# Patient Record
Sex: FEMALE | Race: WHITE | Employment: FULL TIME
[De-identification: names, ages, dates, MRNs, and addresses within clinical notes are randomized per-mention and may not be internally consistent; named-entity substitution may affect disease eponyms.]

---

## 2017-02-08 ENCOUNTER — OFFICE VISIT (OUTPATIENT)
Dept: FAMILY MEDICINE CLINIC | Facility: CLINIC | Age: 27
End: 2017-02-08

## 2017-02-08 VITALS
RESPIRATION RATE: 20 BRPM | HEIGHT: 64 IN | SYSTOLIC BLOOD PRESSURE: 110 MMHG | WEIGHT: 171 LBS | TEMPERATURE: 98.2 F | DIASTOLIC BLOOD PRESSURE: 72 MMHG | BODY MASS INDEX: 29.19 KG/M2 | HEART RATE: 88 BPM

## 2017-02-08 DIAGNOSIS — F41.1 GENERALIZED ANXIETY DISORDER: Primary | ICD-10-CM

## 2017-02-08 DIAGNOSIS — F41.0 PANIC ATTACKS: ICD-10-CM

## 2017-02-08 DIAGNOSIS — R07.9 CHEST PAIN, UNSPECIFIED TYPE: ICD-10-CM

## 2017-02-08 DIAGNOSIS — G47.9 SLEEP DISTURBANCE: ICD-10-CM

## 2017-02-08 PROCEDURE — 99214 OFFICE O/P EST MOD 30 MIN: CPT | Performed by: PEDIATRICS

## 2017-02-08 PROCEDURE — 93000 ELECTROCARDIOGRAM COMPLETE: CPT | Performed by: PEDIATRICS

## 2017-02-08 RX ORDER — HYDROXYZINE PAMOATE 25 MG/1
25 CAPSULE ORAL DAILY PRN
Qty: 30 CAPSULE | Refills: 0 | Status: SHIPPED | OUTPATIENT
Start: 2017-02-08 | End: 2020-02-06 | Stop reason: SDUPTHER

## 2017-02-08 RX ORDER — CITALOPRAM 10 MG/1
10 TABLET ORAL DAILY
Qty: 30 TABLET | Refills: 2 | Status: SHIPPED | OUTPATIENT
Start: 2017-02-08 | End: 2017-05-11 | Stop reason: SDUPTHER

## 2017-02-08 ASSESSMENT — ENCOUNTER SYMPTOMS
CHEST TIGHTNESS: 1
WHEEZING: 0
EYE DISCHARGE: 0
RHINORRHEA: 0
BLOOD IN STOOL: 0
EYE REDNESS: 0
DIARRHEA: 0
CONSTIPATION: 0
SHORTNESS OF BREATH: 1
COUGH: 0
SINUS PRESSURE: 0

## 2017-02-08 ASSESSMENT — PATIENT HEALTH QUESTIONNAIRE - PHQ9
2. FEELING DOWN, DEPRESSED OR HOPELESS: 1
SUM OF ALL RESPONSES TO PHQ9 QUESTIONS 1 & 2: 1
SUM OF ALL RESPONSES TO PHQ QUESTIONS 1-9: 1

## 2017-05-11 DIAGNOSIS — F41.0 PANIC ATTACKS: ICD-10-CM

## 2017-05-11 DIAGNOSIS — F41.1 GENERALIZED ANXIETY DISORDER: ICD-10-CM

## 2017-05-11 RX ORDER — CITALOPRAM 10 MG/1
10 TABLET ORAL DAILY
Qty: 30 TABLET | Refills: 2 | Status: SHIPPED | OUTPATIENT
Start: 2017-05-11 | End: 2017-08-10 | Stop reason: SDUPTHER

## 2017-06-21 ENCOUNTER — OFFICE VISIT (OUTPATIENT)
Dept: FAMILY MEDICINE CLINIC | Age: 27
End: 2017-06-21
Payer: COMMERCIAL

## 2017-06-21 VITALS
WEIGHT: 172 LBS | HEART RATE: 84 BPM | DIASTOLIC BLOOD PRESSURE: 84 MMHG | SYSTOLIC BLOOD PRESSURE: 120 MMHG | RESPIRATION RATE: 16 BRPM | TEMPERATURE: 98.2 F | BODY MASS INDEX: 29.52 KG/M2

## 2017-06-21 DIAGNOSIS — F41.0 PANIC ATTACKS: ICD-10-CM

## 2017-06-21 DIAGNOSIS — G47.9 SLEEP DISTURBANCE: ICD-10-CM

## 2017-06-21 DIAGNOSIS — F41.1 GENERALIZED ANXIETY DISORDER: Primary | ICD-10-CM

## 2017-06-21 DIAGNOSIS — R60.9 SWELLING: ICD-10-CM

## 2017-06-21 DIAGNOSIS — K90.41 GLUTEN-SENSITIVE ENTEROPATHY: ICD-10-CM

## 2017-06-21 PROCEDURE — 99214 OFFICE O/P EST MOD 30 MIN: CPT | Performed by: PEDIATRICS

## 2017-06-21 RX ORDER — LORATADINE 10 MG/1
10 TABLET, ORALLY DISINTEGRATING ORAL DAILY
COMMUNITY

## 2017-06-21 ASSESSMENT — ENCOUNTER SYMPTOMS
SINUS PRESSURE: 0
DIARRHEA: 0
COLOR CHANGE: 0
COUGH: 0
EYE REDNESS: 0
CONSTIPATION: 0
CHEST TIGHTNESS: 0
EYE DISCHARGE: 0
ABDOMINAL PAIN: 0
RHINORRHEA: 0
EYE PAIN: 0
STRIDOR: 0
BLOOD IN STOOL: 0
WHEEZING: 0
SHORTNESS OF BREATH: 0

## 2017-08-10 DIAGNOSIS — F41.1 GENERALIZED ANXIETY DISORDER: ICD-10-CM

## 2017-08-10 DIAGNOSIS — F41.0 PANIC ATTACKS: ICD-10-CM

## 2017-08-10 RX ORDER — CITALOPRAM 10 MG/1
10 TABLET ORAL DAILY
Qty: 30 TABLET | Refills: 3 | Status: SHIPPED | OUTPATIENT
Start: 2017-08-10 | End: 2017-12-12 | Stop reason: SDUPTHER

## 2017-12-28 ENCOUNTER — OFFICE VISIT (OUTPATIENT)
Dept: FAMILY MEDICINE CLINIC | Age: 27
End: 2017-12-28
Payer: COMMERCIAL

## 2017-12-28 VITALS
RESPIRATION RATE: 18 BRPM | DIASTOLIC BLOOD PRESSURE: 76 MMHG | SYSTOLIC BLOOD PRESSURE: 110 MMHG | WEIGHT: 189 LBS | HEART RATE: 72 BPM | TEMPERATURE: 98.6 F | BODY MASS INDEX: 32.44 KG/M2

## 2017-12-28 DIAGNOSIS — F41.1 GENERALIZED ANXIETY DISORDER: Primary | ICD-10-CM

## 2017-12-28 DIAGNOSIS — F41.0 PANIC ATTACKS: ICD-10-CM

## 2017-12-28 PROCEDURE — 99214 OFFICE O/P EST MOD 30 MIN: CPT | Performed by: PEDIATRICS

## 2017-12-28 ASSESSMENT — ENCOUNTER SYMPTOMS
WHEEZING: 0
CHEST TIGHTNESS: 0
COLOR CHANGE: 0
COUGH: 0
SINUS PRESSURE: 0
DIARRHEA: 0
CONSTIPATION: 0
STRIDOR: 0
ABDOMINAL PAIN: 0
EYE REDNESS: 0
EYE DISCHARGE: 0
SHORTNESS OF BREATH: 0
BLOOD IN STOOL: 0
EYE PAIN: 0
RHINORRHEA: 0

## 2017-12-28 NOTE — PROGRESS NOTES
episode of anxiety associated with increased stress and this did cause some mild chest pain for several days but this resolved with resolution of the stress. She was previously in therapy but she has been doing so well she has not needed to see her counselor in the last 6 months. She is sleeping well and has not had any new night terrors. She denies any side effects from her medication. She is no longer following a gluten free diet and she has not had any swelling in her hands and legs. She has no other concerns at this time. cmw    Review of Systems   Constitutional: Negative for diaphoresis and fatigue. HENT: Negative for congestion, ear pain, rhinorrhea and sinus pressure. Eyes: Negative for pain, discharge and redness. Respiratory: Negative for cough, chest tightness, shortness of breath, wheezing and stridor. Cardiovascular: Negative for chest pain, palpitations and leg swelling. Gastrointestinal: Negative for abdominal pain, blood in stool, constipation and diarrhea. Endocrine: Negative for polydipsia and polyphagia. Genitourinary: Negative for dysuria, frequency and urgency. Musculoskeletal: Negative for arthralgias. Skin: Negative for color change and rash. Allergic/Immunologic: Negative for immunocompromised state. Neurological: Negative for dizziness, light-headedness, numbness and headaches. Hematological: Does not bruise/bleed easily. Psychiatric/Behavioral: Positive for sleep disturbance (much improved). Negative for agitation, decreased concentration and dysphoric mood. The patient is nervous/anxious (improved with celexa). Objective:   Physical Exam   Constitutional: She is oriented to person, place, and time. She appears well-developed and well-nourished. No distress. HENT:   Head: Normocephalic. Right Ear: External ear normal.   Left Ear: External ear normal.   Nose: Nose normal.   Mouth/Throat: Oropharynx is clear and moist. No oropharyngeal exudate.

## 2018-03-02 ENCOUNTER — HOSPITAL ENCOUNTER (OUTPATIENT)
Age: 28
Setting detail: SPECIMEN
Discharge: HOME | End: 2018-03-02
Payer: COMMERCIAL

## 2018-03-02 ENCOUNTER — OFFICE VISIT (OUTPATIENT)
Dept: FAMILY MEDICINE CLINIC | Age: 28
End: 2018-03-02
Payer: COMMERCIAL

## 2018-03-02 VITALS
WEIGHT: 186 LBS | RESPIRATION RATE: 18 BRPM | SYSTOLIC BLOOD PRESSURE: 112 MMHG | BODY MASS INDEX: 31.93 KG/M2 | HEART RATE: 78 BPM | DIASTOLIC BLOOD PRESSURE: 70 MMHG | TEMPERATURE: 98 F

## 2018-03-02 DIAGNOSIS — J06.9 ACUTE URI: ICD-10-CM

## 2018-03-02 DIAGNOSIS — M25.551 RIGHT HIP PAIN: ICD-10-CM

## 2018-03-02 DIAGNOSIS — Z01.419 ENCOUNTER FOR CERVICAL PAP SMEAR WITH PELVIC EXAM: Primary | ICD-10-CM

## 2018-03-02 DIAGNOSIS — Z12.89 ENCOUNTER FOR PELVIC SCREENING FOR MALIGNANT NEOPLASM: ICD-10-CM

## 2018-03-02 DIAGNOSIS — F41.1 GENERALIZED ANXIETY DISORDER: ICD-10-CM

## 2018-03-02 DIAGNOSIS — F41.0 PANIC ATTACKS: ICD-10-CM

## 2018-03-02 DIAGNOSIS — Z23 NEED FOR TDAP VACCINATION: ICD-10-CM

## 2018-03-02 PROCEDURE — 90471 IMMUNIZATION ADMIN: CPT | Performed by: PEDIATRICS

## 2018-03-02 PROCEDURE — 99395 PREV VISIT EST AGE 18-39: CPT | Performed by: PEDIATRICS

## 2018-03-02 PROCEDURE — 90715 TDAP VACCINE 7 YRS/> IM: CPT | Performed by: PEDIATRICS

## 2018-03-02 ASSESSMENT — ENCOUNTER SYMPTOMS
ABDOMINAL PAIN: 0
CONSTIPATION: 0
NAUSEA: 0
VOMITING: 0
DIARRHEA: 0
BACK PAIN: 0

## 2018-03-02 ASSESSMENT — PATIENT HEALTH QUESTIONNAIRE - PHQ9
1. LITTLE INTEREST OR PLEASURE IN DOING THINGS: 0
SUM OF ALL RESPONSES TO PHQ9 QUESTIONS 1 & 2: 0
2. FEELING DOWN, DEPRESSED OR HOPELESS: 0
SUM OF ALL RESPONSES TO PHQ QUESTIONS 1-9: 0

## 2018-03-02 NOTE — PROGRESS NOTES
is no rash, tenderness or lesion on the left labia. Cervix exhibits no motion tenderness, no discharge and no friability. Right adnexum displays no mass, no tenderness and no fullness. Left adnexum displays no mass, no tenderness and no fullness. Genitourinary Comments: There is a slight white areas along the vagina that could be consistent with yeast vaginitis. Will await pap results (as this will report candida) as patient is asymptomatic. If there is candida will treat with diflucan. Musculoskeletal: Normal range of motion. She exhibits no edema. Right hip: Normal. She exhibits normal range of motion, normal strength, no tenderness, no bony tenderness, no swelling, no crepitus and no deformity. No right hip abnormalities noted   Lymphadenopathy:     She has no cervical adenopathy. Neurological: She is alert. She has normal reflexes. No cranial nerve deficit. Coordination normal.   Skin: Skin is warm and dry. No rash noted. She is not diaphoretic. No erythema. Psychiatric: She has a normal mood and affect. Her behavior is normal.       Assessment:      1. Encounter for cervical Pap smear with pelvic exam  PAP Smear   2. Encounter for pelvic screening for malignant neoplasm  PAP Smear   3. Acute URI     4. Right hip pain  XR HIP RIGHT (2-3 VIEWS)   5. Generalized anxiety disorder     6. Panic attacks     7.  Need for Tdap vaccination  Tdap (age 10y-63y) IM ([facility]ACEL)           Plan:      Proceed with obtain pap for evaluation  Recommend monthly self breast exams  Advise healthy diet / daily exercise   Recommend good sleep hygiene   Reassured that URI symptoms do seem to be improving and should continue to improve over time  Obtain right hip xray / recommend chiropractor treatments, massage therapy and yoga  Continue current medications  Proceed with Tdap today   Continue multi-vitamin / try airborne and kombucha to boost immune system  Call with concerns     Jaky mack counseling on the

## 2018-03-03 ASSESSMENT — ENCOUNTER SYMPTOMS
STRIDOR: 0
EYE DISCHARGE: 0
WHEEZING: 0
SHORTNESS OF BREATH: 0
EYE PAIN: 0
COLOR CHANGE: 0
EYE REDNESS: 0
COUGH: 0
VOICE CHANGE: 1
PHOTOPHOBIA: 0
SORE THROAT: 1

## 2018-03-13 LAB — CYTOLOGY REPORT: NORMAL

## 2018-03-16 ENCOUNTER — HOSPITAL ENCOUNTER (OUTPATIENT)
Facility: CLINIC | Age: 28
Discharge: HOME | End: 2018-03-18
Payer: COMMERCIAL

## 2018-03-16 ENCOUNTER — HOSPITAL ENCOUNTER (OUTPATIENT)
Dept: GENERAL RADIOLOGY | Facility: CLINIC | Age: 28
Discharge: HOME | End: 2018-03-18
Payer: COMMERCIAL

## 2018-03-16 DIAGNOSIS — M25.551 RIGHT HIP PAIN: ICD-10-CM

## 2018-03-16 PROCEDURE — 73502 X-RAY EXAM HIP UNI 2-3 VIEWS: CPT

## 2018-06-18 DIAGNOSIS — F41.0 PANIC ATTACKS: ICD-10-CM

## 2018-06-18 DIAGNOSIS — F41.1 GENERALIZED ANXIETY DISORDER: ICD-10-CM

## 2018-06-18 RX ORDER — CITALOPRAM 10 MG/1
10 TABLET ORAL DAILY
Qty: 30 TABLET | Refills: 5 | Status: SHIPPED | OUTPATIENT
Start: 2018-06-18 | End: 2018-12-26 | Stop reason: SDUPTHER

## 2018-06-25 DIAGNOSIS — Z30.09 BIRTH CONTROL COUNSELING: ICD-10-CM

## 2018-06-25 DIAGNOSIS — Z01.419 WELL FEMALE EXAM WITH ROUTINE GYNECOLOGICAL EXAM: ICD-10-CM

## 2018-06-25 RX ORDER — NORGESTIMATE AND ETHINYL ESTRADIOL 7DAYSX3 28
1 KIT ORAL DAILY
Qty: 28 TABLET | Refills: 10 | Status: SHIPPED | OUTPATIENT
Start: 2018-06-25 | End: 2019-02-27 | Stop reason: SDUPTHER

## 2019-02-27 DIAGNOSIS — Z01.419 WELL FEMALE EXAM WITH ROUTINE GYNECOLOGICAL EXAM: ICD-10-CM

## 2019-02-27 DIAGNOSIS — F41.0 PANIC ATTACKS: ICD-10-CM

## 2019-02-27 DIAGNOSIS — F41.1 GENERALIZED ANXIETY DISORDER: ICD-10-CM

## 2019-02-27 DIAGNOSIS — Z30.09 BIRTH CONTROL COUNSELING: ICD-10-CM

## 2019-02-27 RX ORDER — CITALOPRAM 10 MG/1
10 TABLET ORAL DAILY
Qty: 90 TABLET | Refills: 1 | Status: SHIPPED | OUTPATIENT
Start: 2019-02-27 | End: 2019-08-05 | Stop reason: SDUPTHER

## 2019-02-27 RX ORDER — NORGESTIMATE AND ETHINYL ESTRADIOL 7DAYSX3 28
1 KIT ORAL DAILY
Qty: 84 TABLET | Refills: 3 | Status: SHIPPED | OUTPATIENT
Start: 2019-02-27 | End: 2019-12-18

## 2019-02-27 NOTE — TELEPHONE ENCOUNTER
Patient contacted office stating she is switching to mail order for her prescriptions and [facility]

## 2019-08-05 DIAGNOSIS — F41.0 PANIC ATTACKS: ICD-10-CM

## 2019-08-05 DIAGNOSIS — F41.1 GENERALIZED ANXIETY DISORDER: ICD-10-CM

## 2019-08-05 RX ORDER — CITALOPRAM 10 MG/1
10 TABLET ORAL DAILY
Qty: 90 TABLET | Refills: 1 | Status: SHIPPED | OUTPATIENT
Start: 2019-08-05 | End: 2020-08-16

## 2019-12-18 DIAGNOSIS — Z01.419 WELL FEMALE EXAM WITH ROUTINE GYNECOLOGICAL EXAM: ICD-10-CM

## 2019-12-18 DIAGNOSIS — Z30.09 BIRTH CONTROL COUNSELING: ICD-10-CM

## 2019-12-18 RX ORDER — NORGESTIMATE AND ETHINYL ESTRADIOL 7DAYSX3 28
KIT ORAL
Qty: 84 TABLET | Refills: 4 | Status: SHIPPED | OUTPATIENT
Start: 2019-12-18 | End: 2021-02-10

## 2020-01-21 ENCOUNTER — TELEPHONE (OUTPATIENT)
Dept: FAMILY MEDICINE CLINIC | Age: 30
End: 2020-01-21

## 2020-01-21 NOTE — TELEPHONE ENCOUNTER
I am routing this message to Dr. Aly Toscano as she is patient's PCP and typically sees patient in office.

## 2020-01-21 NOTE — TELEPHONE ENCOUNTER
I do not feel comfortable changing her medication as she has not been seen in almost 2 years. I did give this note to Emil Carlson who will try and find a sooner appointment.

## 2020-01-23 NOTE — TELEPHONE ENCOUNTER
Patient did schedule the appt to ask about the medication but did not intend to have it adjusted before the appt.

## 2020-02-06 ENCOUNTER — OFFICE VISIT (OUTPATIENT)
Dept: FAMILY MEDICINE CLINIC | Age: 30
End: 2020-02-06
Payer: COMMERCIAL

## 2020-02-06 VITALS
SYSTOLIC BLOOD PRESSURE: 110 MMHG | HEART RATE: 70 BPM | BODY MASS INDEX: 29.7 KG/M2 | WEIGHT: 173 LBS | TEMPERATURE: 98 F | DIASTOLIC BLOOD PRESSURE: 70 MMHG

## 2020-02-06 PROBLEM — F41.0 PANIC ATTACKS: Status: ACTIVE | Noted: 2020-02-06

## 2020-02-06 PROBLEM — F32.0 CURRENT MILD EPISODE OF MAJOR DEPRESSIVE DISORDER WITHOUT PRIOR EPISODE (HCC): Status: ACTIVE | Noted: 2020-02-06

## 2020-02-06 PROBLEM — F41.1 GENERALIZED ANXIETY DISORDER: Status: ACTIVE | Noted: 2020-02-06

## 2020-02-06 PROBLEM — F51.4: Status: ACTIVE | Noted: 2020-02-06

## 2020-02-06 PROCEDURE — 99214 OFFICE O/P EST MOD 30 MIN: CPT | Performed by: PEDIATRICS

## 2020-02-06 RX ORDER — CITALOPRAM 20 MG/1
20 TABLET ORAL DAILY
Qty: 90 TABLET | Refills: 1 | Status: SHIPPED | OUTPATIENT
Start: 2020-02-06 | End: 2020-07-17

## 2020-02-06 RX ORDER — HYDROXYZINE PAMOATE 25 MG/1
25 CAPSULE ORAL DAILY PRN
Qty: 30 CAPSULE | Refills: 0 | Status: SHIPPED | OUTPATIENT
Start: 2020-02-06 | End: 2020-03-07

## 2020-02-06 ASSESSMENT — ENCOUNTER SYMPTOMS
WHEEZING: 0
SINUS PRESSURE: 0
ABDOMINAL PAIN: 0
RHINORRHEA: 0
EYE DISCHARGE: 0
EYE PAIN: 0
DIARRHEA: 0
BLOOD IN STOOL: 0
SHORTNESS OF BREATH: 0
COLOR CHANGE: 0
CHEST TIGHTNESS: 0
STRIDOR: 0
EYE REDNESS: 0
CONSTIPATION: 0
COUGH: 0

## 2020-02-06 ASSESSMENT — PATIENT HEALTH QUESTIONNAIRE - PHQ9
2. FEELING DOWN, DEPRESSED OR HOPELESS: 0
SUM OF ALL RESPONSES TO PHQ QUESTIONS 1-9: 0
SUM OF ALL RESPONSES TO PHQ QUESTIONS 1-9: 0
SUM OF ALL RESPONSES TO PHQ9 QUESTIONS 1 & 2: 0
1. LITTLE INTEREST OR PLEASURE IN DOING THINGS: 0

## 2020-02-06 NOTE — PROGRESS NOTES
Subjective:      Patient ID: Devora Muller is a 30 y.o. female. Visit Information    Have you changed or started any medications since your last visit including any over-the-counter medicines, vitamins, or herbal medicines? no   Are you having any side effects from any of your medications? -  no  Have you stopped taking any of your medications? Is so, why? -  no    Have you seen any other physician or provider since your last visit? Yes - Records Obtained  Have you had any other diagnostic tests since your last visit? No  Have you been seen in the emergency room and/or had an admission to a hospital since we last saw you? No  Have you had your routine dental cleaning in the past 6 months? yes -     Have you activated your [employer] account? If not, what are your barriers?  Yes     Patient Care Team:  Rica San MD as PCP - General (Pediatrics)  Rica San MD as PCP - [facility] Provider    Medical History Review  Past Medical, Family, and Social History reviewed and does contribute to the patient presenting condition    Health Maintenance   Topic Date Due    Varicella vaccine (1 of 2 - 2-dose childhood series) 11/23/1989    HIV screen  11/23/2003    Flu vaccine (1) 09/01/2019    Cervical cancer screen  03/02/2021    DTaP/Tdap/Td vaccine (2 - Td) 03/02/2028    Shingles Vaccine (1 of 2) 11/23/2038    Hepatitis A vaccine  Aged Out    Hepatitis B vaccine  Aged Out    Hib vaccine  Aged Out    Meningococcal (ACWY) vaccine  Aged Out    Pneumococcal 0-64 years Vaccine  Aged Out       [facility] Scores 2/6/2020 3/2/2018 2/8/2017   PHQ2 Score 0 0 1   PHQ9 Score 0 0 1     Interpretation of Total Score DepressionSeverity: 1-4 = Minimal depression, 5-9 = Mild depression, 10-14 = Moderate depression, 15-19 = Moderately severe depression, 20-27 = Severe depression    Current Outpatient Medications   Medication Sig Dispense Refill    hydrOXYzine (VISTARIL) 25 MG capsule Take 1 capsule by mouth daily as needed for Anxiety 30 capsule 0    citalopram (CELEXA) 20 MG tablet Take 1 tablet by mouth daily 90 tablet 1    Norgestim-Eth Estrad Triphasic 0.18/0.215/0.25 MG-35 MCG TABS TAKE 1 TABLET DAILY 84 tablet 4    citalopram (CELEXA) 10 MG tablet Take 1 tablet by mouth daily NEEDS APPT 90 tablet 1    loratadine (CLARITIN REDITABS) 10 MG dissolvable tablet Take 10 mg by mouth daily       No current facility-administered medications for this visit. HPI    Patient presents today for worsening symptoms of anxiety and sleep disturbance secondary to night terrors. She does have a history of anxiety and panic attacks with frequent night terrors as well as situational depression in the past.  Approximately 3 years ago she was placed on Celexa 10 mg and has been taking this since then. She states her symptoms have been fairly well-controlled until several months ago. She states that her night terrors have gotten worse over the last several months and she is not sure what has triggered this. She did have a difficult time over the holidays because everyone bugs her about having children and she is happy without children. She also reports that over the last few months she has had difficulty focusing. Even at work she gets somewhat overwhelmed and will need to stop and refocus at times. In January she started having difficulties with being alone however when she was with people she would often feel like she wanted to leave and go back home to be by herself. She does feel as though the social anxiety has worsened over the last several months. She has seen a counselor, Leanna Saenz in the past.  She did see her for approximately a year in 2017. She does feel as though maybe the weather has something to do with her symptoms as well. She is interested in increasing her medication to see if this will be helpful. She will consider counseling.   She does have a prescription for hydroxyzine that she was membranes are moist.      Pharynx: No oropharyngeal exudate or posterior oropharyngeal erythema. Eyes:      General:         Right eye: No discharge. Left eye: No discharge. Extraocular Movements: Extraocular movements intact. Conjunctiva/sclera: Conjunctivae normal.      Pupils: Pupils are equal, round, and reactive to light. Neck:      Musculoskeletal: Normal range of motion and neck supple. Thyroid: No thyromegaly. Vascular: No JVD. Cardiovascular:      Rate and Rhythm: Normal rate and regular rhythm. Heart sounds: Normal heart sounds. No murmur. Pulmonary:      Effort: Pulmonary effort is normal.      Breath sounds: Normal breath sounds. No wheezing or rales. Abdominal:      Tenderness: There is no right CVA tenderness or left CVA tenderness. Musculoskeletal:      Right lower leg: No edema. Left lower leg: No edema. Skin:     General: Skin is warm. Findings: No rash. Neurological:      General: No focal deficit present. Mental Status: She is alert and oriented to person, place, and time. Psychiatric:         Attention and Perception: Attention normal.         Mood and Affect: Mood is anxious and depressed. Speech: Speech normal.         Behavior: Behavior normal.         Thought Content: Thought content normal.         Judgment: Judgment normal.         Assessment:      Diagnosis Orders   1. Generalized anxiety disorder     2. Panic attacks  hydrOXYzine (VISTARIL) 25 MG capsule   3. Current mild episode of major depressive disorder without prior episode ([address]     4. Sleep disturbance     5.  Adult night terror         Plan:     Proceed with increase Celexa to 20 mg once daily  Refill hydroxyzine to use as needed for severe anxiety  Recommend counseling  Regular exercise and healthy diet encouraged  Good sleep hygiene recommended  Call with concerns or worsening symptoms      Noa received counseling on the following healthy behaviors:

## 2020-07-17 RX ORDER — CITALOPRAM 20 MG/1
20 TABLET ORAL DAILY
Qty: 90 TABLET | Refills: 3 | Status: SHIPPED | OUTPATIENT
Start: 2020-07-17 | End: 2021-07-13

## 2020-07-17 NOTE — TELEPHONE ENCOUNTER
Lov: 2/6/20  Lrf: 2/6/20  Na: 8/6/20  Health Maintenance   Topic Date Due    Varicella vaccine (1 of 2 - 2-dose childhood series) 11/23/1989    HIV screen  11/23/2003    Flu vaccine (1) 09/01/2020    Cervical cancer screen  03/02/2021    DTaP/Tdap/Td vaccine (2 - Td) 03/02/2028    Hepatitis A vaccine  Aged Out    Hepatitis B vaccine  Aged Out    Hib vaccine  Aged Out    Meningococcal (ACWY) vaccine  Aged Out    Pneumococcal 0-64 years Vaccine  Aged Out             (applicable per patient's age: Cancer Screenings, Depression Screening, Fall Risk Screening, Immunizations)    LDL Cholesterol (mg/dL)   Date Value   04/14/2015 46     AST (U/L)   Date Value   04/14/2015 20     ALT (U/L)   Date Value   04/14/2015 14     BUN (mg/dL)   Date Value   04/14/2015 16      (goal A1C is < 7)   (goal LDL is <100) need 30-50% reduction from baseline     BP Readings from Last 3 Encounters:   02/06/20 110/70   03/02/18 112/70   12/28/17 110/76    (goal /80)      All Future Testing planned in [facility]:      Next Visit Date:  Future Appointments   Date Time Provider Trey Pratt   8/6/2020  1:40 PM MD [facility]            Patient Active Problem List:     Generalized anxiety disorder     Adult night terror     Panic attacks     Current mild episode of major depressive disorder without prior episode ([address]

## 2020-08-06 ENCOUNTER — HOSPITAL ENCOUNTER (OUTPATIENT)
Age: 30
Setting detail: SPECIMEN
Discharge: HOME | End: 2020-08-06
Payer: COMMERCIAL

## 2020-08-06 ENCOUNTER — OFFICE VISIT (OUTPATIENT)
Dept: FAMILY MEDICINE CLINIC | Age: 30
End: 2020-08-06
Payer: COMMERCIAL

## 2020-08-06 VITALS
HEART RATE: 77 BPM | BODY MASS INDEX: 32.74 KG/M2 | HEIGHT: 64 IN | OXYGEN SATURATION: 97 % | WEIGHT: 191.8 LBS | TEMPERATURE: 97 F | SYSTOLIC BLOOD PRESSURE: 112 MMHG | RESPIRATION RATE: 16 BRPM | DIASTOLIC BLOOD PRESSURE: 72 MMHG

## 2020-08-06 PROCEDURE — 99395 PREV VISIT EST AGE 18-39: CPT | Performed by: PEDIATRICS

## 2020-08-06 RX ORDER — AMOXICILLIN AND CLAVULANATE POTASSIUM 875; 125 MG/1; MG/1
1 TABLET, FILM COATED ORAL 2 TIMES DAILY
COMMUNITY
Start: 2020-08-04 | End: 2020-12-29 | Stop reason: ALTCHOICE

## 2020-08-06 RX ORDER — CLINDAMYCIN PHOSPHATE 10 UG/ML
LOTION TOPICAL PRN
COMMUNITY
Start: 2020-07-28 | End: 2020-12-29 | Stop reason: ALTCHOICE

## 2020-08-06 ASSESSMENT — ENCOUNTER SYMPTOMS
ABDOMINAL PAIN: 0
EYE REDNESS: 0
NAUSEA: 0
SHORTNESS OF BREATH: 0
COUGH: 0
WHEEZING: 0
VOMITING: 0
EYE DISCHARGE: 0
COLOR CHANGE: 0
CONSTIPATION: 0
DIARRHEA: 0
STRIDOR: 0
PHOTOPHOBIA: 0
EYE PAIN: 0

## 2020-08-06 NOTE — PROGRESS NOTES
Subjective:      Patient ID: Allison Zeng is a 30 y.o. female. Visit Information    Have you changed or started any medications since your last visit including any over-the-counter medicines, vitamins, or herbal medicines? yes - Updated   Are you having any side effects from any of your medications? -  no  Have you stopped taking any of your medications? Is so, why? -  no    Have you seen any other physician or provider since your last visit? Yes - Records Requested Arlis Mention- Chiro 2x monthly- Dehydrated disc  Have you had any other diagnostic tests since your last visit? Yes - Records Requested XR Chiropractor   Have you been seen in the emergency room and/or had an admission to a hospital since we last saw you? No  Have you had your routine dental cleaning in the past 6 months? yes -      Have you activated your [employer] account? If not, what are your barriers?  Yes     Patient Care Team:  Mau Polanco MD as PCP - General (Pediatrics)  Mau Polanco MD as PCP - [facility] Provider    Medical History Review  Past Medical, Family, and Social History reviewed and does contribute to the patient presenting condition    Health Maintenance   Topic Date Due    Varicella vaccine (1 of 2 - 2-dose childhood series) 11/23/1989    HIV screen  11/23/2003    Flu vaccine (1) 09/01/2020    Cervical cancer screen  08/06/2025    DTaP/Tdap/Td vaccine (2 - Td) 03/02/2028    Hepatitis A vaccine  Aged Out    Hepatitis B vaccine  Aged Out    Hib vaccine  Aged Out    Meningococcal (ACWY) vaccine  Aged Out    Pneumococcal 0-64 years Vaccine  Aged Out       [facility] Scores 2/6/2020 3/2/2018 2/8/2017   PHQ2 Score 0 0 1   PHQ9 Score 0 0 1     Interpretation of Total Score DepressionSeverity: 1-4 = Minimal depression, 5-9 = Mild depression, 10-14 = Moderate depression, 15-19 = Moderately severe depression, 20-27 = Severe depression    Current Outpatient Medications   Medication Sig Dispense Refill  amoxicillin-clavulanate (AUGMENTIN) 875-125 MG per tablet Take 1 tablet by mouth 2 times daily For 10 days.  clindamycin (CLEOCIN T) 1 % lotion as needed      citalopram (CELEXA) 20 MG tablet Take 1 tablet by mouth daily 90 tablet 3    Norgestim-Eth Estrad Triphasic 0.18/0.215/0.25 MG-35 MCG TABS TAKE 1 TABLET DAILY 84 tablet 4    loratadine (CLARITIN REDITABS) 10 MG dissolvable tablet Take 10 mg by mouth daily      citalopram (CELEXA) 10 MG tablet Take 1 tablet by mouth daily NEEDS APPT (Patient not taking: Reported on 8/6/2020) 90 tablet 1     No current facility-administered medications for this visit. HPI    Patient presents today for routine Pap and pelvic/physical exam.  She does report that her menstrual cycles are regular with some occasional spotting. She denies any vaginal discharge, vaginal pain or pelvic pain. She is sexually active with one partner and denies any other gynecologic concerns. She does perform occasional self breast exam without concerns. She does have a history of seasonal allergies which are well controlled with an as needed antihistamine. She does also have a history of generalized anxiety disorder, panic disorder, major depression and sleep disturbance that is well controlled with Celexa. She does have a prescription for hydroxyzine that she has used in the past for severe episodes of panic. She states that she has developed some acne across the forehead and cheeks. She did see a dermatologist and was prescribed clindamycin in the morning and retinol gel in the evening. She did also have a root canal and crown 2 days ago with [facility]. Her biggest complaint is that she has been having a lot of low back pain on the right side that feels like a pinching sensation. She does have some numbness and tingling down both legs. She denies any bowel or bladder symptoms.   She did have x-rays 2 weeks ago with Dr. Misael Marsh her chiropractor at [facility]. She states that she has been seeing him for approximately 5 years intermittently. He has been working on her low back since March and did see a dehydrated disc on her x-ray. She has been using an inversion table and doing yoga 3 times weekly. She does report that she would like to see a spine surgeon, Dr. Anna Pantoja. She did have a fall last year in the fall / early winter and wonders if she did something then. She has no other concerns at this time. cmw    Review of Systems   Constitutional: Negative for appetite change, chills, fatigue and fever. HENT: Negative for congestion, ear discharge, ear pain, hearing loss, postnasal drip, rhinorrhea, sinus pressure, sinus pain, sore throat and voice change. Allergies controlled with as needed antihistamine   Eyes: Negative for photophobia, pain, discharge, redness and visual disturbance. Respiratory: Negative for cough, shortness of breath, wheezing and stridor. Cardiovascular: Negative for chest pain, palpitations and leg swelling. Gastrointestinal: Negative for abdominal pain, blood in stool, constipation, diarrhea, nausea and vomiting. Endocrine: Negative for polydipsia, polyphagia and polyuria. Genitourinary: Negative for decreased urine volume, dysuria, flank pain, frequency, hematuria, pelvic pain, urgency, vaginal bleeding, vaginal discharge and vaginal pain. Musculoskeletal: Positive for back pain. Negative for arthralgias, gait problem and joint swelling. Skin: Negative for color change and rash. acne   Allergic/Immunologic: Negative for immunocompromised state. Neurological: Positive for numbness. Negative for dizziness, weakness, light-headedness and headaches. Hematological: Negative for adenopathy. Does not bruise/bleed easily. Psychiatric/Behavioral: Positive for dysphoric mood (controlled). Negative for decreased concentration and sleep disturbance.  The patient is nervous/anxious (controlled with celexa). Objective:     /72 (Site: Left Upper Arm, Position: Sitting, Cuff Size: Large Adult)   Pulse 77   Temp 97 °F (36.1 °C) (Temporal)   Resp 16   Ht 5' 4\" (1.626 m)   Wt 191 lb 12.8 oz (87 kg)   LMP 07/26/2020 (Exact Date)   SpO2 97%   Breastfeeding No   BMI 32.92 kg/m²        Physical Exam  Vitals signs and nursing note reviewed. Constitutional:       General: She is not in acute distress. Appearance: Normal appearance. She is well-developed. She is obese. She is not ill-appearing, toxic-appearing or diaphoretic. HENT:      Head: Normocephalic and atraumatic. Right Ear: Tympanic membrane, ear canal and external ear normal. No middle ear effusion. There is no impacted cerumen. Left Ear: Tympanic membrane, ear canal and external ear normal.  No middle ear effusion. There is no impacted cerumen. Nose: Mucosal edema present. No congestion or rhinorrhea. Right Sinus: No maxillary sinus tenderness or frontal sinus tenderness. Left Sinus: No maxillary sinus tenderness or frontal sinus tenderness. Mouth/Throat:      Mouth: Mucous membranes are moist.      Pharynx: Uvula midline. No oropharyngeal exudate or posterior oropharyngeal erythema. Eyes:      General: No scleral icterus. Right eye: No discharge. Left eye: No discharge. Conjunctiva/sclera: Conjunctivae normal.      Pupils: Pupils are equal, round, and reactive to light. Neck:      Musculoskeletal: Normal range of motion and neck supple. No neck rigidity. Thyroid: No thyromegaly. Vascular: No carotid bruit or JVD. Cardiovascular:      Rate and Rhythm: Normal rate and regular rhythm. Pulses: Normal pulses. Heart sounds: Normal heart sounds. No murmur. Pulmonary:      Effort: Pulmonary effort is normal. No respiratory distress. Breath sounds: Normal breath sounds. No wheezing or rales.    Abdominal:      General: Bowel sounds are normal.      Palpations: Abdomen is soft. There is no mass. Tenderness: There is no abdominal tenderness. There is no right CVA tenderness, left CVA tenderness or guarding. Genitourinary:     General: Normal vulva. Exam position: Supine. Labia:         Right: No rash, tenderness or lesion. Left: No rash, tenderness or lesion. Vagina: Normal.      Cervix: No cervical motion tenderness, discharge or friability. Adnexa:         Right: No mass, tenderness or fullness. Left: No mass, tenderness or fullness. Musculoskeletal: Normal range of motion. Right lower leg: No edema. Left lower leg: No edema. Lymphadenopathy:      Cervical: No cervical adenopathy. Skin:     General: Skin is warm and dry. Capillary Refill: Capillary refill takes less than 2 seconds. Findings: No erythema or rash. Neurological:      General: No focal deficit present. Mental Status: She is alert and oriented to person, place, and time. Cranial Nerves: No cranial nerve deficit. Motor: No weakness. Coordination: Coordination normal.      Gait: Gait normal.      Deep Tendon Reflexes: Reflexes are normal and symmetric. Psychiatric:         Mood and Affect: Mood normal.         Behavior: Behavior normal.         Thought Content: Thought content normal.         Judgment: Judgment normal.         Assessment:      Diagnosis Orders   1. Well female exam with routine gynecological exam  PAP Smear   2. Encounter for screening for cervical cancer      3. Encounter for screening breast examination and discussion of breast self examination     4. Annual physical exam  Lipid Panel    Comprehensive Metabolic Panel    CBC   5. Seasonal allergies     6. Generalized anxiety disorder     7. Panic attacks     8. Current mild episode of major depressive disorder without prior episode ([address]     9. Sleep disturbance     10. Acne, unspecified acne type     11.  Chronic right-sided low back pain with bilateral sciatica         Plan:     Proceed with obtain Pap  Recommend monthly self breast exams  Obtain labs as ordered  Healthy diet and regular exercise encouraged  Continue current medications  Continue antihistamine as needed for allergy symptoms  Continue Celexa  Counseling if symptoms worsen  Obtain dermatology note  Recommend follow-up with chiropractor and neurosurgeon as planned  Call with concerns      Balaji Parker received counseling on the following healthy behaviors: nutrition, exercise and medication adherence  Reviewed prior labs and health maintenance  Continue current medications, diet and exercise. Discussed use, benefit, and side effects of prescribed medications. Barriers to medication compliance addressed. Patient given educational materials - see patient instructions  Was a self-tracking handout given in paper form or via [employer]? No    Requested Prescriptions      No prescriptions requested or ordered in this encounter       All patient questions answered. Patient voiced understanding. Quality Measures    Body mass index is 32.92 kg/m². Elevated. Weight control planned discussed Healthy diet and regular exercise. BP: 112/72 Blood pressure is normal. Treatment plan consists of No treatment change needed.     Lab Results   Component Value Date    LDLCHOLESTEROL 46 04/14/2015    (goal LDL reduction with dx if diabetes is 50% LDL reduction)      PHQ Scores 2/6/2020 3/2/2018 2/8/2017   PHQ2 Score 0 0 1   PHQ9 Score 0 0 1     Interpretation of Total Score Depression Severity: 1-4 = Minimal depression, 5-9 = Mild depression, 10-14 = Moderate depression, 15-19 = Moderately severe depression, 20-27 = Severe depression          Electronically signed by Magdalene Tolentino MD on 8/16/2020 at 2:30 PM

## 2020-08-10 LAB
HPV SAMPLE: NORMAL
HPV, GENOTYPE 16: NOT DETECTED
HPV, GENOTYPE 18: NOT DETECTED
HPV, HIGH RISK OTHER: NOT DETECTED
HPV, INTERPRETATION: NORMAL
SPECIMEN DESCRIPTION: NORMAL

## 2020-08-11 LAB — CYTOLOGY REPORT: NORMAL

## 2020-08-16 ASSESSMENT — ENCOUNTER SYMPTOMS
RHINORRHEA: 0
SINUS PRESSURE: 0
BACK PAIN: 1
ROS SKIN COMMENTS: ACNE
VOICE CHANGE: 0
SINUS PAIN: 0
SORE THROAT: 0
BLOOD IN STOOL: 0

## 2020-08-25 ENCOUNTER — TELEPHONE (OUTPATIENT)
Dept: FAMILY MEDICINE CLINIC | Age: 30
End: 2020-08-25

## 2020-08-25 NOTE — TELEPHONE ENCOUNTER
Spoke with Amy Dacosta and she is requesting a referral from Dr. Christian Kwan, the the referral is to Neuorlogist Dr. Kamilla Boudreaux

## 2020-08-26 NOTE — TELEPHONE ENCOUNTER
Patient will complete x-rays at a [facility] facility, she was given the number to scheduling.

## 2020-08-27 ENCOUNTER — HOSPITAL ENCOUNTER (OUTPATIENT)
Dept: GENERAL RADIOLOGY | Age: 30
Discharge: HOME | End: 2020-08-29
Payer: COMMERCIAL

## 2020-08-27 ENCOUNTER — HOSPITAL ENCOUNTER (OUTPATIENT)
Age: 30
Discharge: HOME | End: 2020-08-27
Payer: COMMERCIAL

## 2020-08-27 ENCOUNTER — HOSPITAL ENCOUNTER (OUTPATIENT)
Age: 30
Discharge: HOME | End: 2020-08-29
Payer: COMMERCIAL

## 2020-08-27 LAB
ALBUMIN SERPL-MCNC: 3.9 G/DL (ref 3.5–5.2)
ALBUMIN/GLOBULIN RATIO: 1.3 (ref 1–2.5)
ALP BLD-CCNC: 47 U/L (ref 35–104)
ALT SERPL-CCNC: 19 U/L (ref 5–33)
ANION GAP SERPL CALCULATED.3IONS-SCNC: 11 MMOL/L (ref 9–17)
AST SERPL-CCNC: 23 U/L
BILIRUB SERPL-MCNC: 0.42 MG/DL (ref 0.3–1.2)
BUN BLDV-MCNC: 13 MG/DL (ref 6–20)
BUN/CREAT BLD: NORMAL (ref 9–20)
CALCIUM SERPL-MCNC: 8.9 MG/DL (ref 8.6–10.4)
CHLORIDE BLD-SCNC: 102 MMOL/L (ref 98–107)
CHOLESTEROL/HDL RATIO: 2
CHOLESTEROL: 161 MG/DL
CO2: 27 MMOL/L (ref 20–31)
CREAT SERPL-MCNC: 0.75 MG/DL (ref 0.5–0.9)
GFR AFRICAN AMERICAN: >60 ML/MIN
GFR NON-AFRICAN AMERICAN: >60 ML/MIN
GFR SERPL CREATININE-BSD FRML MDRD: NORMAL ML/MIN/{1.73_M2}
GFR SERPL CREATININE-BSD FRML MDRD: NORMAL ML/MIN/{1.73_M2}
GLUCOSE BLD-MCNC: 85 MG/DL (ref 70–99)
HCT VFR BLD CALC: 43.2 % (ref 36.3–47.1)
HDLC SERPL-MCNC: 79 MG/DL
HEMOGLOBIN: 13.4 G/DL (ref 11.9–15.1)
LDL CHOLESTEROL: 66 MG/DL (ref 0–130)
MCH RBC QN AUTO: 30.5 PG (ref 25.2–33.5)
MCHC RBC AUTO-ENTMCNC: 31 G/DL (ref 28.4–34.8)
MCV RBC AUTO: 98.2 FL (ref 82.6–102.9)
NRBC AUTOMATED: 0 PER 100 WBC
PDW BLD-RTO: 12.1 % (ref 11.8–14.4)
PLATELET # BLD: 323 K/UL (ref 138–453)
PMV BLD AUTO: 10.6 FL (ref 8.1–13.5)
POTASSIUM SERPL-SCNC: 4.6 MMOL/L (ref 3.7–5.3)
RBC # BLD: 4.4 M/UL (ref 3.95–5.11)
SODIUM BLD-SCNC: 140 MMOL/L (ref 135–144)
TOTAL PROTEIN: 6.9 G/DL (ref 6.4–8.3)
TRIGL SERPL-MCNC: 78 MG/DL
VLDLC SERPL CALC-MCNC: NORMAL MG/DL (ref 1–30)
WBC # BLD: 5.7 K/UL (ref 3.5–11.3)

## 2020-08-27 PROCEDURE — 80053 COMPREHEN METABOLIC PANEL: CPT

## 2020-08-27 PROCEDURE — 36415 COLL VENOUS BLD VENIPUNCTURE: CPT

## 2020-08-27 PROCEDURE — 85027 COMPLETE CBC AUTOMATED: CPT

## 2020-08-27 PROCEDURE — 80061 LIPID PANEL: CPT

## 2020-08-27 PROCEDURE — 72110 X-RAY EXAM L-2 SPINE 4/>VWS: CPT

## 2020-09-17 ENCOUNTER — HOSPITAL ENCOUNTER (OUTPATIENT)
Dept: PHYSICAL THERAPY | Facility: CLINIC | Age: 30
Setting detail: THERAPIES SERIES
Discharge: HOME | End: 2020-09-17
Payer: COMMERCIAL

## 2020-09-17 PROCEDURE — 97110 THERAPEUTIC EXERCISES: CPT

## 2020-09-17 PROCEDURE — 97161 PT EVAL LOW COMPLEX 20 MIN: CPT

## 2020-09-17 NOTE — CONSULTS
[] [address]  [address]  [phone]  F: [phone] [] [address]  [address] ([facility])   [address]  P: [phone]  F: [phone] [x] [place]  [address] [facility]  P: [phone]  F: [phone] [] [address]  [facility]   [address]   [place]: [phone]  F: [phone]      Physical Therapy Spine Evaluation    Date:  2020  Patient: Ange Horn  : 1990  MRN: [id number]  Physician: Ariel Craig MD  Insurance: [employer] 19LO  Medical Diagnosis:  M54.5 (ICD-10-CM) - Low back pain, non-specific   Rehab Codes: M54.5  Onset Date: 2020  Next 's appt. : tbd     Subjective:   CC:Patient reports continuous LBP described as aching with intermittent sharp pain down R LE to mid posterior thigh. Patient states that she feels a R side catching with bending. Pain increased with bending , lifting R LE too high, PF/DF needed with driving, prolong sitting and driving greater than an hr, prolong walking. No B&B changes, No N&T  Pain at best rated as 1-2/10. Pain at worst 10/10    HPI: (onset date) ILEANA unknown Patinet sees chiropractor 2x/mo. Patient reports receiving acupuncture 1-2x/wk      Symptoms:  [x] Improving reports much improved in last 2 wks [x] Worsening since initial occurrence in [place]  Job duties: sitting job    PMHx: [x] Unremarkable [] Diabetes [] HTN  [] Pacemaker   [] MI/Heart Problems [] Cancer [] Arthritis [x] Other:panic attacks, anxiety and depression              [x] Refer to full medical chart  In EPIC     Tests: [] X-Ray: 20  Lateral view neutral position demonstrates no significant malalignment. Alignment does not significantly change with flexion or extension.  Disc    spaces demonstrate minimal degenerative changes. Vasopneumatic cold with compression  [id number]    [] Gait Training   [id number] [] Ultrasound   [id number]   [x] Neuromuscular Re-education  [id number] [] Electrical Stimulation Unattended  33853   [x] Manual Therapy  24701 [] Electrical Stimulation Attended  [id number]   [x] Instruction in HEP  [] Lumbar/Cervical Traction  [id number]   [x] Aquatic Therapy   52073 [] Cold/hotpack    [] Massage   64911      [] Dry Needling, 1 or 2 muscles  50954   [] Biofeedback, first 15 minutes   45018  [] Biofeedback, additional 15 minutes   34584 [] Dry Needling, 3 or more muscles  54083     []  Medication allergies reviewed for use of    Dexamethasone Sodium Phosphate 4mg/ml     with iontophoresis treatments. Pt is not allergic.     Frequency:  2 x/week for 12 visits    Todays Treatment:  Manual: MET Pubic clear, MET R anterior pelvic rotation  Precautions:  Exercises:  Exercise Reps/ Time Weight/ Level Comments   Piriformis stretch 90sec     ADD/ABD bracing 10x10\"     Bridge bracing 1010\"     IR/ER Bracing 10x10\"           Other:    Specific Instructions for next treatment: Begin aquatic    Evaluation Complexity:  History (Personal factors, comorbidities) [x] 0 [] 1-2 [] 3+   Exam (limitations, restrictions) [x] 1-2 [] 3 [] 4+   Clinical presentation (progression) [x] Stable [] Evolving  [] Unstable   Decision Making [x] Low [] Moderate [] High    [x] Low Complexity [] Moderate Complexity [] High Complexity       Treatment Charges: Mins Units   [x] Evaluation       [x]  Low       []  Moderate       []  High 35 1   []  Modalities     [x]  Ther Exercise 10 1   [x]  Manual Therapy 5    []  Ther Activities     []  Aquatics     []  Vasocompression     []  Other       TOTAL TREATMENT TIME: 50    Time in: 1130      Time out: 1230    Electronically signed by: Rossy Grider PT        Physician Signature:________________________________Date:__________________  By signing above or cosigning this note, I have reviewed this plan of care and certify a need for medically necessary rehabilitation services.      *PLEASE SIGN ABOVE AND FAX BACK ALL PAGES*

## 2020-09-21 ENCOUNTER — HOSPITAL ENCOUNTER (OUTPATIENT)
Dept: PHYSICAL THERAPY | Facility: CLINIC | Age: 30
Setting detail: THERAPIES SERIES
Discharge: HOME | End: 2020-09-21
Payer: COMMERCIAL

## 2020-09-21 PROCEDURE — 97113 AQUATIC THERAPY/EXERCISES: CPT

## 2020-09-21 NOTE — FLOWSHEET NOTE
Ankle ROM  Diagonals 1/2    Lunges   Elbow flex/ext      Pron/Sup    Functional Exercise  Wrist AROM    Step      Wall Push-ups  Deep H20/    SLS  Bike 3m   Breast Stroke on Noodle  Hip abd/add    Noodle Twist  Hip flex/ext    Noodle Push down  Hip IR/ER 20x   Kickboard push/pull  Knee flex/ext      Push/pull on [employer] 5m   Other:    Specific Instructions for next treatment:dynamic ex    Treatment Charges: Mins Units   []  Modalities     []  Ther Exercise     []  Manual Therapy     []  Ther Activities     [x]  Aquatics 30 2   []  Other       Assessment: [x] Progressing toward goals. Initiated light aquatic ex per flow sheet with fair cherelle and felt a pull L LE during 3 way hip ex. Pt feels tighter on that side. Sore across LB after static ex, VC for TA contr. To deep water for pelvis rotator ex and for lumbar decompression. Pt feels good leaving tx with less pain, but cont to notice soreness LB. Will cont to progress ex as cherelle. [] No change. [] Other:  STG: (to be met in 6 treatments)  1. ? Pain: Patient to report 2/10 pain at worst  2. ? ROM:  3. ? Strength: Patient to demonstrate the ability to maintain neutral pelvic alignment  4. ? Function: Patient to demonstrate the ability to FB   5. Patient to be independent with home exercise program as demonstrated by performance with correct form without cues. 6. Demonstrate Knowledge of fall prevention  LTG: (to be met in 12 treatments)  1. Patient to report 0/10 pain with functional activities  2. Patient to report 70/80 on LEFI  3. Patient to demonstrate the ability to transfers without hesitation        Patient goals: To eliminate pain and increase function       Pt. Education:  [x] Yes  [] No  [] Reviewed Prior HEP/Ed  Method of Education: [x] Verbal  [] Demo  [] Written  Comprehension of Education:  [x] Verbalizes understanding. [] Demonstrates understanding. [] Needs review. [] Demonstrates/verbalizes HEP/Ed previously given.      Plan: [x] Continue per plan of care.    [] Other:      Time In:4:25pm            Time Out: 5:00 pm    Electronically signed by:  Ronald Vega PTA

## 2020-09-23 ENCOUNTER — HOSPITAL ENCOUNTER (OUTPATIENT)
Dept: PHYSICAL THERAPY | Facility: CLINIC | Age: 30
Setting detail: THERAPIES SERIES
Discharge: HOME | End: 2020-09-23
Payer: COMMERCIAL

## 2020-09-23 PROCEDURE — G0283 ELEC STIM OTHER THAN WOUND: HCPCS

## 2020-09-23 PROCEDURE — 97140 MANUAL THERAPY 1/> REGIONS: CPT

## 2020-09-23 NOTE — FLOWSHEET NOTE
neutral pelvic alignment  4. ? Function: Patient to demonstrate the ability to FB   5. Patient to be independent with home exercise program as demonstrated by performance with correct form without cues. 6. Demonstrate Knowledge of fall prevention  LTG: (to be met in 12 treatments)  1. Patient to report 0/10 pain with functional activities  2. Patient to report 70/80 on LEFI  3. Patient to demonstrate the ability to transfers without hesitation        Patient goals: To eliminate pain and increase function       Pt. Education:  [x] Yes  [] No  [x] Reviewed Prior HEP/Ed  Method of Education: [x] Verbal  [] Demo  [] Written  Comprehension of Education:  [] Verbalizes understanding. [] Demonstrates understanding. [] Needs review. [] Demonstrates/verbalizes HEP/Ed previously given. Plan: [x] Continue current frequency toward long and short term goals.           Time In:1100           Time Out: 1200    Electronically signed by:  Orion Mancera PT

## 2020-09-28 ENCOUNTER — HOSPITAL ENCOUNTER (OUTPATIENT)
Dept: PHYSICAL THERAPY | Facility: CLINIC | Age: 30
Setting detail: THERAPIES SERIES
Discharge: HOME | End: 2020-09-28
Payer: COMMERCIAL

## 2020-09-28 PROCEDURE — 97113 AQUATIC THERAPY/EXERCISES: CPT

## 2020-09-28 NOTE — FLOWSHEET NOTE
Other:       Time In:4:30pm            Time Out: 5:10 pm    Electronically signed by:  Gerald Jimenez PTA

## 2020-10-01 ENCOUNTER — HOSPITAL ENCOUNTER (OUTPATIENT)
Dept: PHYSICAL THERAPY | Facility: CLINIC | Age: 30
Setting detail: THERAPIES SERIES
Discharge: HOME | End: 2020-10-01
Payer: COMMERCIAL

## 2020-10-01 PROCEDURE — 97110 THERAPEUTIC EXERCISES: CPT

## 2020-10-01 NOTE — FLOWSHEET NOTE
[] [address]  [address]  [phone]  F: [phone] [] [address]  [facility]   [address]  P: [phone]  F: [phone] [x] [place]  [address] [facility]  P: [phone]  F: [phone] [] [address]  [facility]   [address]   [place]: [phone]  F: [phone]      Physical Therapy Daily Treatment Note    Date:  10/1/2020  Patient Name:  Liv Cedeño    :  1990  MRN: [id number]   Physician: Rere West MD  Insurance: [employer] 14JB  Medical Diagnosis:   M54.5 (ICD-10-CM) - Low back pain, non-specific   Rehab Codes: M54.5  Onset Date: 2020             Next 's appt. : tbd          Visit# / total visits:                     Cancels/No Shows:     Subjective:    Pain:  [x] Yes  [] No Location: R SIJ 5/10   Pain altered Tx:  [] No  [] Yes  Action:  Comments: Patient reports seeing chiropractor on Monday and having an adjustment. She is attending every 2 wks    Objective:  Modalities:   Precautions:  Exercises:  Exercise Reps/ Time Weight/ Level Comments   Piriformis stretch x90     Roll up x10     Hip release x20     Dead bug x20     Clam shells 30 degrees x20     Bridge w/ ball fatigue           TVA edu      Other:  Manual:STR: bilateral piriformis, gentle LAD L LE prone      Treatment Charges: Mins Units   []  Modalities     [x]  Ther Exercise 40 3   [x]  Manual Therapy 5 nc   []  Ther Activities     []  Aquatics     []  Vasocompression     []  Other     Total Treatment time 45 3       Assessment: [] Progressing toward goals. [] No change. Patient edu in importance of TVA activation to assist in lumbo pelvic stabilization. Tactile cues used to improve proprioceptive awareness of pelvic position and increased activation.  Patient demonstrated and verbalized good understanding. Patient given HO to progress HEP     [x] Other:Patient able to obtain pelvic alignment post treatment. Ended with estim due to demonstration of soreness with tansfers. STG: (to be met in 6 treatments)  1. ? Pain: Patient to report 2/10 pain at worst  2. ? ROM:  3. ? Strength: Patient to demonstrate the ability to maintain neutral pelvic alignment  4. ? Function: Patient to demonstrate the ability to FB   5. Patient to be independent with home exercise program as demonstrated by performance with correct form without cues. 6. Demonstrate Knowledge of fall prevention  LTG: (to be met in 12 treatments)  1. Patient to report 0/10 pain with functional activities  2. Patient to report 70/80 on LEFI  3. Patient to demonstrate the ability to transfers without hesitation        Patient goals: To eliminate pain and increase function       Pt. Education:  [x] Yes  [] No  [x] Reviewed Prior HEP/Ed  Method of Education: [x] Verbal  [] Demo  [] Written  Comprehension of Education:  [] Verbalizes understanding. [] Demonstrates understanding. [] Needs review. [] Demonstrates/verbalizes HEP/Ed previously given. Plan: [x] Continue current frequency toward long and short term goals.           Time In:1100           Time Out: 1200    Electronically signed by:  Itzel Hopper PT

## 2020-10-05 ENCOUNTER — HOSPITAL ENCOUNTER (OUTPATIENT)
Dept: PHYSICAL THERAPY | Facility: CLINIC | Age: 30
Setting detail: THERAPIES SERIES
Discharge: HOME | End: 2020-10-05
Payer: COMMERCIAL

## 2020-10-05 PROCEDURE — 97113 AQUATIC THERAPY/EXERCISES: CPT

## 2020-10-05 NOTE — FLOWSHEET NOTE
Mini-squats 15 15 15 Rowing      4-way hip  15 15 15 Arm Circles      Hamstring curls 15 15 15 UT shrugs/rolls      Hip Circles/Fig 8    Scap squeezes      Ankle ROM    Diagonals 1/2      Lunges     Elbow flex/ext          Pron/Sup      Functional Exercise    Wrist AROM      Step          Wall Push-ups    Deep H20/      SLS    Bike 3m 3m 3m   Breast Stroke on Noodle    Hip abd/add  2m 3m   Noodle Twist    Hip flex/ext      Noodle Push down    Hip IR/ER 20x 20x 20x   Kickboard push/pull    Knee flex/ext          Push/pull on [employer] 5m 5m 5m   Other:    Specific Instructions for next treatment:    Treatment Charges: Mins Units   []  Modalities     []  Ther Exercise     []  Manual Therapy     []  Ther Activities     [x]  Aquatics 30 2   []  Other       Assessment: [x] Progressing toward goals. Cont with aquatic ex per flow sheet with good cherelle with VC for ex recall and to keep ex in comfortable range, markos hip abd. Pt feels good after deep water ex and less pain leaving the pool. Will progress dynamic ex next visit as cherelle. [] No change. [] Other:  STG: (to be met in 6 treatments)  1. ? Pain: Patient to report 2/10 pain at worst  2. ? ROM:  3. ? Strength: Patient to demonstrate the ability to maintain neutral pelvic alignment  4. ? Function: Patient to demonstrate the ability to FB   5. Patient to be independent with home exercise program as demonstrated by performance with correct form without cues. 6. Demonstrate Knowledge of fall prevention  LTG: (to be met in 12 treatments)  1. Patient to report 0/10 pain with functional activities  2. Patient to report 70/80 on LEFI  3. Patient to demonstrate the ability to transfers without hesitation        Patient goals: To eliminate pain and increase function       Pt. Education:  [x] Yes  [] No  [] Reviewed Prior HEP/Ed  Method of Education: [x] Verbal  [] Demo  [] Written  Comprehension of Education:  [x] Verbalizes understanding.   [] Demonstrates understanding. [] Needs review. [] Demonstrates/verbalizes HEP/Ed previously given. Plan: [x] Continue per plan of care.    [] Other:      Time In:4:20pm            Time Out: 5:05 pm    Electronically signed by:  Sindy Meza PTA

## 2020-10-08 ENCOUNTER — HOSPITAL ENCOUNTER (OUTPATIENT)
Dept: PHYSICAL THERAPY | Facility: CLINIC | Age: 30
Setting detail: THERAPIES SERIES
Discharge: HOME | End: 2020-10-08
Payer: COMMERCIAL

## 2020-10-08 PROCEDURE — 97110 THERAPEUTIC EXERCISES: CPT

## 2020-10-08 NOTE — FLOWSHEET NOTE
[] [address]  [address]  [phone]  F: [phone] [] [address]  [facility] 36   [address]  P: [phone]  F: [phone] [x] [place]  [address] [facility]  P: [phone]  F: [phone] [] [address]  [facility]   [address]   [place]: [phone]  F: [phone]      Physical Therapy Daily Treatment Note    Date:  10/8/2020  Patient Name:  Cory Fairchild    :  1990  MRN: [id number]   Physician: Jagdish Pickering MD  Insurance: [facility] 38EP  Medical Diagnosis:   M54.5 (ICD-10-CM) - Low back pain, non-specific   Rehab Codes: M54.5  Onset Date: 2020             Next 's appt. : tbd          Visit# / total visits:                     Cancels/No Shows:     Subjective:    Pain:  [x] Yes  [] No Location: R SIJ 2-3/10   Pain altered Tx:  [] No  [] Yes  Action:  Comments: Patient reports seeing chiropractor yesterday. She states that her pain has been improving since last session and she has not been having the same intense pain and reduced occurrence. She is also able to get out of bed more easily      Objective:  Modalities:   Precautions:  Exercises:  Exercise Reps/ Time Weight/ Level Comments   Piriformis stretch 90sec     Psoas stretch 90sec     Burrito stretch x90     Roll up x10     Hip release full x20  VC's t set abd's prior to movement   Dead bug x20     Clam shells 30 degrees x20     Bridge w/ ball 10x5\"  Added to HEP         TVA edu      Other:    Treatment Charges: Mins Units   []  Modalities     [x]  Ther Exercise 40 3   []  Manual Therapy     []  Ther Activities     []  Aquatics     []  Vasocompression     []  Other     Total Treatment time 45 3       Assessment: [] Progressing toward goals. [] No change.  Progressed TVA strength challenges with increasing lever arm. Patient is now able to self correct as her proprioceptive awareness has improved. Slow movements and reps encouraged to improve control throughout ROM. [x] Other:Patient able to obtain pelvic alignment post treatment. et in 6 treatments)  1. ? Pain: Patient to report 2/10 pain at worst  2. ? ROM:  3. ? Strength: Patient to demonstrate the ability to maintain neutral pelvic alignment  4. ? Function: Patient to demonstrate the ability to FB   5. Patient to be independent with home exercise program as demonstrated by performance with correct form without cues. 6. Demonstrate Knowledge of fall prevention  LTG: (to be met in 12 treatments)  1. Patient to report 0/10 pain with functional activities  2. Patient to report 70/80 on LEFI  3. Patient to demonstrate the ability to transfers without hesitation        Patient goals: To eliminate pain and increase function       Pt. Education:  [x] Yes  [] No  [x] Reviewed Prior HEP/Ed  Method of Education: [x] Verbal  [] Demo  [] Written  Comprehension of Education:  [] Verbalizes understanding. [] Demonstrates understanding. [] Needs review. [] Demonstrates/verbalizes HEP/Ed previously given. Plan: [x] Continue current frequency toward long and short term goals.           Time In:1130           Time Out: 1230    Electronically signed by:  Rosetta Mares PT

## 2020-10-12 ENCOUNTER — HOSPITAL ENCOUNTER (OUTPATIENT)
Dept: PHYSICAL THERAPY | Facility: CLINIC | Age: 30
Setting detail: THERAPIES SERIES
Discharge: HOME | End: 2020-10-12
Payer: COMMERCIAL

## 2020-10-12 PROCEDURE — 97113 AQUATIC THERAPY/EXERCISES: CPT

## 2020-10-12 NOTE — FLOWSHEET NOTE
[] [address]       Physical Therapy [facility]       [address]        [address]       Phone: [phone]       Fax: [phone] [] Located within [facility]       Promotion at [address] [facility]       Phone: [phone]       Fax: [phone] [x] [address] [facility]  1500 [facility]   Phone: [phone]   Fax:  [phone]     Physical Therapy Daily  Aquatic Treatment Note    Date:  10/12/2020  Patient Name:  Thien Benitez    :  1990  MRN: [id number]  Physician: Gavi Mandujano MD  Insurance: [address]  Medical Diagnosis:   M54.5 (ICD-10-CM) - Low back pain, non-specific   Rehab Codes: M54.5  Onset Date: 2020             Next 's appt. : tbd      Visit# / total visits:   Cancels/No Shows:     Subjective:    Pain:  [x] Yes  [] No Location: LB Pain Rating: (0-10 scale) 5/10  Pain altered Tx:  [x] No  [] Yes  Action:  Comments: Pt reports feeling more pain in LB due to standing more yesterday cooking and visiting with her Grandparents. Pt also notes did not have time to do her stretches today.   Objective:     KEY  B = Belt G = Gloves N = Noodle   C = Cuffs K = Kickboard P = Paddles   CC = Cervical Collar L = Laps T = Theratube   DB = Dumbells M = Minutes W = Weights     Exercises/Activities  Warm-up/Amb 10/5 10/12 Dynamic Exercises 10/5 10/12   Forward 3L 3L March 3L 3L   Sideways 3L 3L Squat     Backwards 3L 3L Retro HS curls        Retro w/ hip ext  3L   Stretches   Braiding     Gastroc/Soleus   Heel to Toe amb     Hamstring   Toe amb     Hip flexor   Heel amb     Piriformis(deep water while hanging) 3x15\" 3x15\"      SKTC        Pec Stretch        Post Deltoid   Static Exercises UE        Shoulder flex/ext     Static Exercises LE   Shoulder abd/add     Heel/toe raises 15 20 Shoulder H.  abd/add     Marches 15 20 Shoulder IR/ER     Mini-squats 15 20 Rowing     4-way hip  15 20 Arm Circles     Hamstring curls 15 20 UT shrugs/rolls     Hip Circles/Fig 8   Scap squeezes     Ankle ROM   Diagonals 1/2     Lunges    Elbow flex/ext        Pron/Sup     Functional Exercise   Wrist AROM     Step        Wall Push-ups   Deep H20/     SLS   Bike 3m 3m   Breast Stroke on Noodle   Hip abd/add 3m 3m   Noodle Twist   Hip flex/ext     Noodle Push down   Hip IR/ER 20x 20x   Kickboard push/pull   Knee flex/ext        Push/pull on [facility] 5m 5m   Other:    Specific Instructions for next treatment:Kick board push/pull    Treatment Charges: Mins Units   []  Modalities     []  Ther Exercise     []  Manual Therapy     []  Ther Activities     [x]  Aquatics 30 2   []  Other       Assessment: [x] Progressing toward goals. Cont with aquatic ex per flow sheet with good cherelle and able to increase reps and add to dynamic ex. VC for TA contr throughout tx and proper tech with retro amb with hip ext. Pt feels sore after tx and like she had a good workout today. [] No change. [] Other:  STG: (to be met in 6 treatments)  1. ? Pain: Patient to report 2/10 pain at worst  2. ? ROM:  3. ? Strength: Patient to demonstrate the ability to maintain neutral pelvic alignment  4. ? Function: Patient to demonstrate the ability to FB   5. Patient to be independent with home exercise program as demonstrated by performance with correct form without cues. 6. Demonstrate Knowledge of fall prevention  LTG: (to be met in 12 treatments)  1. Patient to report 0/10 pain with functional activities  2. Patient to report 70/80 on LEFI  3. Patient to demonstrate the ability to transfers without hesitation        Patient goals: To eliminate pain and increase function       Pt. Education:  [x] Yes  [] No  [] Reviewed Prior HEP/Ed  Method of Education: [x] Verbal  [] Demo  [] Written  Comprehension of Education:  [x] Verbalizes understanding. [] Demonstrates understanding. [] Needs review.   [] Demonstrates/verbalizes HEP/Ed previously given.     Plan: [x] Continue per plan of care.    [] Other:      Time In:4:25pm            Time Out: 5:10 pm    Electronically signed by:  Jennifer Duarte PTA

## 2020-10-15 ENCOUNTER — HOSPITAL ENCOUNTER (OUTPATIENT)
Dept: PHYSICAL THERAPY | Facility: CLINIC | Age: 30
Setting detail: THERAPIES SERIES
Discharge: HOME | End: 2020-10-15
Payer: COMMERCIAL

## 2020-10-15 PROCEDURE — G0283 ELEC STIM OTHER THAN WOUND: HCPCS

## 2020-10-15 PROCEDURE — 97140 MANUAL THERAPY 1/> REGIONS: CPT

## 2020-10-15 NOTE — FLOWSHEET NOTE
[] [address]  [address]  [phone]  F: [phone] [] [address]  formerly [facility] 36   [address]  P: [phone]  F: [phone] [x] [address]  [address] [facility]  P: [phone]  F: [phone] [] [address]  [facility]   [address]   [place]: [phone]  F: [phone]      Physical Therapy Daily Treatment Note    Date:  10/15/2020  Patient Name:  Vidhya Martinez    :  1990  MRN: [id number]   Physician: Santiago Ya MD  Insurance: [address]  Medical Diagnosis:   M54.5 (ICD-10-CM) - Low back pain, non-specific   Rehab Codes: M54.5  Onset Date: 2020             Next 's appt. : tbd          Visit# / total visits:                     Cancels/No Shows: 0/0    Subjective:    Pain:  [x] Yes  [] No Location: R SIJ    Pain altered Tx:  [] No  [] Yes  Action:  Comments:Patient arrived to therapy today reporting experiencing increased pain that began -5/10 sitting -7/10 all movements    Objective:  Modalities: estim IFC sweep [id number]  Manual: MET pubic clear, MET R anterior pelvic rotation, bilateral STR QL, Piriformis, rectus femoris, sacral rub  Precautions:  Exercises: HELD TODAY  Exercise Reps/ Time Weight/ Level Comments   Piriformis stretch 90sec     Psoas stretch 90sec     Burrito stretch x90     Roll up x10     Hip release full x20  VC's t set abd's prior to movement   Dead bug x20     Clam shells 30 degrees x20     Bridge w/ ball 10x5\"  Added to HEP         TVA edu      Other:    Treatment Charges: Mins Units   [x]  Modalities 15 1   [x]  Ther Exercise     []  Manual Therapy 30 2   []  Ther Activities     []  Aquatics     []  Vasocompression     []  Other     Total Treatment time 45 3       Assessment: [] Progressing toward goals.    [] No change. Patient demonstrated pelvic alignment deficits and movement asymmetries. Patient alignment palpated in neutral upon leaving and added estim to reduce irritation. Patient was encouraged to continue with stretches and stabilization exercises as able      to be met. in 6 treatments)  1. ? Pain: Patient to report 2/10 pain at worst  2. ? ROM:  3. ? Strength: Patient to demonstrate the ability to maintain neutral pelvic alignment  4. ? Function: Patient to demonstrate the ability to FB   5. Patient to be independent with home exercise program as demonstrated by performance with correct form without cues. 6. Demonstrate Knowledge of fall prevention  LTG: (to be met in 12 treatments)  1. Patient to report 0/10 pain with functional activities  2. Patient to report 70/80 on LEFI  3. Patient to demonstrate the ability to transfers without hesitation        Patient goals: To eliminate pain and increase function       Pt. Education:  [x] Yes  [] No  [x] Reviewed Prior HEP/Ed  Method of Education: [x] Verbal  [] Demo  [] Written  Comprehension of Education:  [] Verbalizes understanding. [] Demonstrates understanding. [] Needs review. [] Demonstrates/verbalizes HEP/Ed previously given. Plan: [x] Continue current frequency toward long and short term goals.           Time In:1130           Time Out: 1230    Electronically signed by:  Taras Mandujano PT

## 2020-10-19 ENCOUNTER — HOSPITAL ENCOUNTER (OUTPATIENT)
Dept: PHYSICAL THERAPY | Facility: CLINIC | Age: 30
Setting detail: THERAPIES SERIES
Discharge: HOME | End: 2020-10-19
Payer: COMMERCIAL

## 2020-10-19 PROCEDURE — 97110 THERAPEUTIC EXERCISES: CPT

## 2020-10-19 NOTE — FLOWSHEET NOTE
[] [address]  [address]  [phone]  F: [phone] [] [address]  [facility] 36   [address]  P: [phone]  F: [phone] [x] [place]  [address] [facility]  P: [phone]  F: [phone] [] [address]  [facility]   [address]   [place]: [phone]  F: [phone]      Physical Therapy Daily Treatment Note    Date:  10/19/2020  Patient Name:  Gini Gaspar    :  1990  MRN: [id number]   Physician: Alger Dakins, MD  Insurance: AETNA 97DD  Medical Diagnosis:   M54.5 (ICD-10-CM) - Low back pain, non-specific   Rehab Codes: M54.5  Onset Date: 2020             Next 's appt. : tbd          Visit# / total visits:                     Cancels/No Shows: 0/0    Subjective:    Pain:  [x] Yes  [] No Location: R SIJ    Pain altered Tx:  [] No  [] Yes  Action:  Comments:4/10 R SIJ patient going to see chiropractor after session today    Objective:  Modalities: estim IFC sweep [id number]  Manual: MET pubic clear, MET R anterior pelvic rotation, bilateral STR QL, Piriformis, rectus femoris, sacral rub--HELD TODAY  Precautions:  Exercises:   Exercise Reps/ Time Weight/ Level Comments Completed   Piriformis stretch 90sec      Psoas stretch 90sec      Burrito stretch x90      Roll up x10      Hip release full x10   x   TVA with scissors x15   x   TVA w/ circles x20   x   Bridge Bracing 10x10x\"   x   ADD/ABD Bracing 10x10\"   x   IR/ER BRacing 10x10\"   x   Other:    Treatment Charges: Mins Units   []  Modalities     [x]  Ther Exercise 30 2   []  Manual Therapy     []  Ther Activities     []  Aquatics     []  Vasocompression     []  Other     Total Treatment time 30 2       Assessment: [] Progressing toward goals. [] No change. Patient had the wrong time and came 30mins late. Focused remainder of time on stabilization as she was to see chiropractor after this session. Patient required minimal VC's to correct pelvic stabilization. Patient encouraged to complete exercises post chiropractor treatment to improve stabilization      to be met. in 6 treatments)  1. ? Pain: Patient to report 2/10 pain at worst  2. ? ROM:  3. ? Strength: Patient to demonstrate the ability to maintain neutral pelvic alignment  4. ? Function: Patient to demonstrate the ability to FB   5. Patient to be independent with home exercise program as demonstrated by performance with correct form without cues. 6. Demonstrate Knowledge of fall prevention  LTG: (to be met in 12 treatments)  1. Patient to report 0/10 pain with functional activities  2. Patient to report 70/80 on LEFI  3. Patient to demonstrate the ability to transfers without hesitation        Patient goals: To eliminate pain and increase function       Pt. Education:  [x] Yes  [] No  [x] Reviewed Prior HEP/Ed  Method of Education: [x] Verbal  [] Demo  [] Written  Comprehension of Education:  [] Verbalizes understanding. [] Demonstrates understanding. [] Needs review. [] Demonstrates/verbalizes HEP/Ed previously given. Plan: [x] Continue current frequency toward long and short term goals.           Time In:4pm           Time Out: 5pm    Electronically signed by:  Deana Hebert PT

## 2020-10-22 ENCOUNTER — HOSPITAL ENCOUNTER (OUTPATIENT)
Dept: PHYSICAL THERAPY | Facility: CLINIC | Age: 30
Setting detail: THERAPIES SERIES
Discharge: HOME | End: 2020-10-22
Payer: COMMERCIAL

## 2020-10-22 PROCEDURE — 97140 MANUAL THERAPY 1/> REGIONS: CPT

## 2020-10-22 PROCEDURE — G0283 ELEC STIM OTHER THAN WOUND: HCPCS

## 2020-10-22 NOTE — FLOWSHEET NOTE
[] [address]  [address]  [phone]  F: [phone] [] [address]  [facility] 36   [address]  P: [phone]  F: [phone] [x] [place]  [address] [facility]  P: [phone]  F: [phone] [] [address]  [facility]   [address]   [place]: [phone]  F: [phone]      Physical Therapy Daily Treatment Note    Date:  10/22/2020  Patient Name:  Kolton Erickson    :  1990  MRN: [id number]   Physician: Akin Rivera MD  Insurance: AETNA 65KG  Medical Diagnosis:   M54.5 (ICD-10-CM) - Low back pain, non-specific   Rehab Codes: M54.5  Onset Date: 2020             Next 's appt. : tbd          Visit# / total visits: 10/12                    Cancels/No Shows: 0/0    Subjective:    Pain:  [x] Yes  [] No Location: R SIJ 6/10   Pain altered Tx:  [] No  [] Yes  Action:  Comments:Patient reports seeing chiropractor Monday She was feeling better after then began having pain with dressing Tuesday evening     Objective:  Modalities: estim IFC sweep [id number]  Manual: MET pubic clear, MET R anterior pelvic rotation, bilateral STR QL, Piriformis, glut medius, lumbar paraspinals  Precautions:  Exercises: HELD TODAY  Exercise Reps/ Time Weight/ Level Comments   PPT x25     Piriformis stretch 90sec     Psoas stretch 90sec     Burrito stretch x90     Roll up x10     Hip release full x20  VC's t set abd's prior to movement   Dead bug x20     Clam shells 30 degrees x20     Bridge w/ ball 10x5\"  Added to HEP         TVA edu      Other:    Treatment Charges: Mins Units   [x]  Modalities 15 1   []  Ther Exercise     [x]  Manual Therapy 30 2   []  Ther Activities     []  Aquatics     []  Vasocompression     []  Other     Total Treatment time 45 3       Assessment: []

## 2020-10-28 ENCOUNTER — HOSPITAL ENCOUNTER (OUTPATIENT)
Dept: PHYSICAL THERAPY | Facility: CLINIC | Age: 30
Setting detail: THERAPIES SERIES
Discharge: HOME | End: 2020-10-28
Payer: COMMERCIAL

## 2020-10-28 PROCEDURE — 97110 THERAPEUTIC EXERCISES: CPT

## 2020-10-28 NOTE — FLOWSHEET NOTE
[] [address]  [address]  [phone]  F: [phone] [] [address]  [facility] 36   [address]  P: [phone]  F: [phone] [x] [address]  [address] [facility]  P: [phone]  F: [phone] [] [address]  [facility]   [address]   [place]: [phone]  F: [phone]      Physical Therapy Daily Treatment Note    Date:  10/28/2020  Patient Name:  Kurt Olson    :  1990  MRN: [id number]   Physician: Rafita Amaral MD  Insurance: AETNA 55XS  Medical Diagnosis:   M54.5 (ICD-10-CM) - Low back pain, non-specific   Rehab Codes: M54.5  Onset Date: 2020             Next 's appt. : tbd          Visit# / total visits:                     Cancels/No Shows: 0/0    Subjective:    Pain:  [x] Yes  [] No Location: R SIJ 2/10   Pain altered Tx:  [] No  [] Yes  Action:  Comments:Patient reports feeling much better as she has had her  use a hypervolt on buttock and HS before bed and she has had very good days since beginning this. She is only experiencing intermittent residual pain into R thighabove knee She also reports seeing the physician Monday and he is referring her to pain management.       Objective:  Modalities:   Manual: hypervolt Piriformis, glut medius, lumbar paraspinals  Precautions:  Exercises:   Exercise Reps/ Time Weight/ Level Comments Completed Today   PPT x30   x   Piriformis stretch 90sec      Psoas stretch 90sec      Burrito stretch x90      Roll up x10      Hip release full x20  VC's t set abd's prior to movement x   Dead bug 90/90 x20  1 side at a time x   4 pt kneel w/ UE slidding x20   x   SL multifidus rotation x30  W/ foam roller x   Bear crawl lift x5   x   SB rhythmic stabs 3x60\"  Table top supine x   SB alphabet supine x2  Caps and lower case x   Clam shells 30 degrees x20      Bridge w/ ball 10x5\"  Added to HEP           TVA edu       Other:    Treatment Charges: Mins Units   []  Modalities     [x]  Ther Exercise 45 3   []  Manual Therapy     []  Ther Activities     []  Aquatics     []  Vasocompression     []  Other     Total Treatment time 45 3       Assessment: [x] Progressing toward goals. Patient transfers and bed mobility are much improved today without any painful hesitation demonstrated. Progressed TVA strengthening from supine to 4 pt kneeling to improve core stabilization. Added additional exercises to strengthening multifidus stability with rhythmic stabilization. Multi VC's throughout all exercises to correct alignment and abdominal control   [] No change. To be met. in 6 treatments)  1. ? Pain: Patient to report 2/10 pain at worst  2. ? ROM:  3. ? Strength: Patient to demonstrate the ability to maintain neutral pelvic alignment  4. ? Function: Patient to demonstrate the ability to FB   5. Patient to be independent with home exercise program as demonstrated by performance with correct form without cues. 6. Demonstrate Knowledge of fall prevention  LTG: (to be met in 12 treatments)  1. Patient to report 0/10 pain with functional activities  2. Patient to report 70/80 on LEFI  3. Patient to demonstrate the ability to transfers without hesitation        Patient goals: To eliminate pain and increase function       Pt. Education:  [x] Yes  [] No  [x] Reviewed Prior HEP/Ed  Method of Education: [x] Verbal  [] Demo  [] Written  Comprehension of Education:  [] Verbalizes understanding. [] Demonstrates understanding. [] Needs review. [] Demonstrates/verbalizes HEP/Ed previously given. Plan: [x] Continue current frequency toward long and short term goals.           Time In: 1pm           Time Out: 2pm    Electronically signed by:  Jameson Emery PT

## 2020-10-30 ENCOUNTER — HOSPITAL ENCOUNTER (OUTPATIENT)
Dept: PHYSICAL THERAPY | Facility: CLINIC | Age: 30
Setting detail: THERAPIES SERIES
Discharge: HOME | End: 2020-10-30
Payer: COMMERCIAL

## 2020-10-30 PROCEDURE — 97110 THERAPEUTIC EXERCISES: CPT

## 2020-10-30 NOTE — FLOWSHEET NOTE
[]  Vasocompression     []  Other     Total Treatment time 45 3       Assessment: [x] Progressing toward goals. Patient continues to demonstrate improvement in bed mobility and transfer ability. Continued with lumbo pelvic stabilization. VC's to correct alignment and prevent compensatory patterns. Patient  was given HO to continue at home    R DF  Knee extended -15  Knee flexed -5          To be met. in 6 treatments)  1. ? Pain: Patient to report 2/10 pain at worst  2. ? ROM:  3. ? Strength: Patient to demonstrate the ability to maintain neutral pelvic alignment  4. ? Function: Patient to demonstrate the ability to FB   5. Patient to be independent with home exercise program as demonstrated by performance with correct form without cues. 6. Demonstrate Knowledge of fall prevention  LTG: (to be met in 12 treatments)  1. Patient to report 0/10 pain with functional activities  2. Patient to report 70/80 on LEFI  3. Patient to demonstrate the ability to transfers without hesitation        Patient goals: To eliminate pain and increase function       Pt. Education:  [x] Yes  [] No  [x] Reviewed Prior HEP/Ed  Method of Education: [x] Verbal  [] Demo  [] Written  Comprehension of Education:  [] Verbalizes understanding. [] Demonstrates understanding. [] Needs review. [] Demonstrates/verbalizes HEP/Ed previously given. Plan: [x] Continue current frequency toward long and short term goals.           Time In: 9am           Time Out: 10am    Electronically signed by:  Ya Robles PT

## 2020-11-03 ENCOUNTER — APPOINTMENT (OUTPATIENT)
Dept: PHYSICAL THERAPY | Facility: CLINIC | Age: 30
End: 2020-11-03
Payer: COMMERCIAL

## 2020-11-04 ENCOUNTER — HOSPITAL ENCOUNTER (OUTPATIENT)
Dept: PHYSICAL THERAPY | Facility: CLINIC | Age: 30
Setting detail: THERAPIES SERIES
Discharge: HOME | End: 2020-11-04
Payer: COMMERCIAL

## 2020-11-04 PROCEDURE — 97110 THERAPEUTIC EXERCISES: CPT

## 2020-11-04 NOTE — PROGRESS NOTES
[] [address]  [address]  [phone]  F: [phone] [] [address]  [facility] 36   [address]  P: [phone]  F: [phone] [] [address]  [address] [facility]  P: [phone]  F: [phone] [] [address]  P: [phone]  F: [phone] [] [address]  [facility]   [address]   [place]: [phone]  F: [phone]      Physical Therapy Progress Note    Date: 2020      Patient: Mercy Ray  : 1990  MRN: [id number]    Rafa Xiong MD  Insurance: AET 40vs  Medical Diagnosis:   M54.5 (ICD-10-CM) - Low back pain, non-specific   Rehab Codes: M54.5  Onset Date: 2020             Next 's appt. : tbd       Visit# / total visits:                     Cancels/No Shows: 0/0    Subjective:  Pain:  [x] Yes  [] No  Location: R SIJ Pain Rating: (0-10 scale) 2/10  Pain altered Tx:  [] No  [] Yes  Action:  Comments: Patient reports seeing chiropractor Monday and has been compliant with HEP.   Objective:  Test Measurements:  Function:   LEFI=63 was 59/80  Transfers: Much improved since initial evaluation patient is able to change position with only minimal hesitation  Patient is demonstrating improved mobility with ambulation and functional activities  Continues to demonstrate stabilization mm fatigue with functional moements  Precautions:  Exercises:   Exercise Reps/ Time Weight/ Level Comments Completed Today   PPT x30     x              Piriformis stretch 90sec     x   Psoas stretch 90sec        Burrito stretch x90        Roll up x10     x   Hip release full x5     x   Dead bug 90/90 holding LE  x20   1 side at a time x   4 pt kneel w/ UE slidding x15     x  [place] side lying  3x5    incorporating with breath x   Bear crawl lift x10     x   SB rhythmic stabs 3x60\"   Table top supine    SB alphabet supine x2   Caps and lower case x   Clam shells 30 degrees x20    Added to HEP    Bridge w/ ball 10x5\"   Added to HEP               TVA edu     Healthy posture and movement patterns    Other:     Treatment Charges: Mins Units   []? Modalities       [x]? Ther Exercise 45 3   []? Manual Therapy       []? Ther Activities       []? Aquatics       []? Vasocompression       []? Other       Total Treatment time 45 3      Assessment:   Patient able to progress stabilization exercises today minimal VC's needed to improve TVA stabilization. Patient demonstrating a neutral gait pattern. To be met. in 6 treatments)  1. ? Pain: Patient to report 2/10 pain at worst--MET  2. ? ROM:  3. ? Strength: Patient to demonstrate the ability to maintain neutral pelvic alignment--ON GOING  4. ? Function: Patient to demonstrate the ability to FB--MET  5. Patient to be independent with home exercise program as demonstrated by performance with correct form without cues. --MET  6. Demonstrate Knowledge of fall prevention  LTG: (to be met in 12 treatments)-ON GOING  1. Patient to report 0/10 pain with functional activities  2. Patient to report 70/80 on LEFI  3.  Patient to demonstrate the ability to transfers without hesitation    Patient goals: To eliminate pain and increase function    Treatment Plan:  [x] Therapeutic Exercise   36960  [] Iontophoresis: 4 mg/mL Dexamethasone Sodium Phosphate  mAmin  68303   [] Therapeutic Activity  47435 [] Vasopneumatic cold with compression  42230    [] Gait Training   33772 [] Ultrasound   11810   [x] Neuromuscular Re-education  48579 [] Electrical Stimulation Unattended  28617   [x] Manual Therapy  60659 [] Electrical Stimulation Attended  14111   [x] Instruction in HEP  [] Lumbar/Cervical Traction  [id number]   [] Aquatic Therapy   73573 [] Cold/hotpack    [] Massage   [id number]      [] Dry Needling, 1 or 2 muscles  78826   [] Biofeedback, first 15 minutes   02742  [] Biofeedback, additional 15 minutes   20248 [] Dry Needling, 3 or more muscles  85520     Patient Status:     [x] Continue per initial plan of care. Treatment to continue to completion of original script    [] Additional visits necessary. [] Other:     Requested Frequency/Duration: 2 times per week for 12 treatments. Electronically signed by Reji Beck PT on 11/4/2020 at 1:04 PM     If you have any questions or concerns, please don't hesitate to call. Thank you for your referral.  Physician Signature:________________________________Date:__________________  By signing above or cosigning this note, I have reviewed this plan of care and certify a need for medically necessary rehabilitation services.      *PLEASE SIGN ABOVE AND FAX BACK ALL PAGES*

## 2020-11-06 ENCOUNTER — HOSPITAL ENCOUNTER (OUTPATIENT)
Dept: PHYSICAL THERAPY | Facility: CLINIC | Age: 30
Setting detail: THERAPIES SERIES
Discharge: HOME | End: 2020-11-06
Payer: COMMERCIAL

## 2020-11-06 NOTE — FLOWSHEET NOTE
[] [facility]) [facility] &  Therapy  [address]    [phone]  F: [phone]   [] [address] [facility] [address]   [address]  P: [phone]  F: [phone]  [] [address] &  Therapy  [address] [facility]  P: [phone]  F: [phone] [] [address] [employer] [address]  P: [phone]  F: [phone]  [] [address]  [address] [facility] 70   [address]   [place]: [phone]  F: [phone]   [] [facility]  [address] [facility] [address]  [place]: [phone]   F: [phone]     Physical Therapy Cancel/No Show note    Date: 2020  Patient: Nico Otoole  : 1990  MRN: [id number]    Cancels/No Shows to date:     For today's appointment patient:    [x]  Cancelled    [] Rescheduled appointment    [] No-show     Reason given by patient:    []  Patient ill    []  Conflicting appointment    [] No transportation      [x] Conflict with work    [] No reason given    [] Weather related    [] [facility]    [] Other:      Comments:        [x] Next appointment was confirmed    Electronically signed by: Carly Romano

## 2020-11-11 ENCOUNTER — HOSPITAL ENCOUNTER (OUTPATIENT)
Dept: PHYSICAL THERAPY | Facility: CLINIC | Age: 30
Setting detail: THERAPIES SERIES
Discharge: HOME | End: 2020-11-11
Payer: COMMERCIAL

## 2020-11-11 PROCEDURE — 97110 THERAPEUTIC EXERCISES: CPT

## 2020-11-11 NOTE — FLOWSHEET NOTE
[] [address]  [address]  [phone]  F: [phone] [] [address]  [facility] 36   [address]  P: [phone]  F: [phone] [x] [address]  [address] [facility]  P: [phone]  F: [phone] [] [address]  [facility]   [address]   [place]: [phone]  F: [phone]      Physical Therapy Daily Treatment Note    Date:  2020  Patient Name:  Lindsay Ritchie    :  1990  MRN: [id number]   Physician: Troy Reza MD  Insurance: [address]  Medical Diagnosis:   M54.5 (ICD-10-CM) - Low back pain, non-specific   Rehab Codes: M54.5  Onset Date: 2020             Next 's appt. : tbd          Visit# / total visits:                     Cancels/No Shows: 0/0    Subjective:    Pain:  [x] Yes  [] No Location: R SIJ  0/10   Pain altered Tx:  [] No  [] Yes  Action:  Comments:Patient reports she continues to feel better.  She only feels a pulling sensation when bending over    Objective:  Modalities:   Manual:   Precautions:  Exercises:   Exercise Reps/ Time Weight/ Level Comments Completed Today   PPT x30               Piriformis stretch 90sec      Psoas stretch 90sec   x   Burrito stretch x90      Roll up x10   x   Hip release full x5      Dead bug 90/full x15  1 LE maintain CKC x   4 pt kneel w/ UE ABD x10   x   Modified pigeon x10   x   Bear crawl lift x8   x   SB rhythmic stabs 3x60\"  Table top supine    SB alphabet supine x2  Caps and lower case    Clam shells 90 degrees x20   x   Bridge w/ ball 10x5\"  Added to HEP           TVA edu   Healthy posture and movement patterns x   Other:    Treatment Charges: Mins Units   []  Modalities     [x]  Ther Exercise 45 3   []  Manual Therapy     []  Ther Activities     []  Aquatics     []  Vasocompression []  Other     Total Treatment time 45 3       Assessment: [x] Progressing toward goals. Patient moving well. Progressed lumbo pelvic stabilization exercises in both supine and 4 pt kneel. VC's needed to maintain TVA activation patient able to increase reps before fatigue. R DF  Knee extended -15  Knee flexed -5             To be met. in 6 treatments)  1. ? Pain: Patient to report 2/10 pain at worst--MET  2. ? ROM:  3. ? Strength: Patient to demonstrate the ability to maintain neutral pelvic alignment--ON GOING  4. ? Function: Patient to demonstrate the ability to FB--MET  5. Patient to be independent with home exercise program as demonstrated by performance with correct form without cues. --MET  6. Demonstrate Knowledge of fall prevention  LTG: (to be met in 12 treatments)-ON GOING  1. Patient to report 0/10 pain with functional activities  2. Patient to report 70/80 on LEFI  3. Patient to demonstrate the ability to transfers without hesitation     Patient goals: To eliminate pain and increase function     Pt. Education:  [x] Yes  [] No  [x] Reviewed Prior HEP/Ed  Method of Education: [x] Verbal  [] Demo  [] Written  Comprehension of Education:  [] Verbalizes understanding. [] Demonstrates understanding. [] Needs review. [] Demonstrates/verbalizes HEP/Ed previously given. Plan: [x] Continue current frequency toward long and short term goals.           Time In: 1pm     Time Out: 2pm    Electronically signed by:  Octavia Joya PT

## 2020-11-13 ENCOUNTER — HOSPITAL ENCOUNTER (OUTPATIENT)
Dept: PHYSICAL THERAPY | Facility: CLINIC | Age: 30
Setting detail: THERAPIES SERIES
Discharge: HOME | End: 2020-11-13
Payer: COMMERCIAL

## 2020-11-13 PROCEDURE — 97110 THERAPEUTIC EXERCISES: CPT

## 2020-11-23 ENCOUNTER — HOSPITAL ENCOUNTER (OUTPATIENT)
Dept: PHYSICAL THERAPY | Facility: CLINIC | Age: 30
Setting detail: THERAPIES SERIES
Discharge: HOME | End: 2020-11-23
Payer: COMMERCIAL

## 2020-11-23 NOTE — FLOWSHEET NOTE
[] [address] [facility] &  Therapy  [address]    [phone]  F: [phone]   [] [address]  [facility] 36   [address]  P: [phone]  F: [phone]  [] [address] &  Therapy  [address] [facility]  P: [phone]  F: [phone] [] [address]  P: [phone]  F: [phone]  [] [address]  [address] [facility] 70   [address]   [place]: [phone]  F: [phone]   [] [place] [address] [facility] [address]  [place]: [phone]   F: [phone]     Physical Therapy Cancel/No Show note    Date: 2020  Patient: Chris Wylie  : 1990  MRN: [id number]    Cancels/No Shows to date: 0    For today's appointment patient:    [x]  Cancelled    [] Rescheduled appointment    [] No-show     Reason given by patient:    []  Patient ill    []  Conflicting appointment    [] No transportation      [] Conflict with work    [] No reason given    [] Weather related    [] COVID-19    [x] Other:      Comments:  Would like to cx appt until she can see Allyssa Mccrary.       [x] Next appointment was confirmed    Electronically signed by: Aman Castro PTA

## 2020-12-02 ENCOUNTER — HOSPITAL ENCOUNTER (OUTPATIENT)
Dept: PHYSICAL THERAPY | Facility: CLINIC | Age: 30
Setting detail: THERAPIES SERIES
Discharge: HOME | End: 2020-12-02
Payer: COMMERCIAL

## 2020-12-02 PROCEDURE — 97110 THERAPEUTIC EXERCISES: CPT

## 2020-12-02 NOTE — FLOWSHEET NOTE
Charges: Mins Units   []  Modalities     [x]  Ther Exercise 50 3   []  Manual Therapy     []  Ther Activities     []  Aquatics     []  Vasocompression     []  Other     Total Treatment time 50 3       Assessment: [x] Progressing toward goals. Patient presents to physical therapy demonstrating fluid movement patterns with transfers, ambulation and exercises. Reviewed HEP and patient educated in how to advance program and how to modify fitness exercises. Patient encouraged to maintain neutral alignments and complete movements with control. All questions answered Patient reports 0% functional deficits on LEFI 80/80 and all goals met             To be met. in 6 treatments)  1. ? Pain: Patient to report 2/10 pain at worst--MET  2. ? ROM:  3. ? Strength: Patient to demonstrate the ability to maintain neutral pelvic alignment--MET  4. ? Function: Patient to demonstrate the ability to FB--MET  5. Patient to be independent with home exercise program as demonstrated by performance with correct form without cues. --MET  6. Demonstrate Knowledge of fall prevention  LTG: (to be met in 12 treatments)-  1. Patient to report 0/10 pain with functional activities-MET  2. Patient to report 70/80 on LEFI -MET  3. Patient to demonstrate the ability to transfers without hesitation-MET     Patient goals: To eliminate pain and increase function     Pt. Education:  [x] Yes  [] No  [x] Reviewed Prior HEP/Ed  Method of Education: [x] Verbal  [] Demo  [] Written  Comprehension of Education:  [] Verbalizes understanding. [] Demonstrates understanding. [] Needs review. [] Demonstrates/verbalizes HEP/Ed previously given.      Plan: DC  To HEP        Time In: 2pm   Time Out: 3pm    Electronically signed by:  Ya Robles, PT

## 2020-12-29 ENCOUNTER — HOSPITAL ENCOUNTER (OUTPATIENT)
Age: 30
Discharge: HOME | End: 2020-12-31
Payer: COMMERCIAL

## 2020-12-29 ENCOUNTER — OFFICE VISIT (OUTPATIENT)
Dept: PRIMARY CARE CLINIC | Age: 30
End: 2020-12-29
Payer: COMMERCIAL

## 2020-12-29 ENCOUNTER — HOSPITAL ENCOUNTER (OUTPATIENT)
Dept: GENERAL RADIOLOGY | Age: 30
Discharge: HOME | End: 2020-12-31
Payer: COMMERCIAL

## 2020-12-29 VITALS
HEART RATE: 78 BPM | HEIGHT: 64 IN | DIASTOLIC BLOOD PRESSURE: 71 MMHG | SYSTOLIC BLOOD PRESSURE: 117 MMHG | TEMPERATURE: 98.2 F | BODY MASS INDEX: 35.85 KG/M2 | OXYGEN SATURATION: 97 % | RESPIRATION RATE: 18 BRPM | WEIGHT: 210 LBS

## 2020-12-29 DIAGNOSIS — M79.632 PAIN IN LEFT FOREARM: ICD-10-CM

## 2020-12-29 DIAGNOSIS — M25.522 LEFT ELBOW PAIN: ICD-10-CM

## 2020-12-29 DIAGNOSIS — S59.909A ELBOW INJURY, INITIAL ENCOUNTER: ICD-10-CM

## 2020-12-29 PROCEDURE — 73080 X-RAY EXAM OF ELBOW: CPT

## 2020-12-29 PROCEDURE — 73090 X-RAY EXAM OF FOREARM: CPT

## 2020-12-29 PROCEDURE — 99213 OFFICE O/P EST LOW 20 MIN: CPT | Performed by: PHYSICIAN ASSISTANT

## 2020-12-29 ASSESSMENT — ENCOUNTER SYMPTOMS
VOMITING: 0
CONSTIPATION: 0
SHORTNESS OF BREATH: 0
BACK PAIN: 0
NAUSEA: 0
DIARRHEA: 0
RHINORRHEA: 0
ABDOMINAL PAIN: 0
COUGH: 0
SINUS PAIN: 0

## 2020-12-29 NOTE — PROGRESS NOTES
[facility] CARE  [facility] [address] [address]  Dept: [phone]  Dept Fax: [phone]    Kurt Olson is a 26 y.o. female who presents today for her medical conditions/complaints as noted below. Chief Complaint   Patient presents with    Fall     onset 3 hours ago fell on deck and slipped on ice. Fell on L arm. Painful and lack of ROM. HPI:     Patient presents to the office for evaluation of left arm. Patient reports that she slipped on ice and fell on her porch this morning. She reports hitting her left elbow then back on the fall. Denies impacting head or neck. Denies LOC, pre-syncope, headaches, vision changes. Primary concern is pain surrounding the left elbow with lack of ROM. The pain is constant. Denies open wound. No extremity numbness or tingling. She has not tried any treatments prior to arrival.  Denies any other joint/back pain at this time. No other concerns. Arm Pain   The incident occurred 1 to 3 hours ago. The incident occurred at home. The injury mechanism was a fall. The pain is present in the left elbow. The pain radiates to the left arm. The pain is moderate. The pain has been constant since the incident. Pertinent negatives include no chest pain, muscle weakness, numbness or tingling. The symptoms are aggravated by movement. She has tried nothing for the symptoms. The treatment provided no relief.        No results found for: LABA1C          ( goal A1C is < 7)   No results found for: LABMICR  LDL Cholesterol (mg/dL)   Date Value   08/27/2020 66   04/14/2015 46       (goal LDL is <100)   AST (U/L)   Date Value   08/27/2020 23     ALT (U/L)   Date Value   08/27/2020 19     BUN (mg/dL)   Date Value   08/27/2020 13     BP Readings from Last 3 Encounters:   12/29/20 117/71   08/06/20 112/72   02/06/20 110/70          (goal 120/80)    Past Medical History:   Diagnosis Date    Allergic rhinitis Psychiatric/Behavioral: Negative for confusion, dysphoric mood and sleep disturbance. The patient is not nervous/anxious. All other systems reviewed and are negative. Objective:     Physical Exam  Vitals signs and nursing note reviewed. Constitutional:       General: She is not in acute distress. Appearance: Normal appearance. She is obese. HENT:      Head: Normocephalic. Mouth/Throat:      Mouth: Mucous membranes are moist.   Eyes:      Extraocular Movements: Extraocular movements intact. Conjunctiva/sclera: Conjunctivae normal.      Pupils: Pupils are equal, round, and reactive to light. Neck:      Musculoskeletal: Normal range of motion. Cardiovascular:      Rate and Rhythm: Normal rate and regular rhythm. Pulses: Normal pulses. Heart sounds: Normal heart sounds. Pulmonary:      Effort: Pulmonary effort is normal.      Breath sounds: Normal breath sounds. Musculoskeletal:      Right shoulder: She exhibits normal range of motion and no tenderness. Left shoulder: Normal. She exhibits normal range of motion and no tenderness. Right elbow: She exhibits normal range of motion and no swelling. No tenderness found. Left elbow: She exhibits decreased range of motion, swelling and effusion. Tenderness found. Radial head and lateral epicondyle tenderness noted. Right wrist: She exhibits normal range of motion, no tenderness, no bony tenderness and no swelling. Left wrist: She exhibits normal range of motion, no tenderness, no bony tenderness and no swelling. Cervical back: She exhibits normal range of motion and no tenderness. Right lower leg: No edema. Left lower leg: No edema. Lymphadenopathy:      Cervical: No cervical adenopathy. Skin:     General: Skin is warm. Capillary Refill: Capillary refill takes less than 2 seconds. Neurological:      General: No focal deficit present. Mental Status: She is alert and oriented to person, place, and time. Psychiatric:         Mood and Affect: Mood normal.         Behavior: Behavior normal.       /71 (Site: Left Upper Arm, Position: Sitting, Cuff Size: Medium Adult)   Pulse 78   Temp 98.2 °F (36.8 °C) (Temporal)   Resp 18   Ht 5' 4\" (1.626 m)   Wt 210 lb (95.3 kg)   SpO2 97%   BMI 36.05 kg/m²     Assessment:       ICD-10-CM    1. Left elbow pain  M25.522 XR ELBOW LEFT (MIN 3 VIEWS)   2. Elbow injury, initial encounter  S59.909A XR ELBOW LEFT (MIN 3 VIEWS)   3. Pain in left forearm  M79.632 XR RADIUS ULNA LEFT (2 VIEWS)            Plan:       Patient has predominant pain around the lateral and anterior left elbow. No obvious deformity or ecchymosis. Mild swelling to the joint. Left elbow and forearm x-rays to rule out fracture. Will refer to Ortho if needed based on x-ray results. - Recommended Tylenol, Ice, and rest.    Return if symptoms worsen or fail to improve. Orders Placed This Encounter   Procedures    XR ELBOW LEFT (MIN 3 VIEWS)     Standing Status:   Future     Number of Occurrences:   1     Standing Expiration Date:   12/29/2021     Order Specific Question:   Reason for exam:     Answer:   pain    XR RADIUS ULNA LEFT (2 VIEWS)     Standing Status:   Future     Number of Occurrences:   1     Standing Expiration Date:   12/29/2021     Order Specific Question:   Reason for exam:     Answer:   s/p fall, upper foream pain         Patient given educational materials - see patient instructions. Discussed use, benefit, and side effects of prescribedmedications. All patient questions answered. Pt voiced understanding. Reviewed health maintenance. Instructed to continue current medications, diet and exercise. Patient agreed with treatment plan. Follow up as directed.         Electronically signed by Danie Mendez PA-C on 12/29/2020 at 1:30 PM

## 2021-01-06 DIAGNOSIS — M25.522 LEFT ELBOW PAIN: Primary | ICD-10-CM

## 2021-01-07 ENCOUNTER — OFFICE VISIT (OUTPATIENT)
Dept: ORTHOPEDIC SURGERY | Age: 31
End: 2021-01-07
Payer: COMMERCIAL

## 2021-01-07 VITALS
HEIGHT: 64 IN | SYSTOLIC BLOOD PRESSURE: 118 MMHG | TEMPERATURE: 97 F | WEIGHT: 212 LBS | HEART RATE: 85 BPM | BODY MASS INDEX: 36.19 KG/M2 | DIASTOLIC BLOOD PRESSURE: 80 MMHG

## 2021-01-07 DIAGNOSIS — S50.02XA CONTUSION OF LEFT ELBOW, INITIAL ENCOUNTER: Primary | ICD-10-CM

## 2021-01-07 PROCEDURE — 99203 OFFICE O/P NEW LOW 30 MIN: CPT | Performed by: ORTHOPAEDIC SURGERY

## 2021-01-07 ASSESSMENT — ENCOUNTER SYMPTOMS
DIARRHEA: 0
CHEST TIGHTNESS: 0
APNEA: 0
VOMITING: 0
COUGH: 0
ABDOMINAL DISTENTION: 0
CONSTIPATION: 0
SHORTNESS OF BREATH: 0
ABDOMINAL PAIN: 0
NAUSEA: 0
COLOR CHANGE: 0

## 2021-01-07 NOTE — PATIENT INSTRUCTIONS
[address] [place] [address] and Sports Medicine    Dr. Louise Armas DO & Atiya Lora PA-C, ATC    Over the counter anti-inflammatory protocol (NSAIDS)    You may take the following over the counter medication for only 10-14 days to  reduce inflammation. If you develop an upset stomach, diarrhea, heartburn/GERD symptoms   discontinue immediately. If you need the medication on a long-term basis >greater than 10-14 days. Please contact your family doctor/PCP to discuss your options as you   will require ongoing medical monitoring. Over the Counter/OTC             Ibuprofen/Advil/Motrin                                                                                                            200 mg tablets                  3-4 tables 3 times a day with food             OR            Naproxen Sodium, Aleve                    220 mg tablets          2 tablets 2 times a day with food           You May Add                           Tylenol extra strength, Acetaminophen           500 mg tablets               2 tablets every 8 hours    You may alternate with the NSAIDS for pain. NO more than 3000 mg of Tylenol/acetaminophen in 24 hours. Look at any OTC medications for added  Tylenol/acetaminophen--cold/sinus/flu medication.  ----------------------------------------------------------------------------------------------------------------------  Patient Education     Elbow: Exercises  Introduction  Here are some examples of exercises for you to try. The exercises may be suggested for a condition or for rehabilitation. Start each exercise slowly. Ease off the exercises if you start to have pain. You will be told when to start these exercises and which ones will work best for you. How to do the exercises  Wrist flexor stretch   1. Extend your arm in front of you with your palm up. 2. Bend your wrist, pointing your hand toward the floor. 3. With your other hand, gently bend your wrist farther until you feel a mild to moderate stretch in your forearm. 4. Hold for at least 15 to 30 seconds. Repeat 2 to 4 times. Wrist extensor stretch   1. Repeat steps 1 to 4 of the stretch above but begin with your extended hand palm down. Ball or sock squeeze   1. Hold a tennis ball (or a rolled-up sock) in your hand. 2. Make a fist around the ball (or sock) and squeeze. 3. Hold for about 6 seconds, and then relax for up to 10 seconds. 4. Repeat 8 to 12 times. 5. Switch the ball (or sock) to your other hand and do 8 to 12 times. Wrist deviation   1. Sit so that your arm is supported but your hand hangs off the edge of a flat surface, such as a table. 2. Hold your hand out like you are shaking hands with someone. 3. Move your hand up and down. 4. Repeat this motion 8 to 12 times. 5. Switch arms. 6. Try to do this exercise twice with each hand. Wrist curls   1. Place your forearm on a table with your hand hanging over the edge of the table, palm up. 2. Place a 1- to 2-pound weight in your hand. This may be a dumbbell, a can of food, or a filled water bottle. 3. Slowly raise and lower the weight while keeping your forearm on the table and your palm facing up. 4. Repeat this motion 8 to 12 times. 5. Switch arms, and do steps 1 through 4.  6. Repeat with your hand facing down toward the floor. Switch arms. Biceps curls   1. Sit leaning forward with your legs slightly spread and your left hand on your left thigh. 2. Place your right elbow on your right thigh, and hold the weight with your forearm horizontal.  3. Slowly curl the weight up and toward your chest.  4. Repeat this motion 8 to 12 times. 5. Switch arms, and do steps 1 through 4. Follow-up care is a key part of your treatment and safety. Be sure to make and go to all appointments, and call your doctor if you are having problems. It's also a good idea to know your test results and keep a list of the medicines you take. Where can you learn more? Go to [email or web address] and sign in to your [employer] account. Enter M279 in the ShoutNow box to learn more about \"Elbow: Exercises. \"     If you do not have an account, please click on the \"Sign Up Now\" link. Current as of: March 2, 2020               Content Version: 12.6  © 8394-5994 [employer]. Care instructions adapted under license by [facility] ([facility]). If you have questions about a medical condition or this instruction, always ask your healthcare professional. [address] any warranty or liability for your use of this information.

## 2021-01-07 NOTE — PROGRESS NOTES
[facility]  [address] [place] [address]  Dept: [phone]  Dept Fax: [phone]        Left Elbow Follow Up      Subjective:     Chief Complaint   Patient presents with    Elbow Pain     Left      HPI:     Adina Price is a 26y.o. year old right hand dominant female that has had pain in the left Elbow for 9 days. As far as any trauma to the elbow, the patient indicates that she slipped on some ice on 12/29/2020 when she went to give her neighbor their holiday card and she states that the elbow hit the ground and the impact was mostly on the medial aspect of her elbow. The pain is the worse at night and when doing activities that involve lifting with the elbow. The pain restricts no activities but she states that the injury has made it a bit more difficult to type at her job or turn a door knob. The pain does not seem to improve with time. The following medications have been tried: Aleve QID. The patient has also tried icing the elbow as well and she states that it has been helping minimally. The patient has not had a corticosteroid injection into the elbow. The patient has not tried physical therapy. The patient has not has surgery. The opposite elbow is okay. Patient states that she has been able to work but the elbow just aches some days and that is what brought her in to be evaluated. ROS:   Review of Systems   Constitutional: Positive for activity change. Negative for appetite change, fatigue and fever. Respiratory: Negative for apnea, cough, chest tightness and shortness of breath. Cardiovascular: Negative for chest pain, palpitations and leg swelling. Gastrointestinal: Negative for abdominal distention, abdominal pain, constipation, diarrhea, nausea and vomiting. Genitourinary: Negative for difficulty urinating, dysuria and hematuria. Musculoskeletal: Positive for arthralgias and myalgias. Negative for gait problem and joint swelling. Skin: Negative for color change and rash. Neurological: Negative for dizziness, weakness, numbness and headaches. Psychiatric/Behavioral: Negative for sleep disturbance. Past Medical History:    Past Medical History:   Diagnosis Date    Allergic rhinitis     Dermatitis     Hypoglycemia      Past Surgical History:    Past Surgical History:   Procedure Laterality Date    WISDOM TOOTH EXTRACTION       Current Medications:   Current Outpatient Medications   Medication Sig Dispense Refill    citalopram (CELEXA) 20 MG tablet Take 1 tablet by mouth daily 90 tablet 3    Norgestim-Eth Estrad Triphasic 0.18/0.215/0.25 MG-35 MCG TABS TAKE 1 TABLET DAILY 84 tablet 4    loratadine (CLARITIN REDITABS) 10 MG dissolvable tablet Take 10 mg by mouth daily       No current facility-administered medications for this visit. Allergies:    Valium    Social History:   Social History     Socioeconomic History    Marital status:      Spouse name: None    Number of children: None    Years of education: None    Highest education level: None   Occupational History    None   Social Needs    Financial resource strain: None    Food insecurity     Worry: None     Inability: None    Transportation needs     Medical: None     Non-medical: None   Tobacco Use    Smoking status: Never Smoker    Smokeless tobacco: Never Used   Substance and Sexual Activity    Alcohol use:  Yes    Drug use: Never    Sexual activity: Yes     Partners: Male   Lifestyle    Physical activity     Days per week: None     Minutes per session: None    Stress: None   Relationships    Social connections     Talks on phone: None     Gets together: None     Attends Confucianism service: None     Active member of club or organization: None     Attends meetings of clubs or organizations: None     Relationship status: None EXAMINATION: THREE XRAY VIEWS OF THE LEFT ELBOW 12/29/2020 12:15 pm COMPARISON: None. HISTORY: ORDERING SYSTEM PROVIDED HISTORY: Left elbow pain TECHNOLOGIST PROVIDED HISTORY: pain Reason for Exam: left lateral elbow pain Acuity: Acute Type of Exam: Initial Mechanism of Injury: fall today Relevant Medical/Surgical History: pt denies FINDINGS: There is suggestion of presence of elbow effusion with displacement of anterior and posterior fat pads about the distal humerus. Finding likely related to hemarthrosis. There is very minimal degenerative spurring about the elbow. No definite acute fracture is identified. 1. No definite evidence of acute fracture. Presence of elbow effusion raises possibility of presence of underlying occult fracture. Short-term follow-up examination would be helpful for re-evaluation. 2. Left elbow effusion. Finding likely represents hemarthrosis in this patient with clinical history of trauma. ----------------------------------------------------------------------------------------------------------------------  Xr Radius Ulna Left (2 Views)    Addendum Date: 1/3/2021    ADDENDUM: There is an error on the initial transcription of the findings of this report. The first line should read as follows: Displacement of fat pads about the distal HUMERUS suggest presence of elbow effusion/hemarthrosis as described on the report of the left elbow.      Result Date: 1/3/2021 EXAMINATION: TWO XRAY VIEWS OF THE LEFT FOREARM 12/29/2020 12:15 pm COMPARISON: Plain film examination of the left elbow performed at the same sitting HISTORY: ORDERING SYSTEM PROVIDED HISTORY: Pain in left forearm TECHNOLOGIST PROVIDED HISTORY: s/p fall, upper forearm pain Reason for Exam: left lateral forearm pain Acuity: Acute Type of Exam: Initial Mechanism of Injury: fall today Relevant Medical/Surgical History: pt denies FINDINGS: Displacement of fat pads about the distal Uri's suggest presence of elbow effusion/hemarthrosis as described on the report of the left elbow. No other significant bone or joint abnormality is identified. No evidence of acute fracture or dislocation. No evidence of acute fracture. Follow-up examination recommended in 7-10 days if clinically indicated. ----------------------------------------------------------------------------------------------------------------------  ELBOW X-RAY    Three views of the left elbow were obtained today. This includes AP, Oblique and lateral x-rays. The x-rays of the elbow reveal negative radiographs. There are no acute fractures, dislocations or soft tissue or bony abnormalities present. Resolution of posterior fat pad sign compared to 12/29/2020. Small anterior fat pad. Impression: Negative x-rays of the left elbow.      Plan: Treatment : I reviewed the X-ray with the patient and I informed them that the elbow has a sail sign and a fat pad sign but there does not seem to be an evident fracture. We discussed the etiologies and natural histories of contusion with unlikely occult fracture of the left elbow. We discussed the various treatment alternatives including anti-inflammatory medications, physical therapy, injections, further imaging studies and as a last result surgery. During today's visit, I explained to the patient that I do not feel she needs a sling nor a cast at this time. However, I suggested that she takes tylenol for the pain with one aleve but not four aleve within a eight hour time frame. I also told her that she should work on ROM over the next few weeks because the elbow was very stiff today and if she does not get it moving, she will lose more ROM and we do not want that to happen. I also explained to her that I am not going to change anything but if she does not get better after 4-6 weeks, I instructed her to call the office and we will reevaluate her pain and come up with a new plan. The patient then stated that she understands the plan and at this time, she has opted for elbow exercises and she will takes NSAID's at a prescription dose to help diffuse her inflammation and pain. Patient should return to the clinic PRN. The patient will call the office immediately with any problems. No orders of the defined types were placed in this encounter. No orders of the defined types were placed in this encounter.     Ariane Doran V, am scribing for and in the presence of Laney Farley D.OMaximo 1/7/2021  7:20 AM Michael Hawthorne DO, have personally seen this patient and I have reviewed the CC, PMH, FHX and Social History as provided by other clinical staff. I reassessed the HPI and ROS as scribed by Aubrey Mendoza Medical Scribe in my presence and it is both accurate and complete. Thereafter, I personally performed the PE, reviewed the imaging and established the DX and POC. I agree with the documentation provided by the Medical Scribe. I have reviewed all documentation in its entirety prior to providing my signature indicating agreement. Any areas of disagreement are noted on the chart.     Electronically signed by Precious Richardson DO on 1/10/2021 at 6:10 PM          Electronically signed by stanley Martinez 1/7/2021 at 7:20 AM

## 2021-02-10 DIAGNOSIS — Z01.419 WELL FEMALE EXAM WITH ROUTINE GYNECOLOGICAL EXAM: ICD-10-CM

## 2021-02-10 DIAGNOSIS — Z30.09 BIRTH CONTROL COUNSELING: ICD-10-CM

## 2021-02-10 RX ORDER — NORGESTIMATE AND ETHINYL ESTRADIOL 7DAYSX3 28
1 KIT ORAL DAILY
Qty: 84 TABLET | Refills: 1 | Status: SHIPPED | OUTPATIENT
Start: 2021-02-10 | End: 2021-07-30

## 2021-02-10 NOTE — TELEPHONE ENCOUNTER
LOV 8-6-20  [facility] [id number]    Health Maintenance   Topic Date Due    Hepatitis C screen  1990    Varicella vaccine (1 of 2 - 2-dose childhood series) 11/23/1989    Flu vaccine (1) 09/01/2020    Cervical cancer screen  08/06/2025    DTaP/Tdap/Td vaccine (2 - Td) 03/02/2028    Hepatitis A vaccine  Aged Out    Hepatitis B vaccine  Aged Out    Hib vaccine  Aged Out    Meningococcal (ACWY) vaccine  Aged Out    Pneumococcal 0-64 years Vaccine  Aged Out    HIV screen  Discontinued             (applicable per patient's age: Cancer Screenings, Depression Screening, Fall Risk Screening, Immunizations)    LDL Cholesterol (mg/dL)   Date Value   08/27/2020 66     AST (U/L)   Date Value   08/27/2020 23     ALT (U/L)   Date Value   08/27/2020 19     BUN (mg/dL)   Date Value   08/27/2020 13      (goal A1C is < 7)   (goal LDL is <100) need 30-50% reduction from baseline     BP Readings from Last 3 Encounters:   01/07/21 118/80   12/29/20 117/71   08/06/20 112/72    (goal /80)      All Future Testing planned in [facility]:  Lab Frequency Next Occurrence   PAP Smear Once 08/06/2020       Next Visit Date:  No future appointments.          Patient Active Problem List:     Generalized anxiety disorder     Adult night terror     Panic attacks     Current mild episode of major depressive disorder without prior episode ([facility] [address]

## 2021-07-06 ENCOUNTER — TELEPHONE (OUTPATIENT)
Dept: FAMILY MEDICINE CLINIC | Age: 31
End: 2021-07-06

## 2021-07-06 NOTE — TELEPHONE ENCOUNTER
----- Message from Theresa Kelloggeker sent at 7/6/2021 11:25 AM EDT -----  Subject: Appointment Request    Reason for Call: Urgent Back Neck Pain    QUESTIONS  Type of Appointment? Established Patient  Reason for appointment request? Requested Provider unavailable - Dr Karmen Schumacher for Provider? Patient would like a f/u for back   pain that shoots into her legs. She is also having some tingling in her   legs in the morning upon awakening. Patient would like to see Dr Huma Hilton   only since she has been the one following her for this issue. Please call   patient back and advise Thanks.  ---------------------------------------------------------------------------  --------------  CALL BACK INFO  What is the best way for the office to contact you? OK to leave message on   AddressReportil, OK to respond with electronic message via [employer] portal (only   for patients who have registered [employer] account)  Preferred Call Back Phone Number? [phone]  ---------------------------------------------------------------------------  --------------  SCRIPT ANSWERS  Relationship to Patient? Self  Appointment reason? Symptomatic  Select script based on patient symptoms? Adult Back or Neck Pain [Slipped   disc, Herniated disc, sciatica]  Did you have an injury or trauma within the past 3 days? No  Are you having new problems with your bowel or bladder control? No  Are you having new onset numbness, tingling, or weakness in your arms   and/or legs with this pain? Yes  Have you been diagnosed with, awaiting test results for, or told that you   are suspected of having COVID-19 (Coronavirus)? (If patient has tested   negative or was tested as a requirement for work, school, or travel and   not based on symptoms, answer no)? No  Do you currently have flu-like symptoms including fever or chills, cough,   shortness of breath, difficulty breathing, or new loss of taste or smell?    No  Have you had close contact with someone with COVID-19

## 2021-07-06 NOTE — TELEPHONE ENCOUNTER
in the last 14 days? No  (Service Expert  click yes below to proceed with [employer] As Usual   Scheduling)?  Yes

## 2021-07-06 NOTE — TELEPHONE ENCOUNTER
----- Message from Izzy List sent at 7/6/2021 11:25 AM EDT -----  Subject: Appointment Request    Reason for Call: Urgent Back Neck Pain    QUESTIONS  Type of Appointment? Established Patient  Reason for appointment request? Requested Provider unavailable - Dr Fawad Deleon for Provider? Patient would like a f/u for back   pain that shoots into her legs. She is also having some tingling in her   legs in the morning upon awakening. Patient would like to see Dr Flor Matthews   only since she has been the one following her for this issue. Please call   patient back and advise Thanks.  ---------------------------------------------------------------------------  --------------  CALL BACK INFO  What is the best way for the office to contact you? OK to leave message on   [employer], OK to respond with electronic message via [employer] portal (only   for patients who have registered own[employer] account)  Preferred Call Back Phone Number? [phone]  ---------------------------------------------------------------------------  --------------  SCRIPT ANSWERS  Relationship to Patient? Self  Appointment reason? Symptomatic  Select script based on patient symptoms? Adult Back or Neck Pain [Slipped   disc, Herniated disc, sciatica]  Did you have an injury or trauma within the past 3 days? No  Are you having new problems with your bowel or bladder control? No  Are you having new onset numbness, tingling, or weakness in your arms   and/or legs with this pain? Yes  Have you been diagnosed with, awaiting test results for, or told that you   are suspected of having COVID-19 (Coronavirus)? (If patient has tested   negative or was tested as a requirement for work, school, or travel and   not based on symptoms, answer no)? No  Do you currently have flu-like symptoms including fever or chills, cough,   shortness of breath, difficulty breathing, or new loss of taste or smell?    No  Have you had close contact with someone with COVID-19 in the last 14 days? No  (Service Expert  click yes below to proceed with [place] As Usual   Scheduling)?  Yes

## 2021-07-07 ENCOUNTER — HOSPITAL ENCOUNTER (OUTPATIENT)
Age: 31
Setting detail: SPECIMEN
Discharge: HOME | End: 2021-07-07
Payer: COMMERCIAL

## 2021-07-07 ENCOUNTER — OFFICE VISIT (OUTPATIENT)
Dept: FAMILY MEDICINE CLINIC | Age: 31
End: 2021-07-07
Payer: COMMERCIAL

## 2021-07-07 VITALS
HEART RATE: 80 BPM | OXYGEN SATURATION: 98 % | WEIGHT: 222 LBS | BODY MASS INDEX: 37.9 KG/M2 | SYSTOLIC BLOOD PRESSURE: 121 MMHG | RESPIRATION RATE: 12 BRPM | TEMPERATURE: 97.4 F | HEIGHT: 64 IN | DIASTOLIC BLOOD PRESSURE: 84 MMHG

## 2021-07-07 DIAGNOSIS — M51.27 HERNIATED NUCLEUS PULPOSUS, L5-S1: Primary | ICD-10-CM

## 2021-07-07 DIAGNOSIS — K59.1 FUNCTIONAL DIARRHEA: ICD-10-CM

## 2021-07-07 DIAGNOSIS — M51.27 HERNIATED NUCLEUS PULPOSUS, L5-S1: ICD-10-CM

## 2021-07-07 LAB
ABSOLUTE EOS #: 0.09 K/UL (ref 0–0.44)
ABSOLUTE IMMATURE GRANULOCYTE: <0.03 K/UL (ref 0–0.3)
ABSOLUTE LYMPH #: 2.25 K/UL (ref 1.1–3.7)
ABSOLUTE MONO #: 0.58 K/UL (ref 0.1–1.2)
BASOPHILS # BLD: 1 % (ref 0–2)
BASOPHILS ABSOLUTE: 0.06 K/UL (ref 0–0.2)
DIFFERENTIAL TYPE: ABNORMAL
EOSINOPHILS RELATIVE PERCENT: 1 % (ref 1–4)
HCT VFR BLD CALC: 46 % (ref 36.3–47.1)
HEMOGLOBIN: 13.3 G/DL (ref 11.9–15.1)
IMMATURE GRANULOCYTES: 0 %
LYMPHOCYTES # BLD: 28 % (ref 24–43)
MCH RBC QN AUTO: 30.3 PG (ref 25.2–33.5)
MCHC RBC AUTO-ENTMCNC: 28.9 G/DL (ref 28.4–34.8)
MCV RBC AUTO: 104.8 FL (ref 82.6–102.9)
MONOCYTES # BLD: 7 % (ref 3–12)
NRBC AUTOMATED: 0 PER 100 WBC
PDW BLD-RTO: 12.7 % (ref 11.8–14.4)
PLATELET # BLD: 337 K/UL (ref 138–453)
PLATELET ESTIMATE: ABNORMAL
PMV BLD AUTO: 11.8 FL (ref 8.1–13.5)
RBC # BLD: 4.39 M/UL (ref 3.95–5.11)
RBC # BLD: ABNORMAL 10*6/UL
SEG NEUTROPHILS: 63 % (ref 36–65)
SEGMENTED NEUTROPHILS ABSOLUTE COUNT: 5.18 K/UL (ref 1.5–8.1)
WBC # BLD: 8.2 K/UL (ref 3.5–11.3)
WBC # BLD: ABNORMAL 10*3/UL

## 2021-07-07 PROCEDURE — 99214 OFFICE O/P EST MOD 30 MIN: CPT | Performed by: STUDENT IN AN ORGANIZED HEALTH CARE EDUCATION/TRAINING PROGRAM

## 2021-07-07 RX ORDER — LEVOFLOXACIN 750 MG/1
TABLET ORAL
COMMUNITY
Start: 2021-05-17 | End: 2021-07-07

## 2021-07-07 RX ORDER — METRONIDAZOLE 250 MG/1
TABLET ORAL
COMMUNITY
Start: 2021-05-17 | End: 2021-07-07

## 2021-07-07 SDOH — ECONOMIC STABILITY: FOOD INSECURITY: WITHIN THE PAST 12 MONTHS, YOU WORRIED THAT YOUR FOOD WOULD RUN OUT BEFORE YOU GOT MONEY TO BUY MORE.: NEVER TRUE

## 2021-07-07 SDOH — ECONOMIC STABILITY: FOOD INSECURITY: WITHIN THE PAST 12 MONTHS, THE FOOD YOU BOUGHT JUST DIDN'T LAST AND YOU DIDN'T HAVE MONEY TO GET MORE.: NEVER TRUE

## 2021-07-07 ASSESSMENT — PATIENT HEALTH QUESTIONNAIRE - PHQ9
SUM OF ALL RESPONSES TO PHQ QUESTIONS 1-9: 0
SUM OF ALL RESPONSES TO PHQ QUESTIONS 1-9: 0
1. LITTLE INTEREST OR PLEASURE IN DOING THINGS: 0
SUM OF ALL RESPONSES TO PHQ9 QUESTIONS 1 & 2: 0
SUM OF ALL RESPONSES TO PHQ QUESTIONS 1-9: 0
2. FEELING DOWN, DEPRESSED OR HOPELESS: 0

## 2021-07-07 ASSESSMENT — ENCOUNTER SYMPTOMS
CHEST TIGHTNESS: 0
BACK PAIN: 0
ABDOMINAL PAIN: 0
COUGH: 0
DIARRHEA: 0
WHEEZING: 0
SORE THROAT: 0
ABDOMINAL DISTENTION: 0
CONSTIPATION: 0
SHORTNESS OF BREATH: 0

## 2021-07-07 ASSESSMENT — SOCIAL DETERMINANTS OF HEALTH (SDOH): HOW HARD IS IT FOR YOU TO PAY FOR THE VERY BASICS LIKE FOOD, HOUSING, MEDICAL CARE, AND HEATING?: NOT HARD AT ALL

## 2021-07-08 LAB
ALBUMIN SERPL-MCNC: 3.5 G/DL (ref 3.5–5.2)
ALBUMIN/GLOBULIN RATIO: 0.8 (ref 1–2.5)
ALP BLD-CCNC: 58 U/L (ref 35–104)
ALT SERPL-CCNC: 34 U/L (ref 5–33)
ANION GAP SERPL CALCULATED.3IONS-SCNC: 15 MMOL/L (ref 9–17)
ANTI DNA DOUBLE STRANDED: 1.8 IU/ML
ANTI-NUCLEAR ANTIBODY (ANA): NEGATIVE
AST SERPL-CCNC: 47 U/L
BILIRUB SERPL-MCNC: 0.18 MG/DL (ref 0.3–1.2)
BUN BLDV-MCNC: 17 MG/DL (ref 6–20)
BUN/CREAT BLD: ABNORMAL (ref 9–20)
C-REACTIVE PROTEIN: 16.8 MG/L (ref 0–5)
CALCIUM SERPL-MCNC: 9.3 MG/DL (ref 8.6–10.4)
CHLORIDE BLD-SCNC: 102 MMOL/L (ref 98–107)
CO2: 17 MMOL/L (ref 20–31)
CREAT SERPL-MCNC: 0.98 MG/DL (ref 0.5–0.9)
ENA ANTIBODIES SCREEN: 0.1 U/ML
GFR AFRICAN AMERICAN: >60 ML/MIN
GFR NON-AFRICAN AMERICAN: >60 ML/MIN
GFR SERPL CREATININE-BSD FRML MDRD: ABNORMAL ML/MIN/{1.73_M2}
GFR SERPL CREATININE-BSD FRML MDRD: ABNORMAL ML/MIN/{1.73_M2}
GLIADIN DEAMINIDATED PEPTIDE AB IGA: 7 U/ML
GLIADIN DEAMINIDATED PEPTIDE AB IGG: 11 U/ML
GLUCOSE FASTING: 81 MG/DL (ref 70–99)
IGA: 356 MG/DL (ref 70–400)
POTASSIUM SERPL-SCNC: 4.8 MMOL/L (ref 3.7–5.3)
RHEUMATOID FACTOR: <10 IU/ML
SEDIMENTATION RATE, ERYTHROCYTE: <1 MM (ref 0–20)
SODIUM BLD-SCNC: 134 MMOL/L (ref 135–144)
TISSUE TRANSGLUTAMINASE IGA: 0.6 U/ML
TOTAL PROTEIN: 7.9 G/DL (ref 6.4–8.3)

## 2021-07-13 DIAGNOSIS — F32.0 CURRENT MILD EPISODE OF MAJOR DEPRESSIVE DISORDER WITHOUT PRIOR EPISODE (HCC): ICD-10-CM

## 2021-07-13 DIAGNOSIS — F41.1 GENERALIZED ANXIETY DISORDER: Primary | ICD-10-CM

## 2021-07-13 RX ORDER — CITALOPRAM 20 MG/1
20 TABLET ORAL DAILY
Qty: 90 TABLET | Refills: 3 | Status: SHIPPED | OUTPATIENT
Start: 2021-07-13 | End: 2022-07-07

## 2021-07-13 NOTE — TELEPHONE ENCOUNTER
Last visit: 2/6/20  Last Med refill: 7/17/20  Next Visit Date:  Future Appointments   Date Time Provider Trey Santa   7/19/2021  8:00 AM Michell Morgan,  [facility]   7/22/2021  7:30 AM [facility] [place] [facility] [place]   8/9/2021  3:45 PM Estuardo Roger MD [facility]   Topic Date Due    Flu vaccine (1) 09/01/2021    Cervical cancer screen  08/06/2025    DTaP/Tdap/Td vaccine (2 - Td or Tdap) 03/02/2028    COVID-19 Vaccine  Completed    Hepatitis A vaccine  Aged Out    Hepatitis B vaccine  Aged Out    Hib vaccine  Aged Out    Meningococcal (ACWY) vaccine  Aged Out    Pneumococcal 0-64 years Vaccine  Aged Out    Varicella vaccine  Discontinued    Hepatitis C screen  Discontinued    HIV screen  Discontinued       No results found for: LABA1C          ( goal A1C is < 7)   No results found for: LABMICR  LDL Cholesterol (mg/dL)   Date Value   08/27/2020 66   04/14/2015 46       (goal LDL is <100)   AST (U/L)   Date Value   07/07/2021 47 (H)     ALT (U/L)   Date Value   07/07/2021 34 (H)     BUN (mg/dL)   Date Value   07/07/2021 17     BP Readings from Last 3 Encounters:   07/07/21 121/84   01/07/21 118/80   12/29/20 117/71          (goal 120/80)    All Future Testing planned in [facility]  Lab Frequency Next Occurrence   PAP Smear Once 08/06/2020   MRI LUMBAR SPINE WO CONTRAST Once 07/07/2021               Patient Active Problem List:     Generalized anxiety disorder     Adult night terror     Panic attacks     Current mild episode of major depressive disorder without prior episode ([facility] [address]

## 2021-07-22 ENCOUNTER — HOSPITAL ENCOUNTER (OUTPATIENT)
Age: 31
Discharge: HOME | End: 2021-07-22
Payer: COMMERCIAL

## 2021-07-22 ENCOUNTER — HOSPITAL ENCOUNTER (OUTPATIENT)
Dept: MRI IMAGING | Age: 31
Discharge: HOME | End: 2021-07-24
Payer: COMMERCIAL

## 2021-07-22 DIAGNOSIS — R63.5 WEIGHT GAIN: ICD-10-CM

## 2021-07-22 DIAGNOSIS — R76.8 POSITIVE AUTOANTIBODY SCREENING FOR CELIAC DISEASE: ICD-10-CM

## 2021-07-22 DIAGNOSIS — M51.27 HERNIATED NUCLEUS PULPOSUS, L5-S1: ICD-10-CM

## 2021-07-22 LAB
THYROXINE, FREE: 1.06 NG/DL (ref 0.93–1.7)
TSH SERPL DL<=0.05 MIU/L-ACNC: 1.61 MIU/L (ref 0.3–5)

## 2021-07-22 PROCEDURE — 84439 ASSAY OF FREE THYROXINE: CPT

## 2021-07-22 PROCEDURE — 84443 ASSAY THYROID STIM HORMONE: CPT

## 2021-07-22 PROCEDURE — 86376 MICROSOMAL ANTIBODY EACH: CPT

## 2021-07-22 PROCEDURE — 86800 THYROGLOBULIN ANTIBODY: CPT

## 2021-07-22 PROCEDURE — 36415 COLL VENOUS BLD VENIPUNCTURE: CPT

## 2021-07-22 PROCEDURE — 72148 MRI LUMBAR SPINE W/O DYE: CPT

## 2021-07-23 LAB
THYROGLOBULIN AB: <12 IU/ML (ref 0–40)
THYROID PEROXIDASE (TPO) AB: <4 IU/ML (ref 0–25)

## 2021-07-26 ENCOUNTER — PATIENT MESSAGE (OUTPATIENT)
Dept: FAMILY MEDICINE CLINIC | Age: 31
End: 2021-07-26

## 2021-07-26 DIAGNOSIS — M51.27 HERNIATED NUCLEUS PULPOSUS, L5-S1: Primary | ICD-10-CM

## 2021-07-26 DIAGNOSIS — M51.26 LUMBAR HERNIATED DISC: Primary | ICD-10-CM

## 2021-07-26 NOTE — TELEPHONE ENCOUNTER
From: Cory Fairchild  To: Kari Vicente MD  Sent: 7/26/2021 9:35 AM EDT  Subject: Test Results Kae Reese,  Yes, please send a recommendation for a neurosurgeon. Since the PT only helped up to a point last year, and I still do daily exercises, I don't think the herniated disc will fix itself. I'd like to have the opinion of a neurosurgeon, like you suggested. Thanks.

## 2021-07-30 DIAGNOSIS — Z30.09 BIRTH CONTROL COUNSELING: ICD-10-CM

## 2021-07-30 DIAGNOSIS — Z01.419 WELL FEMALE EXAM WITH ROUTINE GYNECOLOGICAL EXAM: ICD-10-CM

## 2021-07-30 RX ORDER — NORGESTIMATE AND ETHINYL ESTRADIOL 7DAYSX3 28
KIT ORAL
Qty: 84 TABLET | Refills: 3 | Status: SHIPPED | OUTPATIENT
Start: 2021-07-30 | End: 2022-06-30

## 2021-08-04 ENCOUNTER — HOSPITAL ENCOUNTER (OUTPATIENT)
Age: 31
Discharge: HOME | End: 2021-08-06
Payer: COMMERCIAL

## 2021-08-04 ENCOUNTER — HOSPITAL ENCOUNTER (OUTPATIENT)
Dept: GENERAL RADIOLOGY | Age: 31
Discharge: HOME | End: 2021-08-06
Payer: COMMERCIAL

## 2021-08-04 ENCOUNTER — OFFICE VISIT (OUTPATIENT)
Dept: NEUROSURGERY | Age: 31
End: 2021-08-04
Payer: COMMERCIAL

## 2021-08-04 VITALS
WEIGHT: 222 LBS | OXYGEN SATURATION: 98 % | HEART RATE: 79 BPM | HEIGHT: 64 IN | DIASTOLIC BLOOD PRESSURE: 70 MMHG | BODY MASS INDEX: 37.9 KG/M2 | RESPIRATION RATE: 12 BRPM | SYSTOLIC BLOOD PRESSURE: 115 MMHG

## 2021-08-04 DIAGNOSIS — M51.9 LUMBAR DISC DISEASE: ICD-10-CM

## 2021-08-04 DIAGNOSIS — M51.9 LUMBAR DISC DISEASE: Primary | ICD-10-CM

## 2021-08-04 PROCEDURE — 72100 X-RAY EXAM L-S SPINE 2/3 VWS: CPT

## 2021-08-04 PROCEDURE — 99205 OFFICE O/P NEW HI 60 MIN: CPT | Performed by: NEUROLOGICAL SURGERY

## 2021-08-04 NOTE — PROGRESS NOTES
[place]  [facility] [address] [facility] [address]  Dept: [phone]    Patient:  Gini Gaspar  YOB: 1990  Date: 8/4/21    The patient is a 26 y.o. female who presents today for consult of the following problems:     Chief Complaint   Patient presents with    New Patient     Lumbar herniated disc              HPI:     Gini Gaspar is a 26 y.o. female on whom neurosurgical consultation was requested by Michel Martinez MD for management of severe degenerative disc disease. Patient states that with any specific inciting event approximate 1 year and 2 months ago she started having severe amount of axial back pain rating it 8-10 out of 10 with bilateral radiation right side greater than left side approximate S1 dermatome distribution of the posterior aspect of the leg into the plantar surface of the foot with some numbness and tingling of the toes. Symptoms had improved initially which was physical therapy along with acupuncture. Currently she has significant waxing waning symptoms that are significant worse in the morning when she first arises. She states that the symptoms are worse when she is in anyone specific position for prolonged period time without continuous mobilization. This includes when she first awakens after having laid in bed or when she is sitting for long period at work. .  She mitigates this by continuous changes in position in the morning when she gets up she does do exercises more to be able to roll out of bed and mobilize and then also at work she gets up frequently to change positions and ambulate in order to relieve her symptoms. Does use ice intermittently and has been on up Naprosyn as a recent with some medication the pain. She does state that she gets numbness and tingling that radiates down the lower extremities with change in position.   Denies any saddle anesthesia bowel or bladder incontinence. Does not have any chronic history of axial symptoms prior to this onset of symptoms approximate 1 year and 2 months ago. Is currently following with a chiropractor as well. Buddy Trejo History:     Past Medical History:   Diagnosis Date    Allergic rhinitis     Dermatitis     Hypoglycemia      Past Surgical History:   Procedure Laterality Date    WISDOM TOOTH EXTRACTION       No family history on file. Current Outpatient Medications on File Prior to Visit   Medication Sig Dispense Refill    Norgestim-Eth Estrad Triphasic 0.18/0.215/0.25 MG-35 MCG TABS TAKE 1 TABLET DAILY 84 tablet 3    citalopram (CELEXA) 20 MG tablet Take 1 tablet by mouth daily 90 tablet 3    loratadine (CLARITIN REDITABS) 10 MG dissolvable tablet Take 10 mg by mouth daily       No current facility-administered medications on file prior to visit. Social History     Tobacco Use    Smoking status: Never Smoker    Smokeless tobacco: Never Used   Vaping Use    Vaping Use: Never used   Substance Use Topics    Alcohol use: Yes    Drug use: Never       Allergies   Allergen Reactions    Valium Other (See Comments)     Hallucinations         Review of Systems  Constitutional: Negative for activity change and appetite change. HENT: Negative for ear pain and facial swelling. Eyes: Negative for discharge and itching. Respiratory: Negative for choking and chest tightness. Cardiovascular: Negative for chest pain and leg swelling. Gastrointestinal: Negative for nausea and abdominal pain. Endocrine: Negative for cold intolerance and heat intolerance. Genitourinary: Negative for frequency and flank pain. Musculoskeletal: Negative for myalgias and joint swelling. Skin: Negative for rash and wound. Allergic/Immunologic: Negative for environmental allergies and food allergies. Hematological: Negative for adenopathy. Does not bruise/bleed easily. Psychiatric/Behavioral: Negative for self-injury.  The patient is not nervous/anxious. Physical Exam:      /70   Pulse 79   Resp 12   Ht 5' 4\" (1.626 m)   Wt 222 lb (100.7 kg)   SpO2 98%   BMI 38.11 kg/m²   Estimated body mass index is 38.11 kg/m² as calculated from the following:    Height as of this encounter: 5' 4\" (1.626 m). Weight as of this encounter: 222 lb (100.7 kg). General:  Rodriguez Shipley is a 26y.o. year old female who appears her stated age. HEENT: Normocephalic atraumatic. Neck supple. Chest: regular rate; pulses equal  Abdomen: Soft nontender nondistended. Normoactive bowel sounds. Ext: DP and PT pulses 2+, good cap refill  Neuro    Mentation  Appropriate affect  Registration intact  Orientation intact  3 item recall intact  Judgement intact to situation    Cranial Nerves:   Pupils equal and reactive to light  Extraocular motion intact  Face and shrug symmetric  Tongue midline  No dysarthria  v1-3 sensation symmetric, masseter tone symmetric  Hearing symmetric and intact to finger rub    Sensation:   Intact    Motor  L deltoid 5/5; R deltoid 5/5  L biceps 5/5; R biceps 5/5  L triceps 5/5; R triceps 5/5  L wrist extension 5/5; R wrist extension 5/5  L intrinsics 5/5; R intrinsics 5/5     L iliopsoas 5/5 , R iliopsoas 5/5  L quadriceps 5/5; R quadriceps 5/5  L Dorsiflexion 5/5; R dorsiflexion 5/5  L Plantarflexion 5/5; R plantarflexion 5/5  L EHL 5/5; R EHL 5/5    Reflexes  L Brachioradialis 2+/4; R brachioradialis 2+/4  L Biceps 2+/4; R Biceps 2+/4  L Triceps 2+/4; R Triceps 2+/4  L Patellar 2+/4: R Patellar 2+/4  L Achilles 2+/4; R Achilles 2+/4    hoffmans L: neg  hoffmans R: neg  Clonus L: neg  Clonus R: neg  Babinski L: up  Babinski R; up    Negative Jeet Fare. Negative straight leg raise. Negative tenderness over the greater trochanters or the SI joints. Forward flexion test with significant provocation of symptoms versus extension. Able to flex to approximately 10 degrees.   Extension to approximately 30 degrees from the vertical    Studies Review:     MRI lumbar spine does show degenerative disc disease with desiccation loss of some height as well as protrusion that is central and left paracentral at L5-S1 only. No significant central or foraminal stenosis present. Assessment and Plan:      1. Lumbar disc disease          Plan:     Significant degenerative disc disease that is ongoing for greater than 1 year. The patient has been fairly debilitated from his pain and had to alter multiple hobbies as well as her work routines as well as has a debilitating. Upon awakening in the morning require specific measures for her to mobilize and get out of bed. At this point she has been through physical therapy for. Months and is continuing measures and states that they did help her significantly. Extensive discussion was had and review of the patient's MRI as well as her overall symptoms and provocative measures. At this point considering her fairly young age and previous success even within the span of the last year with physical therapy I would strongly recommend a concerted multimodal conservative approach and target in order to treat her pain without surgery. In the event that she is refractory I did discuss with her that there are surgical options that we can leave the discussion for if it comes to that point. We will obtain extension x-rays to rule out any gross instability, pain management referral for L4-5 and L5-S1 facet blocks as a diagnostic and therapeutic maneuver, rereferral to physical therapy to work on core strengthening stretches and postural training, and I have counseled the patient on weight loss as a medicated measure for conservative treatment of back pain as well as preparatory in the event that she does need surgery. Followup: No follow-ups on file. Prescriptions Ordered:  No orders of the defined types were placed in this encounter.        Orders Placed:  Orders Placed This Encounter   Procedures  XR LUMBAR SPINE FLEXION AND EXTENSION ONLY     Standing lateral Flexion, Neutral, extension  Include both femoral heads on neutral imaging     Standing Status:   Future     Standing Expiration Date:   8/4/2022     Order Specific Question:   Reason for exam:     Answer:   evaluate for instability    Ambulatory referral to Physical Therapy     Referral Priority:   Routine     Referral Type:   Eval and Treat     Referral Reason:   Specialty Services Required     Number of Visits Requested:   1    Ambulatory referral to Pain Clinic     Referral Priority:   Routine     Referral Type:   Eval and Treat     Referral Reason:   Specialty Services Required     Number of Visits Requested:   1        Electronically signed by Ana Simpson DO on 8/4/2021 at 10:11 AM    Please note that this chart was generated using voice recognition Dragon dictation software. Although every effort was made to ensure the accuracy of this automated transcription, some errors in transcription may have occurred.

## 2021-08-11 ENCOUNTER — HOSPITAL ENCOUNTER (OUTPATIENT)
Dept: PHYSICAL THERAPY | Facility: CLINIC | Age: 31
Setting detail: THERAPIES SERIES
Discharge: HOME | End: 2021-08-11
Payer: COMMERCIAL

## 2021-08-11 PROCEDURE — 97110 THERAPEUTIC EXERCISES: CPT

## 2021-08-11 PROCEDURE — 97161 PT EVAL LOW COMPLEX 20 MIN: CPT

## 2021-08-11 NOTE — CONSULTS
[] [address]  [address]  [phone]  F: [phone] [] [address]  [facility]   [address]  P: [phone]  F: [phone] [x] [place]  [address]  P: [phone]  F: [phone] [] [address]  P: [phone]  F: [phone] [] [address]  [facility]   [address]   [place]: [phone]  F: [phone]      Physical Therapy Spine Evaluation    Date:  2021  Patient: Mercy Ray  : 1990  MRN: [id number]  Physician: Sophie Russell MD  Insurance: [employer]  Auth after   Medical Diagnosis: M51.27 (ICD-10-CM) - Herniated nucleus pulposus, L5-S1    Rehab Codes: M54.5  Onset Date: 2020  Next 's appt.: 21    Subjective:   CC:Pain in AM when trying to get out of bed with back pain and N&T into feet. AM pain reduced with hip release, clam shells, side leg lift series and sacral massage. Pain increases with prolong position, intermittent Twisting. Pain described as aching and stabbing.    Sleep is difficulty due to pain sleeps on side and supine with pillows  3/10 at moment 10/10 at worst may need assistance getting out of bed   Attends Exercise  2x/wk   Sees Chiropractor every 2 wks pain back returns within 2 hrs  Has hypervolt at home that she uses      HPI: (onset date) L4-L5,L5-S1 recent dx of celiac disease    PMHx: [x] Unremarkable [] Diabetes [] HTN  [] Pacemaker   [] MI/Heart Problems [] Cancer [] Arthritis [x] Other: celiac               [x] Refer to full medical chart  In EPIC     Tests: [] X-Ray: [x] MRI:  Disc bulge with concomitant left paracentral disc extrusion at L5-S1   demonstrating inferior migration. Vivi Easley is posterior displacement of the   bilateral S1 nerve roots and overall mild spinal canal stenosis. 2. Additional multilevel degenerative changes, as described above. Medications: [x] Refer to full medical record [] None [] Other:  Allergies:      [x] Refer to full medical record [] None [] Other:    Objective:      STRENGTH  STRENGTH  ROM    Left Right  Left Right Cervical    C5 Shld Abd   L1-2 Hip Flex 4+ 4+ Flexion    Shld Flexion   Hip Abd -3 -3 Extension    Shld IR   L3-4 Knee Ext 5 5 Rotation L R   Shld ER   L4 Ankle DF 5 5 Sidebend L R   C6 Elb Flex   L5 EHL 5 5 Retraction    C7 Elb Ext   S1 Plant. Flex   Lumbar    C8 EPL   Abdominals   Flexion    T1 Fing Abd   Erector Spinae   Extension          Rotation L  R         Sidebend L R         UE/LE                  TESTS (+/-) LEFT RIGHT Not Tested   SLR [] sit [x] supine - - []   Hamstring (SLR)   []   SKTC   []   DKTC   []   Slump/Dural   []   SI JT   []   JENNIFER   []   Joint Mobility   []   Cerv. Comp   []   Cerv. Distraction   []   Cerv.  Alar/Transverse   []   Vertebral Artery   []   Adsons   []   Mancel Mac   []     OBSERVATION No Deficit Deficit Not Tested Comments   Posture       Forward Head [] [x] []    Rounded Shoulders [] [x] []    Kyphosis [] [] []    Lordosis [] [] []    Lateral Shift [] [] []    Scoliosis [] [] []    Iliac Crest [] [] []    PSIS [] [] []    ASIS [] [] []    Genu Valgus [] [] []    Genu Varus [] [] []    Genu Recurvatum [] [] []    Pronation [] [] []    Supination [] [] []    Leg Length Discrp [] [] []    Slumped Sitting [] [x] []    Palpation [] [x] [] Bilateral SIJ and paraspinals L4-L5, L5-S1   Sensation [] [] []    Edema [] [] []    Neurological [] [] []      Sitting : leans forward  Limited Rotation: patient ext and SB bilateral L>R to accomplish movement  SL stance: less motion w/ R leg lifting    Functional Test: Modified Oswestery Score: 24% functionally impaired     Comments:Patient presents demonstrating hypermobility in lower lumbar, limited thoracic rotation, limited SIJ movement and lower crossed syndrome  Assessment:  Patient would benefit from skilled physical therapy services in order to: improve movement patterns and reduce pain    Problems:    [x] ? Pain:  [x] ? ROM:  [x] ? Strength:  [x] ? Function:  [] Other:      STG: (to be met in 6 treatments)  1. ? Pain: Patient to report the ability to transfer out of bed with 310 pain and with IND  2. ? ROM:  3. ? Strength:  4. ? Function:  5. Patient to be independent with home exercise program as demonstrated by performance with correct form without cues. 6. Demonstrate Knowledge of fall prevention  LTG: (to be met in 12 treatments)  1. Patient to report 0/10 pain with prolong sitting turning and getting out of bed  2. Patient to demonstrate the ability to maintain neutral alignment and mobility when completing transfers  3. Patient to report 0% deficit on Modified Oswestry       Patient goals: To have less pain    Rehab Potential:  [x] Good  [] Fair  [] Poor   Suggested Professional Referral:  [x] No  [] Yes:  Barriers to Goal Achievement:  [x] No  [] Yes:  Domestic Concerns:  [x] No  [] Yes:    Pt. Education:  [x] Plans/Goals, Risks/Benefits discussed  [x] Home exercise program  Method of Education: [] Verbal  [] Demo  [] Written  Comprehension of Education:  [] Verbalizes understanding. [] Demonstrates understanding. [] Needs Review. [] Demonstrates/verbalizes understanding of HEP/Ed previously given.     Treatment Plan:  [x] Therapeutic Exercise   44159  [] Iontophoresis: 4 mg/mL Dexamethasone Sodium Phosphate  mAmin  99949   [] Therapeutic Activity  45901 [] Vasopneumatic cold with compression  76680    [] Gait Training   73569 [] Ultrasound   50633   [x] Neuromuscular Re-education  50052 [] Electrical Stimulation Unattended  33385   [x] Manual Therapy  33936 [] Electrical Stimulation Attended  41367   [x] Instruction in HEP  [] Lumbar/Cervical Traction  24768 Frequency: 1-2 x/week for 12 visits    Todays Treatment:  Modalities:   Precautions:  Exercises:  Exercise Reps/ Time Weight/ Level Comments   TVA activation w/ band       Leg pull prep                        Other:    Specific Instructions for next treatment: side lying spine rotation(thread the needle), 4pt abduction, progress TVA strength with LE lift, quad and hip flexor STR, gastroc stretch      Evaluation Complexity:  History (Personal factors, comorbidities) [x] 0 [] 1-2 [] 3+   Exam (limitations, restrictions) [x] 1-2 [] 3 [] 4+   Clinical presentation (progression) [x] Stable [] Evolving  [] Unstable   Decision Making [x] Low [] Moderate [] High    [x] Low Complexity [] Moderate Complexity [] High Complexity       Treatment Charges: Mins Units   [x] Evaluation       [x]  Low       []  Moderate       []  High 35 1   []  Modalities     [x]  Ther Exercise 10 1   []  Manual Therapy     []  Ther Activities     []  Aquatics     []  Vasocompression     []  Other       TOTAL TREATMENT TIME: 45    Time in: 8am      Time out: 9am    Electronically signed by: Natasha Darden PT        Physician Signature:________________________________Date:__________________  By signing above or cosigning this note, I have reviewed this plan of care and certify a need for medically necessary rehabilitation services.      *PLEASE SIGN ABOVE AND FAX BACK ALL PAGES*

## 2021-08-18 ENCOUNTER — HOSPITAL ENCOUNTER (OUTPATIENT)
Dept: PHYSICAL THERAPY | Facility: CLINIC | Age: 31
Setting detail: THERAPIES SERIES
Discharge: HOME | End: 2021-08-18
Payer: COMMERCIAL

## 2021-08-18 PROCEDURE — 97110 THERAPEUTIC EXERCISES: CPT

## 2021-08-18 NOTE — FLOWSHEET NOTE
[] [address]  [address]  [phone]  F: [phone] [] [address]  [facility]   [address]  P: [phone]  F: [phone] [x] [address]  The[facility]  [address] [facility]  P: [phone]  F: [phone] [] [address]  P: [phone]  F: [phone] [] [address]  [facility]   [address]   [place]: [phone]  F: [phone]      Physical Therapy Daily Treatment Note    Date:  2021  Patient Name:  Jeffrey Carranza    :  1990  MRN: [id number]   MRN: [id number]  Physician: Linsey Andrews MD               Insurance: [employer] [facility]  Auth after   Medical Diagnosis: M51.27 (ICD-10-CM) - Herniated nucleus pulposus, L5-S1                  Rehab Codes: M54.5  Onset Date: 2020             Next 's appt.: 21  Visit# / total visits: 2/12     Cancels/No Shows: 0/0    Subjective:    Pain:  [] Yes  [x] No  Pain Rating: (0-10 scale) 0/10  Pain altered Tx:  [] No  [] Yes  Action:  Comments: Patient reports being able to do more at her exercise session yesterday    Objective:  Modalities:   Precautions:  Exercises:  Exercise Reps/ Time Weight/ Level Comments   lep pull front pres      Supine core pull down      Prone breathing drill      Side lying thoracic rotation      Thread the needle      Foam roller thoracic ROM      Pt. edu   Pillows for sleeping position   Side lying seg rotation                  Other:    Treatment Charges: Mins Units   []  Modalities     [x]  Ther Exercise 50 3   []  Manual Therapy     []  Ther Activities     []  Aquatics     []  Vasocompression     []  Other     Total Treatment time 50 3       Assessment: [] Progressing toward goals. [] No change. [x] Other: Reviewed HEP. Focused treatment today on improving thoracic ROM. Tactile and VC's used to correct alignment and eliminate lumbar compromise      STG: (to be met in 6 treatments)  1. ? Pain: Patient to report the ability to transfer out of bed with 310 pain and with IND  2. ? ROM:  3. ? Strength:  4. ? Function:  5. Patient to be independent with home exercise program as demonstrated by performance with correct form without cues. 6. Demonstrate Knowledge of fall prevention  LTG: (to be met in 12 treatments)  1. Patient to report 0/10 pain with prolong sitting turning and getting out of bed  2. Patient to demonstrate the ability to maintain neutral alignment and mobility when completing transfers  3. Patient to report 0% deficit on Modified Oswestry         Patient goals: To have less pain       Pt. Education:  [x] Yes  [] No  [] Reviewed Prior HEP/Ed  Method of Education: [] Verbal  [] Demo  [] Written  Comprehension of Education:  [] Verbalizes understanding. [] Demonstrates understanding. [] Needs review. [] Demonstrates/verbalizes HEP/Ed previously given. Plan: [x] Continue current frequency toward long and short term goals.           Time In:8am            Time Out: 9am    Electronically signed by:  Marian Lino PT

## 2021-08-23 ENCOUNTER — INITIAL CONSULT (OUTPATIENT)
Dept: PAIN MANAGEMENT | Age: 31
End: 2021-08-23
Payer: COMMERCIAL

## 2021-08-23 VITALS — WEIGHT: 223 LBS | HEIGHT: 64 IN | BODY MASS INDEX: 38.07 KG/M2

## 2021-08-23 DIAGNOSIS — M47.817 LUMBOSACRAL SPONDYLOSIS WITHOUT MYELOPATHY: Primary | ICD-10-CM

## 2021-08-23 DIAGNOSIS — G89.29 CHRONIC BILATERAL LOW BACK PAIN, UNSPECIFIED WHETHER SCIATICA PRESENT: ICD-10-CM

## 2021-08-23 DIAGNOSIS — M54.50 CHRONIC BILATERAL LOW BACK PAIN, UNSPECIFIED WHETHER SCIATICA PRESENT: ICD-10-CM

## 2021-08-23 PROCEDURE — 99204 OFFICE O/P NEW MOD 45 MIN: CPT | Performed by: PAIN MEDICINE

## 2021-08-23 ASSESSMENT — ENCOUNTER SYMPTOMS
ABDOMINAL PAIN: 0
BACK PAIN: 1
BOWEL INCONTINENCE: 0

## 2021-08-23 NOTE — PROGRESS NOTES
HPI:     Back Pain  This is a chronic problem. The current episode started more than 1 year ago. The problem occurs constantly. The problem has been gradually worsening since onset. The pain is present in the lumbar spine. The quality of the pain is described as aching and shooting. The pain radiates to the left thigh and right thigh. The pain is at a severity of 4/10. The pain is mild. The pain is worse during the day. Pertinent negatives include no abdominal pain, bladder incontinence or bowel incontinence. She has tried chiropractic manipulation and home exercises (physical therapy) for the symptoms. The treatment provided moderate relief. Chronic low back pain. MRI with degenerative changes at L4-5 and L5-S1 with some disc bulging at L5-S1. She has seen the surgical team, note reviewed. Nothing surgical planned at this time. Suggested MBB. Has been doing physical therapy. Patient denies any new neurological symptoms. No bowel or bladder incontinence, no weakness, and no falling. Review of OARRS does not show any aberrant prescription behavior. Past Medical History:   Diagnosis Date    Allergic rhinitis     Dermatitis     Hypoglycemia        Past Surgical History:   Procedure Laterality Date    WISDOM TOOTH EXTRACTION         Allergies   Allergen Reactions    Valium Other (See Comments)     Hallucinations           Current Outpatient Medications:     Norgestim-Eth Estrad Triphasic 0.18/0.215/0.25 MG-35 MCG TABS, TAKE 1 TABLET DAILY, Disp: 84 tablet, Rfl: 3    citalopram (CELEXA) 20 MG tablet, Take 1 tablet by mouth daily, Disp: 90 tablet, Rfl: 3    loratadine (CLARITIN REDITABS) 10 MG dissolvable tablet, Take 10 mg by mouth daily, Disp: , Rfl:     No family history on file.     Social History     Socioeconomic History    Marital status:      Spouse name: Not on file    Number of children: Not on file    Years of education: Not on file    Highest education level: Not on file Occupational History    Not on file   Tobacco Use    Smoking status: Never Smoker    Smokeless tobacco: Never Used   Vaping Use    Vaping Use: Never used   Substance and Sexual Activity    Alcohol use: Yes    Drug use: Never    Sexual activity: Yes     Partners: Male   Other Topics Concern    Not on file   Social History Narrative    Not on file     Social Determinants of Health     Financial Resource Strain: Low Risk     Difficulty of Paying Living Expenses: Not hard at all   Food Insecurity: No Food Insecurity    Worried About Running Out of Food in the Last Year: Never true    [address] in the Last Year: Never true   Transportation Needs:     Lack of Transportation (Medical):  Lack of Transportation (Non-Medical):    Physical Activity:     Days of Exercise per Week:     Minutes of Exercise per Session:    Stress:     Feeling of Stress :    Social Connections:     Frequency of Communication with Friends and Family:     Frequency of Social Gatherings with Friends and Family:     Attends [employer] Services:     Active Member of Clubs or Organizations:     Attends Club or Organization Meetings:     Marital Status:    Intimate Partner Violence:     Fear of Current or Ex-Partner:     Emotionally Abused:     Physically Abused:     Sexually Abused:        Review of Systems:  Review of Systems   Musculoskeletal: Positive for back pain. Gastrointestinal: Negative for abdominal pain and bowel incontinence. Genitourinary: Negative for bladder incontinence. Physical Exam:  Ht 5' 4\" (1.626 m)   Wt 223 lb (101.2 kg)   BMI 38.28 kg/m²     Physical Exam  Constitutional:       Appearance: Normal appearance. Pulmonary:      Effort: Pulmonary effort is normal.   Neurological:      Mental Status: She is alert.    Psychiatric:         Attention and Perception: Attention and perception normal.         Mood and Affect: Mood and affect normal.         Record/Diagnostics Review:    As

## 2021-08-27 ENCOUNTER — HOSPITAL ENCOUNTER (OUTPATIENT)
Dept: PHYSICAL THERAPY | Facility: CLINIC | Age: 31
Setting detail: THERAPIES SERIES
Discharge: HOME | End: 2021-08-27
Payer: COMMERCIAL

## 2021-08-27 PROCEDURE — 97110 THERAPEUTIC EXERCISES: CPT

## 2021-08-27 NOTE — FLOWSHEET NOTE
[] [facility] [address]  [address]  [phone]  F: [phone] [] [address]  [facility] 36   [address]  P: [phone]  F: [phone] [x] [address]  Therapy  [address] [facility]  P: [phone]  F: [phone] [] [address]  P: [phone]  F: [phone] [] [address]  [facility]   [address]   [place]: [phone]  F: [phone]      Physical Therapy Daily Treatment Note    Date:  2021  Patient Name:  Shama March    :  1990  MRN: [id number]   MRN: [id number]  Physician: Claude Moralez MD               Insurance: Karle Lint choice [facility]  Auth after   Medical Diagnosis: M51.27 (ICD-10-CM) - Herniated nucleus pulposus, L5-S1                  Rehab Codes: M54.5  Onset Date: 2020             Next 's appt.: 21  Visit# / total visits: 3/12     Cancels/No Shows: 0/0    Subjective:    Pain:  [] Yes  [x] No  Pain Rating: (0-10 scale) 0/10  Pain altered Tx:  [] No  [] Yes  Action:  Comments: Patient reports soreness this date as she did not get a chance to do her exercises this AM    Objective:  Modalities:   Precautions:  Exercises:  Exercise Reps/ Time Weight/ Level Comments   lep pull front prep      Supine core pull down      Prone breathing drill   Review   Mid back series      Side lying abduction      Side lying HABD      Pelvic  Isolation sitting            Pt. edu   Pillows for sleeping position, TVA with transfers   Side lying seg rotation                  Other:    Treatment Charges: Mins Units   []  Modalities     [x]  Ther Exercise 50 3   []  Manual Therapy     []  Ther Activities     []  Aquatics     []  Vasocompression     []  Other     Total Treatment time 50 3

## 2021-08-30 ENCOUNTER — APPOINTMENT (OUTPATIENT)
Dept: PHYSICAL THERAPY | Facility: CLINIC | Age: 31
End: 2021-08-30
Payer: COMMERCIAL

## 2021-09-09 ENCOUNTER — HOSPITAL ENCOUNTER (OUTPATIENT)
Dept: PAIN MANAGEMENT | Facility: CLINIC | Age: 31
Discharge: HOME | End: 2021-09-09
Payer: COMMERCIAL

## 2021-09-09 VITALS
WEIGHT: 223 LBS | BODY MASS INDEX: 38.07 KG/M2 | HEIGHT: 64 IN | RESPIRATION RATE: 17 BRPM | TEMPERATURE: 97.2 F | HEART RATE: 58 BPM | SYSTOLIC BLOOD PRESSURE: 129 MMHG | DIASTOLIC BLOOD PRESSURE: 84 MMHG | OXYGEN SATURATION: 98 %

## 2021-09-09 DIAGNOSIS — R52 PAIN MANAGEMENT: ICD-10-CM

## 2021-09-09 LAB — HCG, PREGNANCY URINE (POC): NEGATIVE

## 2021-09-09 PROCEDURE — 2580000003 HC RX 258: Performed by: PAIN MEDICINE

## 2021-09-09 PROCEDURE — 64493 INJ PARAVERT F JNT L/S 1 LEV: CPT

## 2021-09-09 PROCEDURE — 64494 INJ PARAVERT F JNT L/S 2 LEV: CPT | Performed by: PAIN MEDICINE

## 2021-09-09 PROCEDURE — 6360000002 HC RX W HCPCS: Performed by: PAIN MEDICINE

## 2021-09-09 PROCEDURE — 64495 INJ PARAVERT F JNT L/S 3 LEV: CPT

## 2021-09-09 PROCEDURE — 64494 INJ PARAVERT F JNT L/S 2 LEV: CPT

## 2021-09-09 PROCEDURE — 2500000003 HC RX 250 WO HCPCS: Performed by: PAIN MEDICINE

## 2021-09-09 PROCEDURE — 64493 INJ PARAVERT F JNT L/S 1 LEV: CPT | Performed by: PAIN MEDICINE

## 2021-09-09 PROCEDURE — 81025 URINE PREGNANCY TEST: CPT

## 2021-09-09 RX ORDER — FENTANYL CITRATE 50 UG/ML
INJECTION, SOLUTION INTRAMUSCULAR; INTRAVENOUS
Status: COMPLETED | OUTPATIENT
Start: 2021-09-09 | End: 2021-09-09

## 2021-09-09 RX ORDER — SODIUM CHLORIDE 0.9 % (FLUSH) 0.9 %
SYRINGE (ML) INJECTION
Status: COMPLETED | OUTPATIENT
Start: 2021-09-09 | End: 2021-09-09

## 2021-09-09 RX ORDER — LIDOCAINE HYDROCHLORIDE 10 MG/ML
INJECTION, SOLUTION EPIDURAL; INFILTRATION; INTRACAUDAL; PERINEURAL
Status: COMPLETED | OUTPATIENT
Start: 2021-09-09 | End: 2021-09-09

## 2021-09-09 RX ORDER — BUPIVACAINE HYDROCHLORIDE 2.5 MG/ML
INJECTION, SOLUTION EPIDURAL; INFILTRATION; INTRACAUDAL
Status: COMPLETED | OUTPATIENT
Start: 2021-09-09 | End: 2021-09-09

## 2021-09-09 RX ADMIN — Medication 3 ML: at 15:51

## 2021-09-09 RX ADMIN — FENTANYL CITRATE 50 MCG: 50 INJECTION INTRAMUSCULAR; INTRAVENOUS at 15:57

## 2021-09-09 RX ADMIN — BUPIVACAINE HYDROCHLORIDE 10 ML: 2.5 INJECTION, SOLUTION EPIDURAL; INFILTRATION; INTRACAUDAL; PERINEURAL at 16:03

## 2021-09-09 RX ADMIN — FENTANYL CITRATE 50 MCG: 50 INJECTION INTRAMUSCULAR; INTRAVENOUS at 15:50

## 2021-09-09 RX ADMIN — LIDOCAINE HYDROCHLORIDE 3 ML: 10 INJECTION, SOLUTION EPIDURAL; INFILTRATION; INTRACAUDAL at 15:58

## 2021-09-09 RX ADMIN — Medication 3 ML: at 15:59

## 2021-09-09 ASSESSMENT — PAIN DESCRIPTION - FREQUENCY: FREQUENCY: CONTINUOUS

## 2021-09-09 ASSESSMENT — PAIN - FUNCTIONAL ASSESSMENT
PAIN_FUNCTIONAL_ASSESSMENT: 0-10
PAIN_FUNCTIONAL_ASSESSMENT: PREVENTS OR INTERFERES SOME ACTIVE ACTIVITIES AND ADLS

## 2021-09-09 ASSESSMENT — PAIN DESCRIPTION - LOCATION: LOCATION: BACK

## 2021-09-09 ASSESSMENT — PAIN DESCRIPTION - PROGRESSION: CLINICAL_PROGRESSION: GRADUALLY WORSENING

## 2021-09-09 ASSESSMENT — PAIN DESCRIPTION - ORIENTATION: ORIENTATION: LOWER

## 2021-09-09 ASSESSMENT — PAIN DESCRIPTION - PAIN TYPE: TYPE: CHRONIC PAIN

## 2021-09-09 ASSESSMENT — PAIN DESCRIPTION - DESCRIPTORS: DESCRIPTORS: ACHING;STABBING

## 2021-09-09 ASSESSMENT — PAIN SCALES - GENERAL: PAINLEVEL_OUTOF10: 6

## 2021-09-09 ASSESSMENT — PAIN DESCRIPTION - ONSET: ONSET: AWAKENED FROM SLEEP

## 2021-09-09 NOTE — OP NOTE
Lumbar Facet Nerve Block Injection:  Surgeon: Zain Desai MD     PRE-OP DIAGNOSIS: M47.817 (lumbosacral spondylosis), M54.5 (low back pain)    POST-OP DIAGNOSIS: Same. PROCEDURE PERFORMED: Lumbar Facet Nerve Block Multiple Levels  Bilateral L4 - 5 and L5 - S1. Physician confirmed and marked the surgical site. EBL: minimal      CONSENT: Patient has undergone the educational process with this procedure, is aware and fully understands the risks involved: potential damage to any and all body organs including possible bleeding, infection and nerve injury, allergic reaction and headache. Patient also understands that the procedure will be undertaken in a safe, controlled, and monitored setting. Patient recognizes that the benefits include relief from pain and reduction in the oral use of medications. Patient agreed to proceed. The patient was counseled at length about the risks of yoel Covid-19 during their perioperative period and any recovery window from their procedure. The patient was made aware that yoel Covid-19  may worsen their prognosis for recovering from their procedure  and lend to a higher morbidity and/or mortality risk. All material risks, benefits, and reasonable alternatives including postponing the procedure were discussed. The patient does wish to proceed with the procedure at this time. PREP: Timeout was performed prior to starting the procedure. The patient's back was prepped with chloroprep and draped appropriately. 5ml of 0.5% lidocaine was used to anesthetize the skin and subcutaneous tissue. PROCEDURE NOTE: A 22 gauge 3.5 inch spinal needle was advanced under  fluoroscopic guidance to the appropriate anatomic location for the medial branches corresponding to the facets at the base of the appropriate superior articular process and/or sacral ala . Aspiration was negative for blood, CSF and producing pain.  1 ml of 0.25% marcaine was then injected at each site to block medial branch nerve innervating the  Bilateral L4 - 5 and L5 - S1 facet joints. Patient tolerated the procedure well, no complications occurred. At the end of the injection the physician withdrew the needle and the nurse applied a sterile bandage to the site. Patient transferred to the recovery room in satisfactory condition. Appropriate written discharge instructions were given to the patient. If good results are obtained, Patient would be a candidate for Radiofrequency Ablation.       Vani Mckee MD

## 2021-09-09 NOTE — H&P
Pain Pre-Op H&P Note    Darwin Cadet MD    HPI: Tamanna Chan  presents with back pain. Past Medical History:   Diagnosis Date    Allergic rhinitis     Dermatitis     Hypoglycemia        Past Surgical History:   Procedure Laterality Date    WISDOM TOOTH EXTRACTION         History reviewed. No pertinent family history. Allergies   Allergen Reactions    Valium Other (See Comments)     Hallucinations           Current Outpatient Medications:     Norgestim-Eth Estrad Triphasic 0.18/0.215/0.25 MG-35 MCG TABS, TAKE 1 TABLET DAILY, Disp: 84 tablet, Rfl: 3    citalopram (CELEXA) 20 MG tablet, Take 1 tablet by mouth daily, Disp: 90 tablet, Rfl: 3    loratadine (CLARITIN REDITABS) 10 MG dissolvable tablet, Take 10 mg by mouth daily, Disp: , Rfl:     Social History     Tobacco Use    Smoking status: Never Smoker    Smokeless tobacco: Never Used   Substance Use Topics    Alcohol use: Yes     Comment: once a week       Review of Systems:   Focused review of systems was performed, and negative as pertinent to diagnosis, except as stated in HPI. Physical Exam  Constitutional:       Appearance: Normal appearance. Pulmonary:      Effort: Pulmonary effort is normal.   Neurological:      Mental Status: He is alert. Psychiatric:         Attention and Perception: Attention and perception normal.         Mood and Affect: Mood and affect normal.         Patient's current physical status, medications, medical history, and HPI have been reviewed and updated as appropriate on this date: 09/09/21    Risk/Benefit(s): The risks, benefits, alternatives, and potential complications have been discussed with the patient/family and informed consent has been obtained for the procedure/sedation.     Diagnosis:   Lumbar spondylosis      Plan: lumbar mbb        Darwin Cadet MD

## 2021-09-10 ENCOUNTER — TELEPHONE (OUTPATIENT)
Dept: PAIN MANAGEMENT | Age: 31
End: 2021-09-10

## 2021-09-10 ENCOUNTER — HOSPITAL ENCOUNTER (OUTPATIENT)
Dept: PHYSICAL THERAPY | Facility: CLINIC | Age: 31
Setting detail: THERAPIES SERIES
Discharge: HOME | End: 2021-09-10
Payer: COMMERCIAL

## 2021-09-10 PROCEDURE — 97110 THERAPEUTIC EXERCISES: CPT

## 2021-09-10 NOTE — TELEPHONE ENCOUNTER
Patient states pain was 6 out of 10 prior to the procedure with activity and 0 out of 10 after the procedure with 95% pain relief. Her activities included standing and housework and she said she had 95% pain relief. She does feel it was successful and would like to proceed with the next procedure.

## 2021-09-11 NOTE — FLOWSHEET NOTE
[] [address]  [address]  [phone]  F: [phone] [] [address]  [facility] 36   [address]  P: [phone]  F: [phone] [x] [address]  Therapy  [facility]  P: [phone]  F: [phone] [] [address]  P: [phone]  F: [phone] [] [address]  [facility]   [address]   [place]: [phone]  F: [phone]      Physical Therapy Daily Treatment Note    Date:  9/10/2021  Patient Name:  Joe Alejo    :  1990  MRN: [id number]   MRN: [id number]  Physician: Carol Pink MD               Insurance: Delwyn Richters choice [facility]  Auth after   Medical Diagnosis: M51.27 (ICD-10-CM) - Herniated nucleus pulposus, L5-S1                  Rehab Codes: M54.5  Onset Date: 2020             Next 's appt.: 21  Visit# / total visits:      Cancels/No Shows: 0/0    Subjective:    Pain:  [] Yes  [x] No  Pain Rating: (0-10 scale) 0/10  Pain altered Tx:  [] No  [] Yes  Action:  Comments:Patient reports that she had the first of her ablastion treatments yesterday and was cleared by the physician to do therapy today,    Objective:  Modalities:   Precautions:  Exercises:  Exercise Reps/ Time Weight/ Level Comments   lep pull front prep   Review increased reps   Supine core pull down   Added LE lift to table top   Modified side plank      Hip release      Sitting UE lift      Bridge w/ LE on stool      Clam shells w/ ball      Half foam roller scissors      Half foam roller circles      Pt edu   Fitness postures       Treatment Charges: Mins Units   []  Modalities     [x]  Ther Exercise 50 3   []  Manual Therapy     []  Ther Activities     []  Aquatics     []  Vasocompression     [] Other     Total Treatment time 50 3       Assessment: [] Progressing toward goals. [] No change. [x] Other:Progressed HEP to continue improvement with core stabilization. VC's used to improve rib cage alignment with trunk lifting. STG: (to be met in 6 treatments)  1. ? Pain: Patient to report the ability to transfer out of bed with 310 pain and with IND  2. ? ROM:  3. ? Strength:  4. ? Function:  5. Patient to be independent with home exercise program as demonstrated by performance with correct form without cues. 6. Demonstrate Knowledge of fall prevention  LTG: (to be met in 12 treatments)  1. Patient to report 0/10 pain with prolong sitting turning and getting out of bed  2. Patient to demonstrate the ability to maintain neutral alignment and mobility when completing transfers  3. Patient to report 0% deficit on Modified Oswestry         Patient goals: To have less pain       Pt. Education:  [x] Yes  [] No  [] Reviewed Prior HEP/Ed  Method of Education: [] Verbal  [] Demo  [] Written  Comprehension of Education:  [] Verbalizes understanding. [] Demonstrates understanding. [] Needs review. [] Demonstrates/verbalizes HEP/Ed previously given. Plan: [x] Continue current frequency toward long and short term goals.           Time In:8am            Time Out: 855am    Electronically signed by:  Octavia Joya PT

## 2021-09-16 ENCOUNTER — HOSPITAL ENCOUNTER (OUTPATIENT)
Dept: PHYSICAL THERAPY | Facility: CLINIC | Age: 31
Setting detail: THERAPIES SERIES
Discharge: HOME | End: 2021-09-16
Payer: COMMERCIAL

## 2021-09-16 PROCEDURE — 97110 THERAPEUTIC EXERCISES: CPT

## 2021-09-16 NOTE — FLOWSHEET NOTE
[] [facility]) - [facility] &  Therapy  [address]  [phone]  F: [phone] [] [address]  [facility] [address]   [address]  P: [phone]  F: [phone] [x] [address] &  Therapy  [address] [facility] [address]  P: [phone]  F: [phone] [] [address]  P: [phone]  F: [phone] [] [address]  [facility]   [address]   [place]: [phone]  F: [phone]      Physical Therapy Daily Treatment Note    Date:  2021  Patient Name:  Ranulfo Dong    :  1990  MRN: [id number]   MRN: [id number]  Physician: Piper Robbins MD               Insurance: Maurita Mallet choice [employer] 40/40 Auth after 40  Medical Diagnosis: M51.27 (ICD-10-CM) - Herniated nucleus pulposus, L5-S1                  Rehab Codes: M54.5  Onset Date: 2020             Next 's appt.: 21  Visit# / total visits: 12     Cancels/No Shows: 0/0    Subjective:    Pain:  [] Yes  [x] No  Pain Rating: (0-10 scale) 0/10  Pain altered Tx:  [] No  [] Yes  Action:  Comments:Patient reports thet the injection only lasted 4 days which was expected and she had a return of s/s but not as severe.     Objective:  Modalities:   Precautions:  Exercises:BOLD completed today  Exercise Reps/ Time Weight/ Level Comments   Foam Roller:      Laying heel lift      Laying toe lift      Modified scissors      Goal post stretch      V stretch      UE reach x20     Modified side plank      Modified mid back series      Bridge w/ LE on stool   Ball between knees         lep pull front prep   Review increased reps   Supine core pull down   Added LE lift to table top         Hip release      Sitting UE lift            Clam shells w/ ball      Half foam roller scissors      Half foam roller circles      Pt edu   Fitness postures       Treatment Charges: Mins Units   []  Modalities     [x]  Ther Exercise 50 3   []  Manual Therapy     []  Ther Activities     []  Aquatics     []  Vasocompression     []  Other     Total Treatment time 50 3       Assessment: [] Progressing toward goals. [] No change. [x] Other:Began with foam roller thoracic mobility for warm up followed by continued stabilization. Added to HEP and patient given a t band to use at home. Patient demonstrated good understanding    STG: (to be met in 6 treatments)  1. ? Pain: Patient to report the ability to transfer out of bed with 310 pain and with IND  2. ? ROM:  3. ? Strength:  4. ? Function:  5. Patient to be independent with home exercise program as demonstrated by performance with correct form without cues. 6. Demonstrate Knowledge of fall prevention  LTG: (to be met in 12 treatments)  1. Patient to report 0/10 pain with prolong sitting turning and getting out of bed  2. Patient to demonstrate the ability to maintain neutral alignment and mobility when completing transfers  3. Patient to report 0% deficit on Modified Oswestry         Patient goals: To have less pain       Pt. Education:  [x] Yes  [] No  [] Reviewed Prior HEP/Ed  Method of Education: [] Verbal  [] Demo  [] Written  Comprehension of Education:  [] Verbalizes understanding. [] Demonstrates understanding. [] Needs review. [] Demonstrates/verbalizes HEP/Ed previously given. Plan: [x] Continue current frequency toward long and short term goals.           Time In:1pm            Time Out: 2pm    Electronically signed by:  Lizette Mclaughlin, PT

## 2021-09-23 ENCOUNTER — HOSPITAL ENCOUNTER (OUTPATIENT)
Dept: PAIN MANAGEMENT | Facility: CLINIC | Age: 31
Discharge: HOME | End: 2021-09-23
Payer: COMMERCIAL

## 2021-09-23 VITALS
DIASTOLIC BLOOD PRESSURE: 53 MMHG | OXYGEN SATURATION: 97 % | HEART RATE: 68 BPM | TEMPERATURE: 97.5 F | RESPIRATION RATE: 12 BRPM | SYSTOLIC BLOOD PRESSURE: 96 MMHG

## 2021-09-23 DIAGNOSIS — R52 PAIN MANAGEMENT: ICD-10-CM

## 2021-09-23 LAB — HCG, PREGNANCY URINE (POC): NEGATIVE

## 2021-09-23 PROCEDURE — 81025 URINE PREGNANCY TEST: CPT

## 2021-09-23 PROCEDURE — 6360000002 HC RX W HCPCS: Performed by: PAIN MEDICINE

## 2021-09-23 PROCEDURE — 64494 INJ PARAVERT F JNT L/S 2 LEV: CPT | Performed by: PAIN MEDICINE

## 2021-09-23 PROCEDURE — 64493 INJ PARAVERT F JNT L/S 1 LEV: CPT

## 2021-09-23 PROCEDURE — 2500000003 HC RX 250 WO HCPCS: Performed by: PAIN MEDICINE

## 2021-09-23 PROCEDURE — 64495 INJ PARAVERT F JNT L/S 3 LEV: CPT

## 2021-09-23 PROCEDURE — 2580000003 HC RX 258: Performed by: PAIN MEDICINE

## 2021-09-23 PROCEDURE — 64494 INJ PARAVERT F JNT L/S 2 LEV: CPT

## 2021-09-23 PROCEDURE — 64493 INJ PARAVERT F JNT L/S 1 LEV: CPT | Performed by: PAIN MEDICINE

## 2021-09-23 RX ORDER — SODIUM CHLORIDE 9 MG/ML
INJECTION INTRAVENOUS
Status: COMPLETED | OUTPATIENT
Start: 2021-09-23 | End: 2021-09-23

## 2021-09-23 RX ORDER — SODIUM CHLORIDE 0.9 % (FLUSH) 0.9 %
SYRINGE (ML) INJECTION
Status: COMPLETED | OUTPATIENT
Start: 2021-09-23 | End: 2021-09-23

## 2021-09-23 RX ORDER — FENTANYL CITRATE 50 UG/ML
INJECTION, SOLUTION INTRAMUSCULAR; INTRAVENOUS
Status: COMPLETED | OUTPATIENT
Start: 2021-09-23 | End: 2021-09-23

## 2021-09-23 RX ORDER — BUPIVACAINE HYDROCHLORIDE 2.5 MG/ML
INJECTION, SOLUTION EPIDURAL; INFILTRATION; INTRACAUDAL
Status: COMPLETED | OUTPATIENT
Start: 2021-09-23 | End: 2021-09-23

## 2021-09-23 RX ORDER — LIDOCAINE HYDROCHLORIDE 10 MG/ML
INJECTION, SOLUTION EPIDURAL; INFILTRATION; INTRACAUDAL; PERINEURAL
Status: COMPLETED | OUTPATIENT
Start: 2021-09-23 | End: 2021-09-23

## 2021-09-23 RX ADMIN — Medication 3 ML: at 09:02

## 2021-09-23 RX ADMIN — FENTANYL CITRATE 100 MCG: 50 INJECTION INTRAMUSCULAR; INTRAVENOUS at 09:02

## 2021-09-23 RX ADMIN — BUPIVACAINE HYDROCHLORIDE 10 ML: 2.5 INJECTION, SOLUTION EPIDURAL; INFILTRATION; INTRACAUDAL; PERINEURAL at 09:08

## 2021-09-23 RX ADMIN — LIDOCAINE HYDROCHLORIDE 3 ML: 10 INJECTION, SOLUTION EPIDURAL; INFILTRATION; INTRACAUDAL at 09:06

## 2021-09-23 RX ADMIN — SODIUM CHLORIDE 3 ML: 9 INJECTION INTRAMUSCULAR; INTRAVENOUS; SUBCUTANEOUS at 09:06

## 2021-09-23 ASSESSMENT — PAIN SCALES - GENERAL
PAINLEVEL_OUTOF10: 0

## 2021-09-24 ENCOUNTER — HOSPITAL ENCOUNTER (OUTPATIENT)
Dept: PHYSICAL THERAPY | Facility: CLINIC | Age: 31
Setting detail: THERAPIES SERIES
Discharge: HOME | End: 2021-09-24
Payer: COMMERCIAL

## 2021-09-24 ENCOUNTER — TELEPHONE (OUTPATIENT)
Dept: PAIN MANAGEMENT | Age: 31
End: 2021-09-24

## 2021-09-24 DIAGNOSIS — M47.817 LUMBOSACRAL SPONDYLOSIS WITHOUT MYELOPATHY: Primary | ICD-10-CM

## 2021-09-24 DIAGNOSIS — M54.50 CHRONIC BILATERAL LOW BACK PAIN, UNSPECIFIED WHETHER SCIATICA PRESENT: ICD-10-CM

## 2021-09-24 DIAGNOSIS — G89.29 CHRONIC BILATERAL LOW BACK PAIN, UNSPECIFIED WHETHER SCIATICA PRESENT: ICD-10-CM

## 2021-09-24 PROCEDURE — 97110 THERAPEUTIC EXERCISES: CPT

## 2021-09-26 NOTE — FLOWSHEET NOTE
[] [facility]  [facility] &  Therapy  [address]  [phone]  F: [phone] [] [address]  [facility] 36   [address]  P: [phone]  F: [phone] [x] [address]  Therapy  [address] [facility]  P: [phone]  F: [phone] [] [address]  P: [phone]  F: [phone] [] [address]  [facility]   [address]   [place]: [phone]  F: [phone]      Physical Therapy Daily Treatment Note    Date:  2021  Patient Name:  Nico Otoole    :  1990  MRN: [id number]   MRN: [id number]  Physician: Evelin Jones MD               Insurance: [facility]  Auth after 40  Medical Diagnosis: M51.27 (ICD-10-CM) - Herniated nucleus pulposus, L5-S1                  Rehab Codes: M54.5  Onset Date: 2020             Next 's appt.: 21  Visit# / total visits:      Cancels/No Shows: 0/0    Subjective:    Pain:  [] Yes  [x] No  Pain Rating: (0-10 scale) 0/10  Pain altered Tx:  [] No  [] Yes  Action:  Comments:Patient reports receiving the second injection yesterday and that it helped but not as much as the first injection did      Objective:  Modalities:   Precautions:  Exercises:BOLD completed today  Exercise Reps/ Time Weight/ Level Comments   Supine LE lift   Knee extended   Spine rotation   Side lying thoracic only   Prone UE slides      Side lying thoracic seg rotation      Sitting rotation   thoracic only    Sitting houser      Modified mid back series    1 UE only      Bridge w/ LE on stool   Ball between knees                               Treatment Charges: Mins Units   []  Modalities     [x]  Ther Exercise 50 3   []  Manual Therapy     []  Ther Activities     []  Aquatics     [] Vasocompression     []  Other     Total Treatment time 50 3       Assessment: [] Progressing toward goals. [] No change. [x] Other: Held foam rolling today as patient is sore at injection site. Focused treatment on unilateral stabilization and stabilization of thoracic positioning. Patient education on how to activate obliques and rectus abdominus to eliminate arch. STG: (to be met in 6 treatments)  1. ? Pain: Patient to report the ability to transfer out of bed with 310 pain and with IND  2. ? ROM:  3. ? Strength:  4. ? Function:  5. Patient to be independent with home exercise program as demonstrated by performance with correct form without cues. 6. Demonstrate Knowledge of fall prevention  LTG: (to be met in 12 treatments)  1. Patient to report 0/10 pain with prolong sitting turning and getting out of bed  2. Patient to demonstrate the ability to maintain neutral alignment and mobility when completing transfers  3. Patient to report 0% deficit on Modified Oswestry         Patient goals: To have less pain       Pt. Education:  [x] Yes  [] No  [] Reviewed Prior HEP/Ed  Method of Education: [] Verbal  [] Demo  [] Written  Comprehension of Education:  [] Verbalizes understanding. [] Demonstrates understanding. [] Needs review. [] Demonstrates/verbalizes HEP/Ed previously given. Plan: [x] Continue current frequency toward long and short term goals.           Time In:8am         Time Out: 9am    Electronically signed by:  Alana Nayak PT

## 2021-09-29 ENCOUNTER — OFFICE VISIT (OUTPATIENT)
Dept: NEUROSURGERY | Age: 31
End: 2021-09-29
Payer: COMMERCIAL

## 2021-09-29 VITALS
DIASTOLIC BLOOD PRESSURE: 74 MMHG | SYSTOLIC BLOOD PRESSURE: 116 MMHG | HEIGHT: 64 IN | OXYGEN SATURATION: 98 % | HEART RATE: 80 BPM | BODY MASS INDEX: 38.07 KG/M2 | WEIGHT: 223 LBS

## 2021-09-29 DIAGNOSIS — M51.16 LUMBAR DISC DISEASE WITH RADICULOPATHY: Primary | ICD-10-CM

## 2021-09-29 PROCEDURE — 99214 OFFICE O/P EST MOD 30 MIN: CPT | Performed by: NEUROLOGICAL SURGERY

## 2021-09-29 NOTE — PROGRESS NOTES
[place] [id number] [place] , [address] [place] [address]  Dept: [phone]    Patient:  Tamanna Chan  YOB: 1990  Date: 9/29/21    The patient is a 26 y.o. female who presents today for consult of the following problems:     Chief Complaint   Patient presents with    Follow-up             HPI:     Tamanna Chan is a 26 y.o. female on whom neurosurgical consultation was requested by Dina Gil MD for management of lumbar spondylosis and disc disease with radiculopathy. The patient has been through approximately 6-7 physical therapy sessions and underwent to L4-S1 facet blocks bilaterally. Her base level pain bilateral para spasmodic with radiation down bilateral lower extremities in the S1 distribution since that approximate 6-7 out of 10 at baseline. Axial symptoms are predominant throughout the day with leg symptoms only occurring when she arises from a lying position early in the morning. She states that both facet blocks helped her significantly. Of note the initial facet block L4-S1 provided greater than 80% of relief with a significant improvement in her overall mobility and quality of life for approximately 48 hours duration with subsequent slow recurrence of the symptoms. The second injection worked similarly but not to the dramatic extent as the initial 1 with greater than 50% relief in pain lasted for approximate 24 hours. She did note that she was unable to mobilize as much after her second injection and was bedbound for a couple of hours and this may have affected the overall prognosis from the injection. Having any new saddle anesthesia or lower extremity weakness or falls. Currently is working as an  predominantly seated at a desk for most periods.       History:     Past Medical History:   Diagnosis Date    Allergic rhinitis     Dermatitis     m).    Weight as of this encounter: 223 lb (101.2 kg). General:  Roxanna Oakley is a 26y.o. year old female who appears her stated age. HEENT: Normocephalic atraumatic. Neck supple. Chest: regular rate; pulses equal  Abdomen: Soft nontender nondistended. Normoactive bowel sounds. Ext: DP and PT pulses 2+, good cap refill  Neuro    Mentation  Appropriate affect  Registration intact  Orientation intact  3 item recall intact  Judgement intact to situation    Cranial Nerves:   Pupils equal and reactive to light  Extraocular motion intact  Face and shrug symmetric  Tongue midline  No dysarthria  v1-3 sensation symmetric, masseter tone symmetric  Hearing symmetric and intact to finger rub    Sensation:   Intact    Motor  L deltoid 5/5; R deltoid 5/5  L biceps 5/5; R biceps 5/5  L triceps 5/5; R triceps 5/5  L wrist extension 5/5; R wrist extension 5/5  L intrinsics 5/5; R intrinsics 5/5     L iliopsoas 5/5 , R iliopsoas 5/5  L quadriceps 5/5; R quadriceps 5/5  L Dorsiflexion 5/5; R dorsiflexion 5/5  L Plantarflexion 5/5; R plantarflexion 5/5  L EHL 5/5; R EHL 5/5    Reflexes  L Brachioradialis 2+/4; R brachioradialis 2+/4  L Biceps 2+/4; R Biceps 2+/4  L Triceps 2+/4; R Triceps 2+/4  L Patellar 2+/4: R Patellar 2+/4  L Achilles 2+/4; R Achilles 2+/4    hoffmans L: neg  hoffmans R: neg  Clonus L: neg  Clonus R: neg  Babinski L: up  Babinski R; up    Studies Review:     Flexion-extension x-rays without any gross instability present. Assessment and Plan:      1. Lumbar disc disease with radiculopathy          Plan: Patient doing fairly well partially through conservative multimodal regimen. Would recommend continuation of physical therapy and instructed her to learn the maneuvers including ergonomics and daily stretches and core strengthening exercises that I am fairly confident she can do on her own.   In addition I did discuss simple ergonomic measures including insoles for the shoes as well as frequent breaks from a seated position at work considering that she works predominated desk. I did discuss desk height as well as screen height and proper posture with lumbar support. I would support moving forward with the ablation considering resounding improvement with both focal blocks at L4-S1. I also did reiterate the weight loss factor which is a major contributing factor to her axial pain and discogenic disease. She is fully aware of this and has had previous episodes where she is able to lose a significant amount of weight but has struggled recently due to the back pain limiting her ability to be active. I did discuss diet as it contributes significantly to weight control as well    F/u 12-14wks with [facility]    Followup: No follow-ups on file. Prescriptions Ordered:  No orders of the defined types were placed in this encounter. Orders Placed:  No orders of the defined types were placed in this encounter. Electronically signed by Phan Rodriguez DO on 9/29/2021 at 10:32 AM    Please note that this chart was generated using voice recognition Dragon dictation software. Although every effort was made to ensure the accuracy of this automated transcription, some errors in transcription may have occurred.

## 2021-10-01 ENCOUNTER — HOSPITAL ENCOUNTER (OUTPATIENT)
Dept: PHYSICAL THERAPY | Facility: CLINIC | Age: 31
Setting detail: THERAPIES SERIES
Discharge: HOME | End: 2021-10-01
Payer: COMMERCIAL

## 2021-10-01 PROCEDURE — 97110 THERAPEUTIC EXERCISES: CPT

## 2021-10-01 NOTE — FLOWSHEET NOTE
[] [facility]) - [facility] &  Therapy  [address]  [phone]  F: [phone] [] [address]  [facility]   [address]  P: [phone]  F: [phone] [x] [address] &  Therapy  [address]  P: [phone]  F: [phone] [] [address]  P: [phone]  F: [phone] [] [address]  [facility]   [address]   [place]: [phone]  F: [phone]      Physical Therapy Daily Treatment Note    Date:  10/1/2021  Patient Name:  Supa Garcia    :  1990  MRN: [id number]   MRN: [id number]  Physician: Sakina Odom MD               Insurance: [employer]  Auth after 40  Medical Diagnosis: M51.27 (ICD-10-CM) - Herniated nucleus pulposus, L5-S1                  Rehab Codes: M54.5  Onset Date: 2020             Next 's appt.: 21  Visit# / total visits:      Cancels/No Shows: 0/0    Subjective:    Pain:  [x] Yes  [] No  Pain Rating: (0-10 scale)  /10  Pain altered Tx:  [] No  [] Yes  Action:  Comments: Woke up this AM with a sharp pinching feeling in L SIJ -nd yesterday had a lot of SIJ pain 9/10.  Sees chiropractor next week      Objective:  Modalities:   Precautions:  Exercises:BOLD completed today  Exercise Reps/ Time Weight/ Level Comments   Supine LE lift   Knee extended   Spine rotation   Side lying thoracic only   Prone UE slides      mermaid      Side lying thoracic rotation      Foam roller reach      Foam roller  scissors      Foam roller circles      Sitting rotation   thoracic only    Sitting houser      Modified mid back series    1 UE only      Bridge w/ LE on stool   Ball between knees   Clam shells w/ ball      Spine stretch fwd      Hip release with T  top

## 2021-10-06 ENCOUNTER — PATIENT MESSAGE (OUTPATIENT)
Dept: NEUROSURGERY | Age: 31
End: 2021-10-06

## 2021-10-06 ENCOUNTER — HOSPITAL ENCOUNTER (OUTPATIENT)
Dept: PHYSICAL THERAPY | Facility: CLINIC | Age: 31
Setting detail: THERAPIES SERIES
Discharge: HOME | End: 2021-10-06
Payer: COMMERCIAL

## 2021-10-06 PROCEDURE — 97110 THERAPEUTIC EXERCISES: CPT

## 2021-10-06 NOTE — FLOWSHEET NOTE
[] [address]  [address]  [phone]  F: [phone] [] [address]  [facility] 36   [address]  P: [phone]  F: [phone] [x] [address] &  Therapy  [address]  P: [phone]  F: [phone] [] [address]  P: [phone]  F: [phone] [] [address]  [facility]   [address]   [place]: [phone]  F: [phone]      Physical Therapy Daily Treatment Note    Date:  10/6/2021  Patient Name:  Savannah Jones    :  1990  MRN: [id number]   MRN: [id number]  Physician: Rahul Clayton MD               Insurance: [employer]  Auth after   Medical Diagnosis: M51.27 (ICD-10-CM) - Herniated nucleus pulposus, L5-S1                  Rehab Codes: M54.5  Onset Date: 2020             Next 's appt.: 21  Visit# / total visits:      Cancels/No Shows: 0/0    Subjective:    Pain:  [x] Yes  [] No  Pain Rating: (0-10 scale) 2 /10  Pain altered Tx:  [] No  [] Yes  Action:  Comments: Patient reports waking this AM with high pain that she was able to reduce to 2/10 with exercises      Objective:  Modalities:   Precautions:  Exercises:BOLD completed today  Exercise Reps/ Time Weight/ Level Comments                     mermaid                  Foam roller reach      Foam roller  scissors      Foam roller circles      barrel stretch SL      Sitting houser      Modified mid back series  lime  1 UE only      Bridge w/ LE on stool   Ball between knees         Spine stretch fwd      Hip release with T  top                Treatment Charges: Mins Units   []  Modalities     [x]  Ther Exercise 50 3   []  Manual Therapy     []  Ther Activities     []  Aquatics     []  Vasocompression []  Other     Total Treatment time 50 3       Assessment: [] Progressing toward goals. [] No change. [x] Other: Focused on thoracic rotation and pelvic stabilization. VC's used to advance challenge without SIJ compromise. STG: (to be met in 6 treatments)  1. ? Pain: Patient to report the ability to transfer out of bed with 310 pain and with IND-MET  2. ? ROM:  3. ? Strength:  4. ? Function:  5. Patient to be independent with home exercise program as demonstrated by performance with correct form without cues. -MET  6. Demonstrate Knowledge of fall prevention-MET  LTG: (to be met in 12 treatments)  1. Patient to report 0/10 pain with prolong sitting turning and getting out of bed  2. Patient to demonstrate the ability to maintain neutral alignment and mobility when completing transfers  3. Patient to report 0% deficit on Modified Oswestry         Patient goals: To have less pain       Pt. Education:  [x] Yes  [] No  [] Reviewed Prior HEP/Ed  Method of Education: [] Verbal  [] Demo  [] Written  Comprehension of Education:  [] Verbalizes understanding. [] Demonstrates understanding. [] Needs review. [] Demonstrates/verbalizes HEP/Ed previously given. Plan: [x] Continue current frequency toward long and short term goals.           Time In:9am         Time Out: 955am    Electronically signed by:  Jamaal Buchanan PT

## 2021-10-08 ENCOUNTER — APPOINTMENT (OUTPATIENT)
Dept: PHYSICAL THERAPY | Facility: CLINIC | Age: 31
End: 2021-10-08
Payer: COMMERCIAL

## 2021-10-12 ENCOUNTER — HOSPITAL ENCOUNTER (OUTPATIENT)
Dept: NUTRITION | Age: 31
Discharge: HOME | End: 2021-10-12
Payer: COMMERCIAL

## 2021-10-12 PROCEDURE — 97802 MEDICAL NUTRITION INDIV IN: CPT

## 2021-10-12 NOTE — PROGRESS NOTES
Nutrition Note    Pt seen for outpatient nutrition counseling today by YAMILETH for weight loss. Pt stated that in 2019 she [facility] lbs [facility] and ended up gaining 50 lbs last year during [place]. Pt stated that she is newly diagnosed with celiac and is lactose intolerant. Pt stated that she usus ally eats 2 meals a day with 1 snack. Pt admits to late night snacking as well. Educated and provided pt with handouts on My Plate, 0406 kcal meal plans, weight loss tips/management, and how to eat more fruits and vegetables. Informed pt that every meal should be a balance of fruits, vegetables, protein, grain(gluten free) and dairy(lactose free). Informed pt to consume adequate amounts of water and to keep track of her calories in [place] for a few weeks to get a better understanding as to what 1500 kcals/d looks like. Educated pt that portion control is very important when trying to lose weight. Pt stated understanding and is very motivated, all questions answered at this time.      Electronically signed by Tatiana Read RD, LD on 10/12/21 at 2:59 PM EDT    Contact: [phone]

## 2021-10-14 ENCOUNTER — HOSPITAL ENCOUNTER (OUTPATIENT)
Dept: PAIN MANAGEMENT | Facility: CLINIC | Age: 31
Discharge: HOME | End: 2021-10-14
Payer: COMMERCIAL

## 2021-10-14 VITALS
WEIGHT: 223 LBS | BODY MASS INDEX: 38.07 KG/M2 | OXYGEN SATURATION: 96 % | DIASTOLIC BLOOD PRESSURE: 59 MMHG | TEMPERATURE: 97.6 F | SYSTOLIC BLOOD PRESSURE: 104 MMHG | HEIGHT: 64 IN | HEART RATE: 62 BPM | RESPIRATION RATE: 13 BRPM

## 2021-10-14 DIAGNOSIS — R52 PAIN MANAGEMENT: ICD-10-CM

## 2021-10-14 LAB — HCG, PREGNANCY URINE (POC): NEGATIVE

## 2021-10-14 PROCEDURE — 81025 URINE PREGNANCY TEST: CPT

## 2021-10-14 PROCEDURE — 64636 DESTROY L/S FACET JNT ADDL: CPT

## 2021-10-14 PROCEDURE — 6360000002 HC RX W HCPCS: Performed by: PAIN MEDICINE

## 2021-10-14 PROCEDURE — 2580000003 HC RX 258: Performed by: PAIN MEDICINE

## 2021-10-14 PROCEDURE — 64636 DESTROY L/S FACET JNT ADDL: CPT | Performed by: PAIN MEDICINE

## 2021-10-14 PROCEDURE — 64635 DESTROY LUMB/SAC FACET JNT: CPT

## 2021-10-14 PROCEDURE — 2500000003 HC RX 250 WO HCPCS: Performed by: PAIN MEDICINE

## 2021-10-14 PROCEDURE — 64635 DESTROY LUMB/SAC FACET JNT: CPT | Performed by: PAIN MEDICINE

## 2021-10-14 RX ORDER — SODIUM CHLORIDE 0.9 % (FLUSH) 0.9 %
SYRINGE (ML) INJECTION
Status: COMPLETED | OUTPATIENT
Start: 2021-10-14 | End: 2021-10-14

## 2021-10-14 RX ORDER — SODIUM CHLORIDE 9 MG/ML
INJECTION INTRAVENOUS
Status: COMPLETED | OUTPATIENT
Start: 2021-10-14 | End: 2021-10-14

## 2021-10-14 RX ORDER — BUPIVACAINE HYDROCHLORIDE 2.5 MG/ML
INJECTION, SOLUTION EPIDURAL; INFILTRATION; INTRACAUDAL
Status: COMPLETED | OUTPATIENT
Start: 2021-10-14 | End: 2021-10-14

## 2021-10-14 RX ORDER — LIDOCAINE HYDROCHLORIDE 10 MG/ML
INJECTION, SOLUTION EPIDURAL; INFILTRATION; INTRACAUDAL; PERINEURAL
Status: COMPLETED | OUTPATIENT
Start: 2021-10-14 | End: 2021-10-14

## 2021-10-14 RX ORDER — FENTANYL CITRATE 50 UG/ML
INJECTION, SOLUTION INTRAMUSCULAR; INTRAVENOUS
Status: COMPLETED | OUTPATIENT
Start: 2021-10-14 | End: 2021-10-14

## 2021-10-14 RX ADMIN — FENTANYL CITRATE 100 MCG: 50 INJECTION INTRAMUSCULAR; INTRAVENOUS at 08:18

## 2021-10-14 RX ADMIN — LIDOCAINE HYDROCHLORIDE 3 ML: 10 INJECTION, SOLUTION EPIDURAL; INFILTRATION; INTRACAUDAL at 08:22

## 2021-10-14 RX ADMIN — SODIUM CHLORIDE 3 ML: 9 INJECTION INTRAMUSCULAR; INTRAVENOUS; SUBCUTANEOUS at 08:22

## 2021-10-14 RX ADMIN — BUPIVACAINE HYDROCHLORIDE 6 ML: 2.5 INJECTION, SOLUTION EPIDURAL; INFILTRATION; INTRACAUDAL; PERINEURAL at 08:26

## 2021-10-14 RX ADMIN — Medication 3 ML: at 08:18

## 2021-10-14 ASSESSMENT — PAIN - FUNCTIONAL ASSESSMENT
PAIN_FUNCTIONAL_ASSESSMENT: PREVENTS OR INTERFERES SOME ACTIVE ACTIVITIES AND ADLS
PAIN_FUNCTIONAL_ASSESSMENT: 0-10
PAIN_FUNCTIONAL_ASSESSMENT: 0-10

## 2021-10-14 ASSESSMENT — PAIN DESCRIPTION - DESCRIPTORS: DESCRIPTORS: ACHING;STABBING

## 2021-10-14 NOTE — H&P
Pain Pre-Op H&P Note    Orion Carroll MD    HPI: Teresa Ritchie  presents with back pain. Past Medical History:   Diagnosis Date    Allergic rhinitis     Dermatitis     Hypoglycemia        Past Surgical History:   Procedure Laterality Date    WISDOM TOOTH EXTRACTION         No family history on file. Allergies   Allergen Reactions    Valium Other (See Comments)     Hallucinations           Current Outpatient Medications:     Norgestim-Eth Estrad Triphasic 0.18/0.215/0.25 MG-35 MCG TABS, TAKE 1 TABLET DAILY, Disp: 84 tablet, Rfl: 3    citalopram (CELEXA) 20 MG tablet, Take 1 tablet by mouth daily, Disp: 90 tablet, Rfl: 3    loratadine (CLARITIN REDITABS) 10 MG dissolvable tablet, Take 10 mg by mouth daily, Disp: , Rfl:     Social History     Tobacco Use    Smoking status: Never Smoker    Smokeless tobacco: Never Used   Substance Use Topics    Alcohol use: Yes     Comment: once a week       Review of Systems:   Focused review of systems was performed, and negative as pertinent to diagnosis, except as stated in HPI. Physical Exam  Constitutional:       Appearance: Normal appearance. Pulmonary:      Effort: Pulmonary effort is normal.   Neurological:      Mental Status: He is alert. Psychiatric:         Attention and Perception: Attention and perception normal.         Mood and Affect: Mood and affect normal.         Patient's current physical status, medications, medical history, and HPI have been reviewed and updated as appropriate on this date: 10/14/21    Risk/Benefit(s): The risks, benefits, alternatives, and potential complications have been discussed with the patient/family and informed consent has been obtained for the procedure/sedation.     Diagnosis:   spondylosis      Plan: lumbar rfa        Orion Carroll MD

## 2021-10-14 NOTE — OP NOTE
Lumbar Radiofrequency Ablation:  SURGEON: Sergey Kerr MD    PRE-OP DIAGNOSIS: M47.817 (lumbosacral spondylosis), M54.5 (low back pain)    POST-OP DIAGNOSIS: Same. PROCEDURE PERFORMED: Radiofrequency Ablation Medial Branch Nerve at Right L4 - 5 and L5 - S1 facet joint(s). HISTORY AND INDICATIONS: Satisfactory response with previous RFA/ medial branch blocks at the indicated levels. Recurrence of painful symptoms attributed to the above diagnosis. The planned treatment is medically necessary to relieve pain, restore function and or reduce reliance on pain medication. EBL: minimal    CONSENT: Patient has undergone the educational process with this procedure, is aware and fully understands the risks involved: potential damage to any and all body organs including possible bleeding, infection, nerve injury, paralysis, allergic reaction and headache. Patient also understands that the procedure will be undertaken in a safe, controlled and monitored setting. Patient recognizes that the benefits may include relief from pain and reduction in the oral use of medications. Patient agreed to proceed. The patient was counseled at length about the risks of yoel Covid-19 during their perioperative period and any recovery window from their procedure. The patient was made aware that yoel Covid-19  may worsen their prognosis for recovering from their procedure  and lend to a higher morbidity and/or mortality risk. All material risks, benefits, and reasonable alternatives including postponing the procedure were discussed. The patient does wish to proceed with the procedure at this time. MONITORS INSTITUTED INCLUDE but not limited to:   Pulse Oximetry  Non-invasive blood pressure monitoring    PROCEDURE NOTE: The patient was taken to the procedure room and placed prone with the appropriate padding and positioning to assure patient comfort and physician access to the procedure site.  Time out was performed prior to starting the procedure. Fluoroscopic evaluation was utilized to target the appropriate treatment areas. The skin was prepped with antiseptic solution and draped sterilely. Then 0.5% lidocaine was used to anesthetize the skin and subcutaneous tissue. Under fluoroscopic guidance a 20 gauge x 10cm x 10mm active tip was advanced to the medial branch nerve at the indicated levels below to innervate the following facet joints Right L4 - 5 and L5 - S1 . The needles were placed sequentially. Position confirmed radiographically with the fluoroscope. Active tip corresponding at the base of the superior articulating process and/or sacral ala for the lumbar RFA. Motor stimulation checked at 2hz up to 3V and confirmed negative for radicular stimulation. After confirmation of the needle placement the patient received 1 ml of 0.25% marcaine to provide anesthesia. Radiofrequency was delivered to the lumbar region at 80 degrees for a limit of 90 seconds in length with no ill effect. The patient tolerated the procedure well and was transported to the recovery room where the patient was monitored with no complications and with stable vital signs. Patient was discharged with appropriate written discharge instructions. Follow-up discussed.       Merna Grimaldo MD

## 2021-10-19 NOTE — H&P
Pain Pre-Op H&P Note    Estuardo Roger MD    HPI: Sharonda Stone  presents with back pain. Past Medical History:   Diagnosis Date    Allergic rhinitis     Dermatitis     Hypoglycemia        Past Surgical History:   Procedure Laterality Date    WISDOM TOOTH EXTRACTION         History reviewed. No pertinent family history. Allergies   Allergen Reactions    Valium Other (See Comments)     Hallucinations           Current Outpatient Medications:     Norgestim-Eth Estrad Triphasic 0.18/0.215/0.25 MG-35 MCG TABS, TAKE 1 TABLET DAILY, Disp: 84 tablet, Rfl: 3    citalopram (CELEXA) 20 MG tablet, Take 1 tablet by mouth daily, Disp: 90 tablet, Rfl: 3    loratadine (CLARITIN REDITABS) 10 MG dissolvable tablet, Take 10 mg by mouth daily, Disp: , Rfl:     Social History     Tobacco Use    Smoking status: Never Smoker    Smokeless tobacco: Never Used   Substance Use Topics    Alcohol use: Yes     Comment: once a week       Review of Systems:   Focused review of systems was performed, and negative as pertinent to diagnosis, except as stated in HPI. Physical Exam  Constitutional:       Appearance: Normal appearance. Pulmonary:      Effort: Pulmonary effort is normal.   Neurological:      Mental Status: He is alert. Psychiatric:         Attention and Perception: Attention and perception normal.         Mood and Affect: Mood and affect normal.         Patient's current physical status, medications, medical history, and HPI have been reviewed and updated as appropriate on this date: 09/23/21    Risk/Benefit(s): The risks, benefits, alternatives, and potential complications have been discussed with the patient/family and informed consent has been obtained for the procedure/sedation.     Diagnosis:   spondylosis      Plan: ta Roger MD Susi Holt is a 51 year old female who is here for follow up of her multiple medical problems including high blood pressure. In general she has been feeling well. In addition to her chronic medical problems, she has no complaints.     Hypertension: She feels her hypertension has been well controlled. She  Has been taking her medication regularly. There have not been any side effects. She complains of no hypertensive related symptoms and denies none.   Diet/exercise: she reports she is exercising and  is watching salt in diet.    Severe morbid obesity Body mass index is 53.26 kg/m².   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   B12 inj  ? Dose  Calcium pyruvate,   BP monitoring  Keto diet      The following portions of the patient's history were reviewed and updated as appropriate: Current medications.    History modules in Epic reviewed.     EXAMINATION:  VITALS:  Blood pressure 138/79, pulse 68, temperature 98.1 °F (36.7 °C), temperature source Tympanic, weight (!) 145 kg (319 lb 10.7 oz), last menstrual period 09/21/2009.  General appearance: 51 year old woman no acute distress, elevated bmi.   Lungs: restful breathing effort noted, Percussion normal. Good diaphragmatic excursion. Lungs clear  Heart:PMI normal. No lifts, heaves, or thrills. RRR.   Extremities: No edema, deformity, ulcers, callosities, or calf tenderness.  Pedal pulses good.     The following labs were reviewed:  Lab Services on 09/20/2021   Component Date Value Ref Range Status   • Fasting Status 09/20/2021 16  >0 - 999 Hours Final   • Sodium 09/20/2021 141  135 - 145 mmol/L Final   • Potassium 09/20/2021 3.9  3.4 - 5.1 mmol/L Final   • Chloride 09/20/2021 107  98 - 107 mmol/L Final   • Carbon Dioxide 09/20/2021 24  21 - 32 mmol/L Final   • Anion Gap 09/20/2021 14  10 - 20 mmol/L Final   • Glucose 09/20/2021 85  65 - 99 mg/dL Final   • BUN 09/20/2021 11  6 - 20 mg/dL Final   • Creatinine 09/20/2021 0.68  0.51 - 0.95  mg/dL Final   • Glomerular Filtration Rate 09/20/2021 >90  >=60 Final    eGFR results = or >60 mL/min/1.73m2 = Normal kidney function. Estimated GFR calculated using the 2009 CKD-EPI creatinine equation.     • BUN/ Creatinine Ratio 09/20/2021 16  7 - 25 Final   • Calcium 09/20/2021 8.9  8.4 - 10.2 mg/dL Final   • Bilirubin, Total 09/20/2021 0.3  0.2 - 1.0 mg/dL Final   • GOT/AST 09/20/2021 12  <=37 Units/L Final   • GPT/ALT 09/20/2021 25  <64 Units/L Final   • Alkaline Phosphatase 09/20/2021 95  45 - 117 Units/L Final   • Albumin 09/20/2021 3.5* 3.6 - 5.1 g/dL Final   • Protein, Total 09/20/2021 6.7  6.4 - 8.2 g/dL Final   • Globulin 09/20/2021 3.2  2.0 - 4.0 g/dL Final   • A/G Ratio 09/20/2021 1.1  1.0 - 2.4 Final   • Hemoglobin A1C 09/20/2021 5.7* 4.5 - 5.6 % Final      Diabetic Screening  Non Diabetic:             <5.7%  Increased Risk:           5.7-6.4%  Diagnostic For Diabetes:  >6.4%    Diabetic Control  A1C%       eAG mg/dL  6.0            126  6.5            140  7.0            154  7.5            169  8.0            183  8.5            197  9.0            212  9.5            226  10.0           240   • TSH 09/20/2021 1.369  0.350 - 5.000 mcUnits/mL Final   • Fasting Status 09/20/2021 16  >0 - 999 Hours Final   • Cholesterol 09/20/2021 200* <=199 mg/dL Final    Desirable         <200  Borderline High   200 to 239  High              >=240   • Triglycerides 09/20/2021 100  <=149 mg/dL Final    Normal            <150  Borderline High   150 to 199  High              200 to 499  Very High         >=500   • HDL 09/20/2021 61  >=50 mg/dL Final    Low              <40  Borderline Low   40 to 49  Near Optimal     50 to 59  Optimal          >=60   • LDL 09/20/2021 119  <=129 mg/dL Final    OPTIMAL           <100  NEAR OPTIMAL      100 to 129  BORDERLINE HIGH   130 to 159  HIGH              160 to 189  VERY HIGH         >=190   • Non-HDL Cholesterol 09/20/2021 139  mg/dL Final    Therapeutic Target:  CHD and risk  equivalents  <130  Multiple risk factors     <160  0 to 1 risk factor        <190   • Cholesterol/ HDL Ratio 09/20/2021 3.3  <=4.4 Final   • Microalbumin, Urine 09/20/2021 2.37  mg/dL Final   • Creatinine, Urine 09/20/2021 120.00  mg/dL Final   • Microalbumin/ Creatinine Ratio 09/20/2021 19.8  <30.0 mg/g Final      ASSESSMENT:  1. Primary hypertension    2. Morbid obesity with BMI of 50.0-59.9, adult (CMS/HCC)    3. Mild intermittent asthma without complication      See Patient Instructions for additional topics discussed during our visit.   Increase physical activity.     PLAN:  > Hypertension: reviewed medication regimen with the patient    >  Follow up in 11, sooner as needed.     Letter for [facility]

## 2021-10-20 ENCOUNTER — APPOINTMENT (OUTPATIENT)
Dept: PHYSICAL THERAPY | Facility: CLINIC | Age: 31
End: 2021-10-20
Payer: COMMERCIAL

## 2021-10-20 ENCOUNTER — HOSPITAL ENCOUNTER (OUTPATIENT)
Dept: PHYSICAL THERAPY | Facility: CLINIC | Age: 31
Setting detail: THERAPIES SERIES
Discharge: HOME | End: 2021-10-20
Payer: COMMERCIAL

## 2021-10-20 PROCEDURE — 97110 THERAPEUTIC EXERCISES: CPT

## 2021-10-21 ENCOUNTER — HOSPITAL ENCOUNTER (OUTPATIENT)
Dept: PAIN MANAGEMENT | Facility: CLINIC | Age: 31
Discharge: HOME | End: 2021-10-21
Payer: COMMERCIAL

## 2021-10-21 VITALS
HEART RATE: 73 BPM | DIASTOLIC BLOOD PRESSURE: 74 MMHG | TEMPERATURE: 97.6 F | BODY MASS INDEX: 38.07 KG/M2 | RESPIRATION RATE: 9 BRPM | OXYGEN SATURATION: 95 % | HEIGHT: 64 IN | WEIGHT: 223 LBS | SYSTOLIC BLOOD PRESSURE: 119 MMHG

## 2021-10-21 VITALS
RESPIRATION RATE: 9 BRPM | OXYGEN SATURATION: 94 % | DIASTOLIC BLOOD PRESSURE: 86 MMHG | SYSTOLIC BLOOD PRESSURE: 121 MMHG | HEART RATE: 61 BPM

## 2021-10-21 DIAGNOSIS — R52 PAIN MANAGEMENT: ICD-10-CM

## 2021-10-21 LAB — HCG, PREGNANCY URINE (POC): NEGATIVE

## 2021-10-21 PROCEDURE — 2580000003 HC RX 258: Performed by: PAIN MEDICINE

## 2021-10-21 PROCEDURE — 64635 DESTROY LUMB/SAC FACET JNT: CPT | Performed by: PAIN MEDICINE

## 2021-10-21 PROCEDURE — 64635 DESTROY LUMB/SAC FACET JNT: CPT

## 2021-10-21 PROCEDURE — 2500000003 HC RX 250 WO HCPCS: Performed by: PAIN MEDICINE

## 2021-10-21 PROCEDURE — 64636 DESTROY L/S FACET JNT ADDL: CPT | Performed by: PAIN MEDICINE

## 2021-10-21 PROCEDURE — 6360000002 HC RX W HCPCS: Performed by: PAIN MEDICINE

## 2021-10-21 PROCEDURE — 81025 URINE PREGNANCY TEST: CPT

## 2021-10-21 PROCEDURE — 64636 DESTROY L/S FACET JNT ADDL: CPT

## 2021-10-21 RX ORDER — FENTANYL CITRATE 50 UG/ML
INJECTION, SOLUTION INTRAMUSCULAR; INTRAVENOUS
Status: COMPLETED | OUTPATIENT
Start: 2021-10-21 | End: 2021-10-21

## 2021-10-21 RX ORDER — LIDOCAINE HYDROCHLORIDE 10 MG/ML
INJECTION, SOLUTION EPIDURAL; INFILTRATION; INTRACAUDAL; PERINEURAL
Status: COMPLETED | OUTPATIENT
Start: 2021-10-21 | End: 2021-10-21

## 2021-10-21 RX ORDER — BUPIVACAINE HYDROCHLORIDE 2.5 MG/ML
INJECTION, SOLUTION EPIDURAL; INFILTRATION; INTRACAUDAL
Status: COMPLETED | OUTPATIENT
Start: 2021-10-21 | End: 2021-10-21

## 2021-10-21 RX ADMIN — WATER 3 ML: 1 INJECTION INTRAMUSCULAR; INTRAVENOUS; SUBCUTANEOUS at 08:17

## 2021-10-21 RX ADMIN — FENTANYL CITRATE 100 MCG: 50 INJECTION INTRAMUSCULAR; INTRAVENOUS at 08:17

## 2021-10-21 RX ADMIN — BUPIVACAINE HYDROCHLORIDE 5 ML: 2.5 INJECTION, SOLUTION EPIDURAL; INFILTRATION; INTRACAUDAL; PERINEURAL at 08:18

## 2021-10-21 RX ADMIN — LIDOCAINE HYDROCHLORIDE 3 ML: 10 INJECTION, SOLUTION EPIDURAL; INFILTRATION; INTRACAUDAL at 08:17

## 2021-10-21 ASSESSMENT — PAIN DESCRIPTION - ONSET: ONSET: AWAKENED FROM SLEEP

## 2021-10-21 ASSESSMENT — PAIN DESCRIPTION - ORIENTATION: ORIENTATION: LEFT;LOWER

## 2021-10-21 ASSESSMENT — PAIN DESCRIPTION - PAIN TYPE: TYPE: CHRONIC PAIN

## 2021-10-21 ASSESSMENT — PAIN DESCRIPTION - DESCRIPTORS: DESCRIPTORS: ACHING;STABBING

## 2021-10-21 ASSESSMENT — PAIN DESCRIPTION - FREQUENCY: FREQUENCY: CONTINUOUS

## 2021-10-21 ASSESSMENT — PAIN SCALES - GENERAL: PAINLEVEL_OUTOF10: 4

## 2021-10-21 ASSESSMENT — PAIN - FUNCTIONAL ASSESSMENT
PAIN_FUNCTIONAL_ASSESSMENT: PREVENTS OR INTERFERES SOME ACTIVE ACTIVITIES AND ADLS
PAIN_FUNCTIONAL_ASSESSMENT: 0-10

## 2021-10-21 ASSESSMENT — PAIN DESCRIPTION - LOCATION: LOCATION: BACK

## 2021-10-21 ASSESSMENT — PAIN DESCRIPTION - PROGRESSION: CLINICAL_PROGRESSION: GRADUALLY IMPROVING

## 2021-10-21 NOTE — H&P
Pain Pre-Op H&P Note    Payton Obrien MD    HPI: Kb Akbar  presents with back pain. Past Medical History:   Diagnosis Date    Allergic rhinitis     Dermatitis     Hypoglycemia        Past Surgical History:   Procedure Laterality Date    PAIN MANAGEMENT PROCEDURE  Septemeber 2021    WISDOM TOOTH EXTRACTION         No family history on file. Allergies   Allergen Reactions    Valium Other (See Comments)     Hallucinations           Current Outpatient Medications:     Norgestim-Eth Estrad Triphasic 0.18/0.215/0.25 MG-35 MCG TABS, TAKE 1 TABLET DAILY, Disp: 84 tablet, Rfl: 3    citalopram (CELEXA) 20 MG tablet, Take 1 tablet by mouth daily, Disp: 90 tablet, Rfl: 3    loratadine (CLARITIN REDITABS) 10 MG dissolvable tablet, Take 10 mg by mouth daily, Disp: , Rfl:     Social History     Tobacco Use    Smoking status: Never Smoker    Smokeless tobacco: Never Used   Substance Use Topics    Alcohol use: Yes     Comment: once a week       Review of Systems:   Focused review of systems was performed, and negative as pertinent to diagnosis, except as stated in HPI. Physical Exam  Constitutional:       Appearance: Normal appearance. Pulmonary:      Effort: Pulmonary effort is normal.   Neurological:      Mental Status: He is alert. Psychiatric:         Attention and Perception: Attention and perception normal.         Mood and Affect: Mood and affect normal.         Patient's current physical status, medications, medical history, and HPI have been reviewed and updated as appropriate on this date: 10/21/21    Risk/Benefit(s): The risks, benefits, alternatives, and potential complications have been discussed with the patient/family and informed consent has been obtained for the procedure/sedation.     Diagnosis:   spondylosis      Plan: rfa        Payton Obrien MD

## 2021-10-21 NOTE — FLOWSHEET NOTE
[] [facility]) [facility] &  Therapy  [address]  [phone]  F: [phone] [] [address]  [facility]   [address]  P: [phone]  F: [phone] [x] [address] &  Therapy  [address]  P: [phone]  F: [phone] [] [address]  P: [phone]  F: [phone] [] [address]  [facility]   [address]   [place]: [phone]  F: [phone]      Physical Therapy Daily Treatment Note    Date:  10/6/2021  Patient Name:  Michael Brown    :  1990  MRN: [id number]   MRN: [id number]  Physician: Ruth Deshpande MD             Insurance: Shelley Fred choice [employer]  Auth after 40  Medical Diagnosis: M51.27 (ICD-10-CM) - Herniated nucleus pulposus, L5-S1                  Rehab Codes: M54.5  Onset Date: 2020             Next 's appt.: 21  Visit# / total visits:      Cancels/No Shows: 0/0    Subjective:    Pain:  [x] Yes  [] No  Pain Rating: (0-10 scale) 1/10  Pain altered Tx:  [] No  [] Yes  Action:  Comments: Patient reports that she had a nerve ablastin procedure last week on R side and is feeling better. She is experiencing mild discomfort on L side      Objective:  Modalities:    Precautions:  Exercises:BOLD completed today  Exercise Reps/ Time Weight/ Level Comments   Thoracic stabilization exercises   Multi positions                                         Treatment Charges: Mins Units   []  Modalities     [x]  Ther Exercise 50 3   []  Manual Therapy     []  Ther Activities     []  Aquatics     []  Vasocompression     []  Other     Total Treatment time 50 3       Assessment: [] Progressing toward goals. [] No change.      [x] Other: Patient demonstrates lateral thoracic shifting and low lumbar extension with transitions. Focused exercises to improve stabilization without compensations. Tactile and VC's use to improve proprioception and stability       STG: (to be met in 6 treatments)  1. ? Pain: Patient to report the ability to transfer out of bed with 310 pain and with IND-MET  2. ? ROM:  3. ? Strength:  4. ? Function:  5. Patient to be independent with home exercise program as demonstrated by performance with correct form without cues. -MET  6. Demonstrate Knowledge of fall prevention-MET  LTG: (to be met in 12 treatments)  1. Patient to report 0/10 pain with prolong sitting turning and getting out of bed  2. Patient to demonstrate the ability to maintain neutral alignment and mobility when completing transfers  3. Patient to report 0% deficit on Modified Oswestry         Patient goals: To have less pain       Pt. Education:  [x] Yes  [] No  [] Reviewed Prior HEP/Ed  Method of Education: [] Verbal  [] Demo  [] Written  Comprehension of Education:  [] Verbalizes understanding. [] Demonstrates understanding. [] Needs review. [] Demonstrates/verbalizes HEP/Ed previously given. Plan: [x] Continue current frequency toward long and short term goals.           Time In:8am         Time Out: 855am    Electronically signed by:  Robby Singh PT

## 2021-10-21 NOTE — OP NOTE
Lumbar Radiofrequency Ablation:  SURGEON: hCaz Adame MD    PRE-OP DIAGNOSIS: M47.817 (lumbosacral spondylosis), M54.5 (low back pain)    POST-OP DIAGNOSIS: Same. PROCEDURE PERFORMED: Radiofrequency Ablation Medial Branch Nerve at Left L4 - 5 and L5 - S1 facet joint(s). HISTORY AND INDICATIONS: Satisfactory response with previous RFA/ medial branch blocks at the indicated levels. Recurrence of painful symptoms attributed to the above diagnosis. The planned treatment is medically necessary to relieve pain, restore function and or reduce reliance on pain medication. EBL: minimal    CONSENT: Patient has undergone the educational process with this procedure, is aware and fully understands the risks involved: potential damage to any and all body organs including possible bleeding, infection, nerve injury, paralysis, allergic reaction and headache. Patient also understands that the procedure will be undertaken in a safe, controlled and monitored setting. Patient recognizes that the benefits may include relief from pain and reduction in the oral use of medications. Patient agreed to proceed. The patient was counseled at length about the risks of yoel Covid-19 during their perioperative period and any recovery window from their procedure. The patient was made aware that yoel Covid-19  may worsen their prognosis for recovering from their procedure  and lend to a higher morbidity and/or mortality risk. All material risks, benefits, and reasonable alternatives including postponing the procedure were discussed. The patient does wish to proceed with the procedure at this time. MONITORS INSTITUTED INCLUDE but not limited to:   Pulse Oximetry  Non-invasive blood pressure monitoring    PROCEDURE NOTE: The patient was taken to the procedure room and placed prone with the appropriate padding and positioning to assure patient comfort and physician access to the procedure site.  Time out was performed prior to starting the procedure. Fluoroscopic evaluation was utilized to target the appropriate treatment areas. The skin was prepped with antiseptic solution and draped sterilely. Then 0.5% lidocaine was used to anesthetize the skin and subcutaneous tissue. Under fluoroscopic guidance a 20 gauge x 10cm x 10mm active tip was advanced to the medial branch nerve at the indicated levels below to innervate the following facet joints Left L4 - 5 and L5 - S1 . The needles were placed sequentially. Position confirmed radiographically with the fluoroscope. Active tip corresponding at the base of the superior articulating process and/or sacral ala for the lumbar RFA. Motor stimulation checked at 2hz up to 3V and confirmed negative for radicular stimulation. After confirmation of the needle placement the patient received 1 ml of 0.25% marcaine to provide anesthesia. Radiofrequency was delivered to the lumbar region at 80 degrees for a limit of 90 seconds in length with no ill effect. The patient tolerated the procedure well and was transported to the recovery room where the patient was monitored with no complications and with stable vital signs. Patient was discharged with appropriate written discharge instructions. Follow-up discussed.       Luis Miguel Capone MD

## 2021-10-29 ENCOUNTER — HOSPITAL ENCOUNTER (OUTPATIENT)
Dept: PHYSICAL THERAPY | Facility: CLINIC | Age: 31
Setting detail: THERAPIES SERIES
Discharge: HOME | End: 2021-10-29
Payer: COMMERCIAL

## 2021-10-29 PROCEDURE — 97110 THERAPEUTIC EXERCISES: CPT

## 2021-10-29 NOTE — FLOWSHEET NOTE
[] [facility]  Outpatient Rehabilitation &  Therapy  [address]  [phone]  F: [phone] [] [address]  [facility]   [address]  P: [phone]  F: [phone] [x] [address] &  Therapy  [address]  P: [phone]  F: [phone] [] [address]  P: [phone]  F: [phone] [] [address]  [facility]   [address]   [place]: [phone]  F: [phone]      Physical Therapy Daily Treatment Note    Date:    Patient Name:  Shannon Avila    :  1990  MRN: [id number]   MRN: [id number]  Physician: Chevy Muñoz MD             Insurance: Olive Amel choice [facility]  Auth after   Medical Diagnosis: M51.27 (ICD-10-CM) - Herniated nucleus pulposus, L5-S1                  Rehab Codes: M54.5  Onset Date: 2020             Next 's appt.: 21  Visit# / total visits:      Cancels/No Shows: 0/0    Subjective:    Pain:  [x] Yes  [] No  Pain Rating: (0-10 scale) 1/10  Pain altered Tx:  [] No  [] Yes  Action:  Comments: Patient received the second nerve ablastin  injection and reports feeling much better      Objective:  Modalities:    Precautions:  Exercises:BOLD completed today  Exercise Reps/ Time Weight/ Level Comments   [place] modified      Hip [place]  AC green    Diaphragmatic breathing      Lift and lower with leg lift      Side leg circles  Red AC    Side leg lifts  Red AC    Side Bend and stretch  Red AC    Swimming prep        Treatment Charges: Mins Units   []  Modalities     [x]  Ther Exercise 51 3   []  Manual Therapy     []  Ther Activities     []  Aquatics     []  Vasocompression     []  Other     Total Treatment time 51 3       Assessment: [] Progressing toward goals. [] No change.      [x] Other: Progressed HEP to lower and lift and continued with abdominal stabilization. VC\"s used to I/S in diaphragmatic breathing coordination    STG: (to be met in 6 treatments)  1. ? Pain: Patient to report the ability to transfer out of bed with 310 pain and with IND-MET  2. ? ROM:  3. ? Strength:  4. ? Function:  5. Patient to be independent with home exercise program as demonstrated by performance with correct form without cues. -MET  6. Demonstrate Knowledge of fall prevention-MET  LTG: (to be met in 12 treatments)  1. Patient to report 0/10 pain with prolong sitting turning and getting out of bed  2. Patient to demonstrate the ability to maintain neutral alignment and mobility when completing transfers  3. Patient to report 0% deficit on Modified Oswestry         Patient goals: To have less pain       Pt. Education:  [x] Yes  [] No  [] Reviewed Prior HEP/Ed  Method of Education: [] Verbal  [] Demo  [] Written  Comprehension of Education:  [] Verbalizes understanding. [] Demonstrates understanding. [] Needs review. [] Demonstrates/verbalizes HEP/Ed previously given. Plan: [x] Continue current frequency toward long and short term goals.           Time In:10am         Time Out: 1055am    Electronically signed by:  Kati Hu PT

## 2021-11-05 ENCOUNTER — HOSPITAL ENCOUNTER (OUTPATIENT)
Dept: PHYSICAL THERAPY | Facility: CLINIC | Age: 31
Setting detail: THERAPIES SERIES
Discharge: HOME | End: 2021-11-05
Payer: COMMERCIAL

## 2021-11-05 PROCEDURE — 97110 THERAPEUTIC EXERCISES: CPT

## 2021-11-08 ENCOUNTER — TELEMEDICINE (OUTPATIENT)
Dept: PAIN MANAGEMENT | Age: 31
End: 2021-11-08
Payer: COMMERCIAL

## 2021-11-08 DIAGNOSIS — M54.50 CHRONIC BILATERAL LOW BACK PAIN, UNSPECIFIED WHETHER SCIATICA PRESENT: ICD-10-CM

## 2021-11-08 DIAGNOSIS — G89.29 CHRONIC BILATERAL LOW BACK PAIN, UNSPECIFIED WHETHER SCIATICA PRESENT: ICD-10-CM

## 2021-11-08 DIAGNOSIS — M47.817 LUMBOSACRAL SPONDYLOSIS WITHOUT MYELOPATHY: Primary | ICD-10-CM

## 2021-11-08 PROCEDURE — 99212 OFFICE O/P EST SF 10 MIN: CPT | Performed by: PAIN MEDICINE

## 2021-11-08 ASSESSMENT — ENCOUNTER SYMPTOMS: BACK PAIN: 1

## 2021-11-08 NOTE — PROGRESS NOTES
HPI:     Back Pain  This is a chronic problem. The current episode started more than 1 year ago. The problem occurs constantly. The problem has been gradually improving since onset. The pain is present in the lumbar spine. The quality of the pain is described as aching. The pain is mild. Recent lumbar RFA with more than 90% improvement. MRI with degenerative changes and disc bulging at L5-S1. Pain does not radiate down the legs. She has seen the surgical team.  Nothing surgical at this time. Continues PT. Patient denies any new neurological symptoms. Nobowel or bladder incontinence, no weakness, and no falling. Review of OARRS does not show any aberrant prescription behavior. Past Medical History:   Diagnosis Date    Allergic rhinitis     Dermatitis     Hypoglycemia        Past Surgical History:   Procedure Laterality Date    PAIN MANAGEMENT PROCEDURE  Septemeber 2021    WISDOM TOOTH EXTRACTION         Allergies   Allergen Reactions    Valium Other (See Comments)     Hallucinations           Current Outpatient Medications:     Norgestim-Eth Estrad Triphasic 0.18/0.215/0.25 MG-35 MCG TABS, TAKE 1 TABLET DAILY, Disp: 84 tablet, Rfl: 3    citalopram (CELEXA) 20 MG tablet, Take 1 tablet by mouth daily, Disp: 90 tablet, Rfl: 3    loratadine (CLARITIN REDITABS) 10 MG dissolvable tablet, Take 10 mg by mouth daily, Disp: , Rfl:     No family history on file.     Social History     Socioeconomic History    Marital status:      Spouse name: Not on file    Number of children: Not on file    Years of education: Not on file    Highest education level: Not on file   Occupational History    Not on file   Tobacco Use    Smoking status: Never Smoker    Smokeless tobacco: Never Used   Vaping Use    Vaping Use: Never used   Substance and Sexual Activity    Alcohol use: Yes     Comment: once a week    Drug use: Never    Sexual activity: Yes     Partners: Male   Other Topics Concern    Not on file   Social History Narrative    Not on file     Social Determinants of Health     Financial Resource Strain: Low Risk     Difficulty of Paying Living Expenses: Not hard at all   Food Insecurity: No Food Insecurity    Worried About Running Out of Food in the Last Year: Never true    [address] in the Last Year: Never true   Transportation Needs:     Lack of Transportation (Medical): Not on file    Lack of Transportation (Non-Medical): Not on file   Physical Activity:     Days of Exercise per Week: Not on file    Minutes of Exercise per Session: Not on file   Stress:     Feeling of Stress : Not on file   Social Connections:     Frequency of Communication with Friends and Family: Not on file    Frequency of Social Gatherings with Friends and Family: Not on file    Attends Confucianism Services: Not on file    Active Member of [address] [place] [address] or Organizations: Not on file    Attends Club or Organization Meetings: Not on file    Marital Status: Not on file   Intimate Partner Violence:     Fear of Current or Ex-Partner: Not on file    Emotionally Abused: Not on file    Physically Abused: Not on file    Sexually Abused: Not on file   Housing Stability:     Unable to Pay for Housing in the Last Year: Not on file    Number of [place] in the Last Year: Not on file    Unstable Housing in the Last Year: Not on file       Review of Systems:  Review of Systems   Musculoskeletal: Positive for back pain. Physical Exam:      Physical Exam  Constitutional:       Appearance: Normal appearance. Pulmonary:      Effort: Pulmonary effort is normal.   Neurological:      Mental Status: She is alert. Psychiatric:         Attention and Perception: Attention and perception normal.         Mood and Affect: Mood and affect normal.         Record/Diagnostics Review:    As above, I did review the imaging      Assessment:  1. Lumbosacral spondylosis without myelopathy    2.  Chronic bilateral low back pain, unspecified whether sciatica present        Treatment Plan:  DISCUSSION: Treatment options discussed with patient and all questions answered to patient's satisfaction. OARRS Review: Reviewed and acceptable for medications prescribed. TREATMENT OPTIONS:     Discussed the importance of continued physical therapy and exercise program as well. Did very well with RFA, repeat as needed. Follow-up visit in 3 months  Jami Chamorro M.D. I have reviewed the chief complaint and history of present illness (including ROS and PFSH) and vital documentation by my staff and I agree with their documentation and have added where applicable. Solomonchar Minden, was evaluated through a synchronous (real-time) audio-video encounter. The patient (or guardian if applicable) is aware that this is a billable service. Verbal consent to proceed has been obtained within the past 12 months. The visit was conducted pursuant to the emergency declaration under the [facility], [address] [place] [address] authority and the [employer] General Act. Patient identification was verified, and a caregiver was present when appropriate. The patient was located in a state where the provider was credentialed to provide care. Total time spent for this encounter: Not billed by time    --Danie Ohara MD on 11/8/2021 at 8:08 AM    An electronic signature was used to authenticate this note.

## 2021-11-12 ENCOUNTER — APPOINTMENT (OUTPATIENT)
Dept: PHYSICAL THERAPY | Facility: CLINIC | Age: 31
End: 2021-11-12
Payer: COMMERCIAL

## 2021-12-01 ENCOUNTER — HOSPITAL ENCOUNTER (OUTPATIENT)
Dept: PHYSICAL THERAPY | Facility: CLINIC | Age: 31
Setting detail: THERAPIES SERIES
Discharge: HOME | End: 2021-12-01
Payer: COMMERCIAL

## 2021-12-01 PROCEDURE — 97110 THERAPEUTIC EXERCISES: CPT

## 2021-12-01 NOTE — FLOWSHEET NOTE
[] [facility]  [address]  [phone]  F: [phone] [] [address]  [facility]\"" 36   [address]  P: [phone]  F: [phone] [x] [address] &  Therapy  [address]  P: [phone]  F: [phone] [] [address]  P: [phone]  F: [phone] [] [address]  [facility]   [address]   [place]: [phone]  F: [phone]      Physical Therapy Daily Treatment Note    Date:  2021  Patient Name:  Felix Jiménez    :  1990  MRN: [id number]   MRN: [id number]  Physician: Sally Krishna MD             Insurance: Sigifredo Picket choice [facility]  Auth after   Medical Diagnosis: M51.27 (ICD-10-CM) - Herniated nucleus pulposus, L5-S1                  Rehab Codes: M54.5  Onset Date: 2020             Next 's appt.: 21  Visit# / total visits:      Cancels/No Shows: 0/0    Subjective:    Pain:  [] Yes  [x] No  Pain Rating: (0-10 scale)  0/10  Pain altered Tx:  [] No  [] Yes  Action:  Comments: Pt stated has been compliant in her HEP,       Objective:  Modalities:    Precautions:  Exercises:  Exercise Reps/ Time Weight/ Level  Comments   Warm up       PPT  10x10\"  x           Lift and lower with leg lift 10x  x    Supine abduction 2x10 OTB x    Jessy hip abd 10x  x    Side leg circles 10x  x    Supine leg circles PPT 10x  x Added    Side leg lifts 10x  x Pillow between knees   Side Bend and stretch  Red AC     Swimming prep 2x5  x 2 pillows    Modified plank elbow tap       Low bridge with PPT and glut sqeeze  10x3\" OTB x    Modified quadruped on red SB w/PPT 2x5  x Roll SB forward and back  added    SB lifts with PPT  2x5 Red SB x Stands against wall for tactile cuing (with towel) lifts up/L/R     Treatment Charges: Mins Units   []  Modalities     [x]  Ther Exercise 45 3   [x]  Manual Therapy 5 0   []  Ther Activities     []  Aquatics     []  Vasocompression     []  Other     Total Treatment time 50 3       Assessment: [x] Progressing toward goals. Pt demo most difficulty in MELINA abd and standing ball lifts in maintaining TA contraction. Noted min compensation in swimmers d/t poor glut activation, tactile cues required. Pt demo good TA control and pelvis alignment in quadruped ball roll with no report in pain or discomfort. Attempted modified side lying oblique taps but discontinued to excessive compensation and difficulty. Added 5' MFR to ina gluts and piriformis at end of tx d/t minimal increase in muscle tension reported with good response post.     [] No change. [x] Other: Continued with improving gluteal activation and pelvic control. Patient able to correct alignment deficit with VC's and maintain throughout reps    STG: (to be met in 6 treatments)  1. ? Pain: Patient to report the ability to transfer out of bed with 310 pain and with IND-MET  2. ? ROM:  3. ? Strength:  4. ? Function:  5. Patient to be independent with home exercise program as demonstrated by performance with correct form without cues. -MET  6. Demonstrate Knowledge of fall prevention-MET  LTG: (to be met in 12 treatments)  1. Patient to report 0/10 pain with prolong sitting turning and getting out of bed  2. Patient to demonstrate the ability to maintain neutral alignment and mobility when completing transfers  3. Patient to report 0% deficit on Modified Oswestry         Patient goals: To have less pain       Pt. Education:  [x] Yes  [] No  [] Reviewed Prior HEP/Ed  Method of Education: [] Verbal  [] Demo  [] Written  Comprehension of Education:  [] Verbalizes understanding. [] Demonstrates understanding. [] Needs review. [] Demonstrates/verbalizes HEP/Ed previously given.      Plan: [x] Continue current frequency toward long and short term goals.           Time In:8:32am         Time Out: 9:30am    Electronically signed by:  Meño Lazar PTA

## 2021-12-14 ENCOUNTER — HOSPITAL ENCOUNTER (OUTPATIENT)
Dept: PHYSICAL THERAPY | Facility: CLINIC | Age: 31
Setting detail: THERAPIES SERIES
Discharge: HOME | End: 2021-12-14
Payer: COMMERCIAL

## 2021-12-14 PROCEDURE — 97110 THERAPEUTIC EXERCISES: CPT

## 2021-12-14 NOTE — PROGRESS NOTES
9am    Assessment:  STG: (to be met in 6 treatments)  1. ? Pain: Patient to report the ability to transfer out of bed with 310 pain and with IND-MET  2. ? ROM:  3. ? Strength:  4. ? Function:  5. Patient to be independent with home exercise program as demonstrated by performance with correct form without cues. -MET  6. Demonstrate Knowledge of fall prevention-MET  LTG: (to be met in 12 treatments)  1. Patient to report 0/10 pain with prolong sitting turning and getting out of bed--MET  2. Patient to demonstrate the ability to maintain neutral alignment and mobility when completing transfer-MET  3. Patient to report 0% deficit on Modified [facility]      Patient goals: To have less pain-MET     Treatment Plan:  [x] Therapeutic Exercise   98406  [] Iontophoresis: 4 mg/mL Dexamethasone Sodium Phosphate  mAmin  05591   [] Therapeutic Activity  73900 [] Vasopneumatic cold with compression  10114    [] Gait Training   79122 [] Ultrasound   [id number]   [x] Neuromuscular Re-education  63855 [] Electrical Stimulation Unattended  10465   [x] Manual Therapy  31998 [] Electrical Stimulation Attended  34355   [x] Instruction in HEP  [] Lumbar/Cervical Traction  [id number]     Patient Status:     [] Continue per initial plan of care. [] Additional visits necessary. [x] Other:Patient has progressed well and demonstrates and reports good understanding of HEP, progressions and modifications. Patient to continue with HEP and will follow up with physician as needed     Electronically signed by Yesica Johnson PT on 12/14/2021 at 8:04 AM    If you have any questions or concerns, please don't hesitate to call. Thank you for your referral.    Physician Signature:________________________________Date:__________________  By signing above or cosigning this note, I have reviewed this plan of care and certify a need for medically necessary rehabilitation services.      *PLEASE SIGN ABOVE AND FAX BACK ALL PAGES*

## 2022-01-05 ENCOUNTER — OFFICE VISIT (OUTPATIENT)
Dept: NEUROSURGERY | Age: 32
End: 2022-01-05
Payer: COMMERCIAL

## 2022-01-05 VITALS
OXYGEN SATURATION: 98 % | DIASTOLIC BLOOD PRESSURE: 72 MMHG | SYSTOLIC BLOOD PRESSURE: 108 MMHG | HEART RATE: 81 BPM | HEIGHT: 64 IN | BODY MASS INDEX: 38.07 KG/M2 | WEIGHT: 223 LBS

## 2022-01-05 DIAGNOSIS — M47.816 LUMBAR SPONDYLOSIS: Primary | ICD-10-CM

## 2022-01-05 PROCEDURE — 99212 OFFICE O/P EST SF 10 MIN: CPT | Performed by: NURSE PRACTITIONER

## 2022-01-05 NOTE — PROGRESS NOTES
[address]  [facility] [address] [facility] [address]  Dept: [phone]    Patient:  Mari Duong  YOB: 1990  Date: 1/5/22    The patient is a 33 y.o. female who presents today for consult of the following problems:     Chief Complaint   Patient presents with    Follow-up         HPI:     Mari Duong is a 33 y.o. female who presents for follow up of low back pain. Has undergone physical therapy, and following with pain management. Recently underwent facet blocks that were very helpful, and subsequently underwent RFAs that have provided significant relief. Pain has significantly improved. Pain no averaging 1/10. Has been able to participate in daily activities, work. Very pleased with outcome. No numbness, tingling, saddle anesthesia, changes to bowels or bladder. Continues to do daily stretches, core strengthening. History:     Past Medical History:   Diagnosis Date    Allergic rhinitis     Dermatitis     Hypoglycemia      Past Surgical History:   Procedure Laterality Date    PAIN MANAGEMENT PROCEDURE  Septemeber 2021    WISDOM TOOTH EXTRACTION       History reviewed. No pertinent family history. Current Outpatient Medications on File Prior to Visit   Medication Sig Dispense Refill    Norgestim-Eth Estrad Triphasic 0.18/0.215/0.25 MG-35 MCG TABS TAKE 1 TABLET DAILY 84 tablet 3    citalopram (CELEXA) 20 MG tablet Take 1 tablet by mouth daily 90 tablet 3    loratadine (CLARITIN REDITABS) 10 MG dissolvable tablet Take 10 mg by mouth daily       No current facility-administered medications on file prior to visit.      Social History     Tobacco Use    Smoking status: Never Smoker    Smokeless tobacco: Never Used   Vaping Use    Vaping Use: Never used   Substance Use Topics    Alcohol use: Yes     Comment: once a week    Drug use: Never Allergies   Allergen Reactions    Valium Other (See Comments)     Hallucinations         Review of Systems  Constitutional: Negative for activity change and appetite change. HENT: Negative for ear pain and facial swelling. Eyes: Negative for discharge and itching. Respiratory: Negative for choking and chest tightness. Cardiovascular: Negative for chest pain and leg swelling. Gastrointestinal: Negative for nausea and abdominal pain. Endocrine: Negative for cold intolerance and heat intolerance. Genitourinary: Negative for frequency and flank pain. Musculoskeletal: Negative for myalgias and joint swelling. Skin: Negative for rash and wound. Allergic/Immunologic: Negative for environmental allergies and food allergies. Hematological: Negative for adenopathy. Does not bruise/bleed easily. Psychiatric/Behavioral: Negative for self-injury. The patient is not nervous/anxious. Physical Exam:      /72   Pulse 81   Ht 5' 4\" (1.626 m)   Wt 223 lb (101.2 kg)   SpO2 98%   BMI 38.28 kg/m²   Estimated body mass index is 38.28 kg/m² as calculated from the following:    Height as of this encounter: 5' 4\" (1.626 m). Weight as of this encounter: 223 lb (101.2 kg). General:  Pipo Soto is a 33y.o. year old female who appears her stated age. HEENT: Normocephalic atraumatic. Neck supple. Chest: regular rate; pulses equal  Abdomen: Soft nontender nondistended.   Ext: DP and PT pulses 2+, good cap refill  Neuro    Mentation  Appropriate affect  Registration intact  Orientation intact  3 item recall intact  Judgement intact to situation    Cranial Nerves:   Pupils equal and reactive to light  Extraocular motion intact  Face and shrug symmetric  Tongue midline  No dysarthria  v1-3 sensation symmetric, masseter tone symmetric  Hearing symmetric    Sensation: Intact    Motor  L deltoid 5/5; R deltoid 5/5  L biceps 5/5; R biceps 5/5  L triceps 5/5; R triceps 5/5  L wrist extension 5/5; R wrist extension 5/5  L intrinsics 5/5; R intrinsics 5/5     L iliopsoas 5/5 , R iliopsoas 5/5  L quadriceps 5/5; R quadriceps 5/5  L Dorsiflexion 5/5; R dorsiflexion 5/5  L Plantarflexion 5/5; R plantarflexion 5/5  L EHL 5/5; R EHL 5/5    Reflexes  L Brachioradialis 2+/4; R brachioradialis 2+/4  L Biceps 2+/4; R Biceps 2+/4  L Triceps 2+/4; R Triceps 2+/4  L Patellar 2+/4: R Patellar 2+/4  L Achilles 2+/4; R Achilles 2+/4    hoffmans L: neg  hoffmans R: neg  Clonus L: neg  Clonus R: neg  Babinski L: neg  Babinski R: neg    Studies Review:     No new images    Assessment and Plan:      1. Lumbar spondylosis          Plan: Patient doing very well following recent radiofrequency ablation. Recommend continuing daily stretching exercises. Can follow-up on an as-needed basis with any new, worsening symptoms. Follow-up with pain management as planned. Followup: Return if symptoms worsen or fail to improve. Prescriptions Ordered:  No orders of the defined types were placed in this encounter. Orders Placed:  No orders of the defined types were placed in this encounter. Electronically signed by BARRON Saravia CNP on 1/5/2022 at 8:59 AM    Please note that this chart was generated using voice recognition Dragon dictation software. Although every effort was made to ensure the accuracy of this automated transcription, some errors in transcription may have occurred.

## 2022-01-07 ENCOUNTER — OFFICE VISIT (OUTPATIENT)
Dept: FAMILY MEDICINE CLINIC | Age: 32
End: 2022-01-07
Payer: COMMERCIAL

## 2022-01-07 VITALS
DIASTOLIC BLOOD PRESSURE: 82 MMHG | SYSTOLIC BLOOD PRESSURE: 108 MMHG | TEMPERATURE: 97.8 F | BODY MASS INDEX: 39.48 KG/M2 | HEART RATE: 85 BPM | WEIGHT: 230 LBS

## 2022-01-07 DIAGNOSIS — M51.26 LUMBAR HERNIATED DISC: ICD-10-CM

## 2022-01-07 DIAGNOSIS — G47.9 SLEEP DISTURBANCE: ICD-10-CM

## 2022-01-07 DIAGNOSIS — G89.29 CHRONIC BILATERAL LOW BACK PAIN, UNSPECIFIED WHETHER SCIATICA PRESENT: Primary | ICD-10-CM

## 2022-01-07 DIAGNOSIS — M54.50 CHRONIC BILATERAL LOW BACK PAIN, UNSPECIFIED WHETHER SCIATICA PRESENT: Primary | ICD-10-CM

## 2022-01-07 DIAGNOSIS — R40.0 DAYTIME SOMNOLENCE: ICD-10-CM

## 2022-01-07 DIAGNOSIS — R19.8 GI SYMPTOMS: ICD-10-CM

## 2022-01-07 DIAGNOSIS — M47.816 LUMBAR SPONDYLOSIS: ICD-10-CM

## 2022-01-07 DIAGNOSIS — K90.0 CELIAC DISEASE: ICD-10-CM

## 2022-01-07 DIAGNOSIS — M51.36 LUMBAR DEGENERATIVE DISC DISEASE: ICD-10-CM

## 2022-01-07 PROCEDURE — 99214 OFFICE O/P EST MOD 30 MIN: CPT | Performed by: PEDIATRICS

## 2022-01-07 ASSESSMENT — ENCOUNTER SYMPTOMS
NAUSEA: 0
CONSTIPATION: 0
VOMITING: 0
SHORTNESS OF BREATH: 0
COUGH: 0
CHEST TIGHTNESS: 0
BACK PAIN: 1
WHEEZING: 0
SORE THROAT: 0
ABDOMINAL DISTENTION: 1
DIARRHEA: 1
ABDOMINAL PAIN: 1

## 2022-01-07 NOTE — PROGRESS NOTES
Harpreet Haskins (:  1990) is a 33 y.o. female,Established patient, here for evaluation of the following chief complaint(s): Insomnia and Other (Celiac Disease )         ASSESSMENT/PLAN:    1. Chronic bilateral low back pain, unspecified whether sciatica present  2. Lumbar degenerative disc disease  3. Lumbar spondylosis  4. Lumbar herniated disc  5. GI symptoms  -     Amandeep Wallace MD, Gastroenterology, [place]  6. Celiac disease  -     Aamndeep Wallace MD, Gastroenterology, [place]  7. Sleep disturbance  -     Baseline Diagnostic Sleep Study; Future  8. Daytime somnolence  -     Baseline Diagnostic Sleep Study; Future      Follow-up with neurosurgery as needed  Follow-up with pain management as needed  Continue daily exercise and daily stretching   Healthy diet and regular exercise encouraged  Weight reduction encouraged  Consult with GI regarding GI symptoms and positive celiac antibodies  Obtain sleep study  Continue same medications  Good sleep hygiene recommended  Call with concerns      Return for annual physical.         Subjective     HPI    Patient presents today for routine follow-up. She wanted to follow-up with me to update me on several things that been going on for some time. She does have a history of chronic low back pain secondary to lumbar degenerative disc disease, lumbar spondylosis and a lumbar herniated disc. She did see Dr. Ariel Garza who wanted to do surgery. She really did not want to go this route and got a second opinion with Dr. Serina Estrada. He ordered an MRI which showed the degenerative disc disease and an L5/S1 herniated disc. There is no surrounding damage and so he did send her to pain management. She saw Dr. Derek TALLEY who ordered physical therapy and acupuncture as well as radiofrequency ablation. She states that the RFA helped 100%.   She is no longer doing physical therapy as this just ended in December and she states that her back pain is dramatically improved. She has been working out and if she sticks with this her back does not bother her much at all. She is stretching daily and using a hyper volt daily. She was then diagnosed with celiac disease after she had worsening of her back pain in 2021. She was evaluated here and was tested for celiac disease after being questioned about her bowel habits. She did have some antibodies that were mildly elevated. She was told that she had celiac disease but was not referred to GI. She would like to see GI for confirmation. She does complain of having significant sleep issues. She does have a history of having some sleep issues and night terrors previously. She was then prescribed Celexa which did seem to help her problems. Unfortunately her sleep issues have returned and she reports that it takes hours for her to fall asleep despite trying to limit her phone time before bed. She states that she will just lay there and listen to her  snore. She has tried melatonin which has been helpful and she actually states that she cannot fall asleep without melatonin. She does have significant daytime somnolence and we discussed sleep apnea and she is concerned for this. She states that she will have some depression that would last for days at a time just because of her physical health. She will feel overwhelmed but overall states that she is doing okay. cmw        Review of Systems   Constitutional: Positive for fatigue. Negative for chills and fever. HENT: Negative for congestion, postnasal drip and sore throat. Eyes: Negative for visual disturbance. Respiratory: Negative for cough, chest tightness, shortness of breath and wheezing. Cardiovascular: Negative. Negative for chest pain, palpitations and leg swelling. Gastrointestinal: Positive for abdominal distention (with celiac), abdominal pain (with intake of celiac) and diarrhea (loose stools before limiting celiac).  Negative for constipation, nausea and vomiting. Genitourinary: Negative for decreased urine volume, difficulty urinating, dysuria, frequency and urgency. Musculoskeletal: Positive for back pain (intermittent). Negative for arthralgias and joint swelling. Skin: Negative for rash. Neurological: Negative for dizziness, seizures, weakness, light-headedness, numbness and headaches. Hematological: Negative for adenopathy. Does not bruise/bleed easily. Psychiatric/Behavioral: Positive for dysphoric mood (occasional) and sleep disturbance. Negative for agitation and decreased concentration. Objective      Physical Exam  Vitals and nursing note reviewed. Constitutional:       General: She is not in acute distress. Appearance: Normal appearance. She is well-developed. She is obese. She is not ill-appearing, toxic-appearing or diaphoretic. HENT:      Head: Normocephalic. Right Ear: Tympanic membrane, ear canal and external ear normal.      Left Ear: Tympanic membrane, ear canal and external ear normal.      Nose: Nose normal.      Mouth/Throat:      Mouth: Mucous membranes are moist.      Pharynx: No oropharyngeal exudate. Eyes:      General:         Right eye: No discharge. Left eye: No discharge. Extraocular Movements: Extraocular movements intact. Conjunctiva/sclera: Conjunctivae normal.      Pupils: Pupils are equal, round, and reactive to light. Neck:      Thyroid: No thyromegaly. Vascular: No JVD. Cardiovascular:      Rate and Rhythm: Normal rate and regular rhythm. Pulses: Normal pulses. Heart sounds: Normal heart sounds. No murmur heard. Pulmonary:      Effort: Pulmonary effort is normal.      Breath sounds: Normal breath sounds. No wheezing or rales. Abdominal:      General: Bowel sounds are normal.      Palpations: Abdomen is soft. There is no mass. Tenderness: There is no abdominal tenderness.  There is no right CVA tenderness or left CVA tenderness. Musculoskeletal:      Cervical back: Normal range of motion and neck supple. Right lower leg: No edema. Left lower leg: No edema. Lymphadenopathy:      Cervical: No cervical adenopathy. Skin:     General: Skin is warm. Capillary Refill: Capillary refill takes less than 2 seconds. Findings: No rash. Neurological:      General: No focal deficit present. Mental Status: She is alert and oriented to person, place, and time. Psychiatric:         Mood and Affect: Mood normal. Mood is not anxious or depressed. Speech: Speech normal.         Behavior: Behavior normal.         Thought Content: Thought content normal.         Judgment: Judgment normal.            An electronic signature was used to authenticate this note.     --Xavier Church MD

## 2022-01-13 ENCOUNTER — OFFICE VISIT (OUTPATIENT)
Dept: GASTROENTEROLOGY | Age: 32
End: 2022-01-13
Payer: COMMERCIAL

## 2022-01-13 ENCOUNTER — HOSPITAL ENCOUNTER (OUTPATIENT)
Age: 32
Setting detail: SPECIMEN
Discharge: HOME | End: 2022-01-13

## 2022-01-13 VITALS — SYSTOLIC BLOOD PRESSURE: 129 MMHG | DIASTOLIC BLOOD PRESSURE: 88 MMHG | BODY MASS INDEX: 38.96 KG/M2 | WEIGHT: 227 LBS

## 2022-01-13 DIAGNOSIS — K90.0 CELIAC DISEASE: ICD-10-CM

## 2022-01-13 DIAGNOSIS — E73.9 LACTOSE INTOLERANCE: ICD-10-CM

## 2022-01-13 DIAGNOSIS — K90.0 CELIAC DISEASE: Primary | ICD-10-CM

## 2022-01-13 LAB
IRON SATURATION: 16 % (ref 20–55)
IRON: 56 UG/DL (ref 37–145)
TOTAL IRON BINDING CAPACITY: 357 UG/DL (ref 250–450)
UNSATURATED IRON BINDING CAPACITY: 301 UG/DL (ref 112–347)
VITAMIN D 25-HYDROXY: 42.8 NG/ML (ref 30–100)

## 2022-01-13 PROCEDURE — 99203 OFFICE O/P NEW LOW 30 MIN: CPT | Performed by: INTERNAL MEDICINE

## 2022-01-13 ASSESSMENT — ENCOUNTER SYMPTOMS
RESPIRATORY NEGATIVE: 1
EYES NEGATIVE: 1
GASTROINTESTINAL NEGATIVE: 1

## 2022-01-13 NOTE — PROGRESS NOTES
Reason for Referral: Celiac disease. Mandie Mcknight MD  [address] [facility],  [address] [place] [address]    Chief Complaint   Patient presents with    New Patient     referred for Celiac Disease    Celiac Disease     Patient was having abdominal pain, loose bowel movements, joint pain prior to being diagnosed. Since starting with GFD she states abdominal pain gone. Patient has 3-4 bowel movements a day, but they are more formed now.  Lactose Intolerance     Patient states having dairy intolerance and would like to have lactaid           HISTORY OF PRESENT ILLNESS: Ms.Samantha BREANNA Crump is a 33 y.o. female with a past history remarkable for DDD, RFA, chronic pain in the lower back requiring radiofrequency ablation, referred for evaluation of new diagnosis celiac disease with positive TTG IgA and GDA. Patient has been maintaining gluten-free diet for the last 4 months. No recent endoscopic evaluation. Clinical improvement reported by the patient. She previously had episode of dyspepsia bloating and loose bowel movements which is now significantly improved. Reports some unintentional weight gain. Referred for evaluation of the patient celiac disease and management. Denies any extraintestinal infestations, no rashes. Smoker: None   Drinking history: Socially   Illicit drugs: none   Abdominal surgeries: None   Prior Colonoscopy: none   Prior EGD: None   FH of GI issues: Maternal GF-possible celiac disease. Past Medical,Family, and Social History reviewed and does contribute to the patient presentingcondition. Patient's PMH/PSH,SH,PSYCH Hx, MEDs, ALLERGIES, and ROS were all reviewed and updated in the appropriate sections.     PAST MEDICAL HISTORY:  Past Medical History:   Diagnosis Date    Allergic rhinitis     Dermatitis     Hypoglycemia        Past Surgical History:   Procedure Laterality Date    PAIN MANAGEMENT PROCEDURE  Septemeber 2021    WISDOM TOOTH EXTRACTION         CURRENT MEDICATIONS:    Current Outpatient Medications:     Norgestim-Eth Estrad Triphasic 0.18/0.215/0.25 MG-35 MCG TABS, TAKE 1 TABLET DAILY, Disp: 84 tablet, Rfl: 3    citalopram (CELEXA) 20 MG tablet, Take 1 tablet by mouth daily, Disp: 90 tablet, Rfl: 3    loratadine (CLARITIN REDITABS) 10 MG dissolvable tablet, Take 10 mg by mouth daily, Disp: , Rfl:     ALLERGIES:   Allergies   Allergen Reactions    Gluten Meal     Lactose Intolerance (Gi)     Valium Other (See Comments)     Hallucinations         FAMILY HISTORY: History reviewed. No pertinent family history. SOCIAL HISTORY:   Social History     Socioeconomic History    Marital status:      Spouse name: Not on file    Number of children: Not on file    Years of education: Not on file    Highest education level: Not on file   Occupational History    Not on file   Tobacco Use    Smoking status: Never Smoker    Smokeless tobacco: Never Used   Vaping Use    Vaping Use: Never used   Substance and Sexual Activity    Alcohol use: Yes     Comment: once a week    Drug use: Never    Sexual activity: Yes     Partners: Male   Other Topics Concern    Not on file   Social History Narrative    Not on file     Social Determinants of Health     Financial Resource Strain: Low Risk     Difficulty of Paying Living Expenses: Not hard at all   Food Insecurity: No Food Insecurity    Worried About Running Out of Food in the Last Year: Never true    [address] in the Last Year: Never true   Transportation Needs:     Lack of Transportation (Medical): Not on file    Lack of Transportation (Non-Medical):  Not on file   Physical Activity:     Days of Exercise per Week: Not on file    Minutes of Exercise per Session: Not on file   Stress:     Feeling of Stress : Not on file   Social Connections:     Frequency of Communication with Friends and Family: Not on file    Frequency of Social Gatherings with Friends and Family: Not on file    Attends Muslim Services: Not on file    Active Member of Clubs or Organizations: Not on file    Attends Club or Organization Meetings: Not on file    Marital Status: Not on file   Intimate Partner Violence:     Fear of Current or Ex-Partner: Not on file    Emotionally Abused: Not on file    Physically Abused: Not on file    Sexually Abused: Not on file   Housing Stability:     Unable to Pay for Housing in the Last Year: Not on file    Number of [place] in the Last Year: Not on file    Unstable Housing in the Last Year: Not on file         REVIEW OF SYSTEMS: A 12-point review of systems was obtained and pertinent positives and negatives were listed below. REVIEW OF SYSTEMS:     Constitutional: No fever, no chills, no lethargy, no weakness. HEENT:  No headache, otalgia, itchy eyes, nasal discharge or sore throat. Cardiac:  No chest pain, dyspnea, orthopnea or PND. Chest:   No cough, phlegm or wheezing. Abdomen:      Detailed by MA   Neuro:  No focal weakness, abnormal movements or seizure like activity. Skin:   No rashes, no itching. :   No hematuria, no pyuria, no dysuria, no flank pain. Extremities:  No swelling or joint pains. ROS was otherwise negative    Review of Systems   Constitutional: Negative. HENT: Negative. Eyes: Negative. Respiratory: Negative. Cardiovascular: Negative. Gastrointestinal: Negative. Endocrine: Negative. Genitourinary: Negative. Musculoskeletal: Negative. Skin: Negative. Allergic/Immunologic: Positive for food allergies. Neurological: Negative. Hematological: Negative. All other systems reviewed and are negative. PHYSICAL EXAMINATION: Vital signs reviewed per the nursing documentation. /88   Wt 227 lb (103 kg)   BMI 38.96 kg/m²   Body mass index is 38.96 kg/m². Physical Exam    Physical Exam   Constitutional: Patient is oriented to person, place, and time.  Patient appears well-developed and well-nourished. HENT:   Head: Normocephalic and atraumatic. Eyes: Pupils are equal, round, and reactive to light. EOM are normal.   Neck: Normal range of motion. Neck supple. No JVD present. No tracheal deviation present. No thyromegaly present. Cardiovascular: Normal rate, regular rhythm, normal heart sounds and intact distal pulses. Pulmonary/Chest: Effort normal and breath sounds normal. No stridor. No respiratory distress. He has no wheezes. He has no rales. He exhibits no tenderness. Abdominal: Soft. Bowel sounds are normal. He exhibits no distension and no mass. There is no tenderness. There is no rebound and no guarding. No hernia. Musculoskeletal: Normal range of motion. Lymphadenopathy:    Patient has no cervical adenopathy. Neurological: Patient is alert and oriented to person, place, and time. Psychiatric: Patient has a normal mood and affect. Patient behavior is normal.       LABORATORY DATA: Reviewed  Lab Results   Component Value Date    WBC 8.2 07/07/2021    HGB 13.3 07/07/2021    HCT 46.0 07/07/2021    .8 (H) 07/07/2021     07/07/2021     (L) 07/07/2021    K 4.8 07/07/2021     07/07/2021    CO2 17 (L) 07/07/2021    BUN 17 07/07/2021    CREATININE 0.98 (H) 07/07/2021    LABALBU 3.5 07/07/2021    BILITOT 0.18 (L) 07/07/2021    ALKPHOS 58 07/07/2021    AST 47 (H) 07/07/2021    ALT 34 (H) 07/07/2021         Lab Results   Component Value Date    RBC 4.39 07/07/2021    HGB 13.3 07/07/2021    .8 (H) 07/07/2021    MCH 30.3 07/07/2021    MCHC 28.9 07/07/2021    RDW 12.7 07/07/2021    MPV 11.8 07/07/2021    BASOPCT 1 07/07/2021    LYMPHSABS 2.25 07/07/2021    MONOSABS 0.58 07/07/2021    NEUTROABS 5.18 07/07/2021    EOSABS 0.09 07/07/2021    BASOSABS 0.06 07/07/2021         DIAGNOSTIC TESTING:     No results found.        IMPRESSION: Cosmo Pollack is a 33 y.o. female with a past history remarkable for DDD, RFA, chronic pain in the lower back requiring radiofrequency ablation, referred for evaluation of new diagnosis celiac disease with positive TTG IgA and GDA. Patient has been maintaining gluten-free diet for the last 4 months. No recent endoscopic evaluation. Clinical improvement reported by the patient. She previously had episode of dyspepsia bloating and loose bowel movements which is now significantly improved. Reports some unintentional weight gain. Referred for evaluation of the patient celiac disease and management. Denies any extraintestinal infestations, no rashes. Assessment  1. Celiac disease    2. Lactose intolerance        PLAN:    1) Celiac disease-- Celiac panel to be repeated to evaluate for compliancy with gluten-free diet. We will plan for diagnostic upper endoscopy to obtain duodenal baseline biopsies. Recommend evaluation of vitamin D, B12, folate, iron levels. We will likely need to obtain a baseline DEXA scan. 2) continue with gluten-free diet. Follow-up after upper endoscopy. 3) overall, clinically doing well. Thank you for allowing me to participate in the care of Ms. Mele Martins. For any further questions please do not hesitate to contact me. I have reviewed and agree with the MA/LPN ROS please refer to their documentation from today's encounter on a separate note. Francesco Joy MD, MPH   [facility]  Office #: [phone]          this note is created with the assistance of a speech recognition program.  While intending to generate a document that actually reflects the content of the visit, the document can still have some errors including those of syntax and sound a like substitutions which may escape proof reading. It such instances, actual meaning can be extrapolated by contextual diversion.

## 2022-01-14 LAB
FOLATE: >20 NG/ML
GLIADIN DEAMINIDATED PEPTIDE AB IGA: 7.3 U/ML
GLIADIN DEAMINIDATED PEPTIDE AB IGG: 14 U/ML
IGA: 362 MG/DL (ref 70–400)
TISSUE TRANSGLUTAMINASE IGA: 1.4 U/ML
VITAMIN B-12: 548 PG/ML (ref 232–1245)

## 2022-01-17 ENCOUNTER — TELEPHONE (OUTPATIENT)
Dept: GASTROENTEROLOGY | Age: 32
End: 2022-01-17

## 2022-01-17 NOTE — TELEPHONE ENCOUNTER
Called patient. No answer. LVM informing patient that her Celiac panel did show she is being exposed to gluten. Encouraged patient to follow GFD as strictly as possible. If she has any questions she was advised to contact our office or use [employer].

## 2022-01-19 NOTE — TELEPHONE ENCOUNTER
Writer spoke to pt over the phone in regards to rescheduling 1/20/22 EGD proc d/t prov illness. Pt is resched w/ Pj Rivera at [facility] Wed 1/26/22 @ 11:30am proc time, 10am arrival time. Prep instructions w/ new dates given to pt over the phone. Pt reminded of needing a  and to bring proof of Covid-19 vaccinations on proc day. Pt voices her understanding.

## 2022-01-24 ENCOUNTER — ANESTHESIA EVENT (OUTPATIENT)
Dept: OPERATING ROOM | Age: 32
End: 2022-01-24
Payer: COMMERCIAL

## 2022-01-24 RX ORDER — SODIUM CHLORIDE 0.9 % (FLUSH) 0.9 %
10 SYRINGE (ML) INJECTION EVERY 12 HOURS SCHEDULED
Status: CANCELLED | OUTPATIENT
Start: 2022-01-24

## 2022-01-24 RX ORDER — SODIUM CHLORIDE, SODIUM LACTATE, POTASSIUM CHLORIDE, CALCIUM CHLORIDE 600; 310; 30; 20 MG/100ML; MG/100ML; MG/100ML; MG/100ML
INJECTION, SOLUTION INTRAVENOUS CONTINUOUS
Status: CANCELLED | OUTPATIENT
Start: 2022-01-24

## 2022-01-24 RX ORDER — SODIUM CHLORIDE 9 MG/ML
25 INJECTION, SOLUTION INTRAVENOUS PRN
Status: CANCELLED | OUTPATIENT
Start: 2022-01-24

## 2022-01-24 RX ORDER — SODIUM CHLORIDE 0.9 % (FLUSH) 0.9 %
10 SYRINGE (ML) INJECTION PRN
Status: CANCELLED | OUTPATIENT
Start: 2022-01-24

## 2022-01-24 RX ORDER — SODIUM CHLORIDE 9 MG/ML
INJECTION, SOLUTION INTRAVENOUS CONTINUOUS
Status: CANCELLED | OUTPATIENT
Start: 2022-01-24

## 2022-01-26 ENCOUNTER — ANESTHESIA (OUTPATIENT)
Dept: OPERATING ROOM | Age: 32
End: 2022-01-26
Payer: COMMERCIAL

## 2022-01-26 ENCOUNTER — HOSPITAL ENCOUNTER (OUTPATIENT)
Age: 32
Setting detail: OUTPATIENT SURGERY
Discharge: HOME | End: 2022-01-26
Attending: INTERNAL MEDICINE | Admitting: INTERNAL MEDICINE
Payer: COMMERCIAL

## 2022-01-26 VITALS
SYSTOLIC BLOOD PRESSURE: 120 MMHG | BODY MASS INDEX: 39.09 KG/M2 | TEMPERATURE: 97.1 F | HEIGHT: 64 IN | HEART RATE: 60 BPM | OXYGEN SATURATION: 100 % | DIASTOLIC BLOOD PRESSURE: 78 MMHG | WEIGHT: 229 LBS | RESPIRATION RATE: 10 BRPM

## 2022-01-26 VITALS
RESPIRATION RATE: 17 BRPM | OXYGEN SATURATION: 100 % | DIASTOLIC BLOOD PRESSURE: 63 MMHG | SYSTOLIC BLOOD PRESSURE: 122 MMHG

## 2022-01-26 LAB — HCG, PREGNANCY URINE (POC): NEGATIVE

## 2022-01-26 PROCEDURE — 88305 TISSUE EXAM BY PATHOLOGIST: CPT

## 2022-01-26 PROCEDURE — 6360000002 HC RX W HCPCS: Performed by: ANESTHESIOLOGY

## 2022-01-26 PROCEDURE — 2500000003 HC RX 250 WO HCPCS: Performed by: ANESTHESIOLOGY

## 2022-01-26 PROCEDURE — 81025 URINE PREGNANCY TEST: CPT

## 2022-01-26 PROCEDURE — 2580000003 HC RX 258: Performed by: ANESTHESIOLOGY

## 2022-01-26 PROCEDURE — 3700000001 HC ADD 15 MINUTES (ANESTHESIA): Performed by: INTERNAL MEDICINE

## 2022-01-26 PROCEDURE — 2709999900 HC NON-CHARGEABLE SUPPLY: Performed by: INTERNAL MEDICINE

## 2022-01-26 PROCEDURE — 3609012400 HC EGD TRANSORAL BIOPSY SINGLE/MULTIPLE: Performed by: INTERNAL MEDICINE

## 2022-01-26 PROCEDURE — 3700000000 HC ANESTHESIA ATTENDED CARE: Performed by: INTERNAL MEDICINE

## 2022-01-26 PROCEDURE — 7100000010 HC PHASE II RECOVERY - FIRST 15 MIN: Performed by: INTERNAL MEDICINE

## 2022-01-26 PROCEDURE — 43239 EGD BIOPSY SINGLE/MULTIPLE: CPT | Performed by: INTERNAL MEDICINE

## 2022-01-26 PROCEDURE — 7100000011 HC PHASE II RECOVERY - ADDTL 15 MIN: Performed by: INTERNAL MEDICINE

## 2022-01-26 RX ORDER — MORPHINE SULFATE 1 MG/ML
1 INJECTION, SOLUTION EPIDURAL; INTRATHECAL; INTRAVENOUS EVERY 5 MIN PRN
Status: DISCONTINUED | OUTPATIENT
Start: 2022-01-26 | End: 2022-01-26 | Stop reason: HOSPADM

## 2022-01-26 RX ORDER — HYDROCODONE BITARTRATE AND ACETAMINOPHEN 5; 325 MG/1; MG/1
1 TABLET ORAL PRN
Status: DISCONTINUED | OUTPATIENT
Start: 2022-01-26 | End: 2022-01-26 | Stop reason: HOSPADM

## 2022-01-26 RX ORDER — HYDRALAZINE HYDROCHLORIDE 20 MG/ML
5 INJECTION INTRAMUSCULAR; INTRAVENOUS EVERY 10 MIN PRN
Status: DISCONTINUED | OUTPATIENT
Start: 2022-01-26 | End: 2022-01-26 | Stop reason: HOSPADM

## 2022-01-26 RX ORDER — MEPERIDINE HYDROCHLORIDE 50 MG/ML
12.5 INJECTION INTRAMUSCULAR; INTRAVENOUS; SUBCUTANEOUS EVERY 5 MIN PRN
Status: DISCONTINUED | OUTPATIENT
Start: 2022-01-26 | End: 2022-01-26 | Stop reason: HOSPADM

## 2022-01-26 RX ORDER — DIPHENHYDRAMINE HYDROCHLORIDE 50 MG/ML
12.5 INJECTION INTRAMUSCULAR; INTRAVENOUS
Status: DISCONTINUED | OUTPATIENT
Start: 2022-01-26 | End: 2022-01-26 | Stop reason: HOSPADM

## 2022-01-26 RX ORDER — HYDROCODONE BITARTRATE AND ACETAMINOPHEN 5; 325 MG/1; MG/1
2 TABLET ORAL PRN
Status: DISCONTINUED | OUTPATIENT
Start: 2022-01-26 | End: 2022-01-26 | Stop reason: HOSPADM

## 2022-01-26 RX ORDER — SODIUM CHLORIDE, SODIUM LACTATE, POTASSIUM CHLORIDE, CALCIUM CHLORIDE 600; 310; 30; 20 MG/100ML; MG/100ML; MG/100ML; MG/100ML
INJECTION, SOLUTION INTRAVENOUS CONTINUOUS PRN
Status: DISCONTINUED | OUTPATIENT
Start: 2022-01-26 | End: 2022-01-26 | Stop reason: SDUPTHER

## 2022-01-26 RX ORDER — PROMETHAZINE HYDROCHLORIDE 25 MG/ML
6.25 INJECTION, SOLUTION INTRAMUSCULAR; INTRAVENOUS
Status: DISCONTINUED | OUTPATIENT
Start: 2022-01-26 | End: 2022-01-26 | Stop reason: HOSPADM

## 2022-01-26 RX ORDER — FENTANYL CITRATE 50 UG/ML
25 INJECTION, SOLUTION INTRAMUSCULAR; INTRAVENOUS EVERY 5 MIN PRN
Status: DISCONTINUED | OUTPATIENT
Start: 2022-01-26 | End: 2022-01-26 | Stop reason: HOSPADM

## 2022-01-26 RX ORDER — PROPOFOL 10 MG/ML
INJECTION, EMULSION INTRAVENOUS PRN
Status: DISCONTINUED | OUTPATIENT
Start: 2022-01-26 | End: 2022-01-26 | Stop reason: SDUPTHER

## 2022-01-26 RX ORDER — LIDOCAINE HYDROCHLORIDE 10 MG/ML
INJECTION, SOLUTION EPIDURAL; INFILTRATION; INTRACAUDAL; PERINEURAL PRN
Status: DISCONTINUED | OUTPATIENT
Start: 2022-01-26 | End: 2022-01-26 | Stop reason: SDUPTHER

## 2022-01-26 RX ORDER — ONDANSETRON 2 MG/ML
4 INJECTION INTRAMUSCULAR; INTRAVENOUS
Status: DISCONTINUED | OUTPATIENT
Start: 2022-01-26 | End: 2022-01-26 | Stop reason: HOSPADM

## 2022-01-26 RX ADMIN — PROPOFOL 30 MG: 10 INJECTION, EMULSION INTRAVENOUS at 11:32

## 2022-01-26 RX ADMIN — SODIUM CHLORIDE, POTASSIUM CHLORIDE, SODIUM LACTATE AND CALCIUM CHLORIDE: 600; 310; 30; 20 INJECTION, SOLUTION INTRAVENOUS at 10:57

## 2022-01-26 RX ADMIN — PROPOFOL 50 MG: 10 INJECTION, EMULSION INTRAVENOUS at 11:24

## 2022-01-26 RX ADMIN — PROPOFOL 50 MG: 10 INJECTION, EMULSION INTRAVENOUS at 11:27

## 2022-01-26 RX ADMIN — LIDOCAINE HYDROCHLORIDE 50 MG: 10 INJECTION, SOLUTION EPIDURAL; INFILTRATION; INTRACAUDAL; PERINEURAL at 11:20

## 2022-01-26 RX ADMIN — PROPOFOL 100 MG: 10 INJECTION, EMULSION INTRAVENOUS at 11:20

## 2022-01-26 RX ADMIN — PROPOFOL 50 MG: 10 INJECTION, EMULSION INTRAVENOUS at 11:30

## 2022-01-26 ASSESSMENT — PULMONARY FUNCTION TESTS
PIF_VALUE: 1
PIF_VALUE: 0
PIF_VALUE: 1
PIF_VALUE: 0
PIF_VALUE: 1

## 2022-01-26 ASSESSMENT — PAIN SCALES - GENERAL: PAINLEVEL_OUTOF10: 0

## 2022-01-26 NOTE — ANESTHESIA PRE PROCEDURE
Department of Anesthesiology  Preprocedure Note       Name:  Fede Irby   Age:  33 y.o.  :  1990                                          MRN:  [id number]         Date:  2022      Surgeon: Bishop Brody):  Adenike Fleming MD    Procedure: Procedure(s):  EGD BIOPSY    Medications prior to admission:   Prior to Admission medications    Medication Sig Start Date End Date Taking? Authorizing Provider   Norgestim-Eth Estrad Triphasic 0.18/0.215/0.25 MG-35 MCG TABS TAKE 1 TABLET DAILY 21   Therese Kebede, APRN - CNP   citalopram (CELEXA) 20 MG tablet Take 1 tablet by mouth daily 21  Sondra Trujillo MD   loratadine (CLARITIN REDITABS) 10 MG dissolvable tablet Take 10 mg by mouth daily    Historical Provider, MD       Current medications:    No current facility-administered medications for this encounter. Allergies:     Allergies   Allergen Reactions    Gluten Meal     Lactose Intolerance (Gi)     Valium Other (See Comments)     Hallucinations         Problem List:    Patient Active Problem List   Diagnosis Code    Generalized anxiety disorder F41.1    Adult night terror F51.4    Panic attacks F41.0    Current mild episode of major depressive disorder without prior episode ([address] F32.0    Celiac disease K90.0       Past Medical History:        Diagnosis Date    Allergic rhinitis     Dermatitis     Hypoglycemia        Past Surgical History:        Procedure Laterality Date    PAIN MANAGEMENT PROCEDURE  2021    WISDOM TOOTH EXTRACTION         Social History:    Social History     Tobacco Use    Smoking status: Never Smoker    Smokeless tobacco: Never Used   Substance Use Topics    Alcohol use: Yes     Comment: once a week                                Counseling given: Not Answered      Vital Signs (Current):   Vitals:    22 1023   Weight: 229 lb (103.9 kg)   Height: 5' 4\" (1.626 m)                                              BP Readings from Last 3 Encounters:   01/13/22 129/88   01/07/22 108/82   01/05/22 108/72       NPO Status: Time of last liquid consumption: 2200                        Time of last solid consumption: 2200                        Date of last liquid consumption: 01/25/22                        Date of last solid food consumption: 01/25/22    BMI:   Wt Readings from Last 3 Encounters:   01/26/22 229 lb (103.9 kg)   01/13/22 227 lb (103 kg)   01/07/22 230 lb (104.3 kg)     Body mass index is 39.31 kg/m². CBC:   Lab Results   Component Value Date    WBC 8.2 07/07/2021    RBC 4.39 07/07/2021    HGB 13.3 07/07/2021    HCT 46.0 07/07/2021    .8 07/07/2021    RDW 12.7 07/07/2021     07/07/2021       CMP:   Lab Results   Component Value Date     07/07/2021    K 4.8 07/07/2021     07/07/2021    CO2 17 07/07/2021    BUN 17 07/07/2021    CREATININE 0.98 07/07/2021    GFRAA >60 07/07/2021    LABGLOM >60 07/07/2021    GLUCOSE 85 08/27/2020    PROT 7.9 07/07/2021    CALCIUM 9.3 07/07/2021    BILITOT 0.18 07/07/2021    ALKPHOS 58 07/07/2021    AST 47 07/07/2021    ALT 34 07/07/2021       POC Tests: No results for input(s): POCGLU, POCNA, POCK, POCCL, POCBUN, POCHEMO, POCHCT in the last 72 hours.     Coags: No results found for: PROTIME, INR, APTT    HCG (If Applicable): Negative 9/65/48  Lab Results   Component Value Date    HCG NEGATIVE 10/21/2021        ABGs: No results found for: PHART, PO2ART, UFU5RCD, XSD0CFJ, BEART, Q3PVHJDE     Type & Screen (If Applicable):  No results found for: LABABO, LABRH    Drug/Infectious Status (If Applicable):  No results found for: HIV, HEPCAB    COVID-19 Screening (If Applicable): No results found for: COVID19        Anesthesia Evaluation  Patient summary reviewed and Nursing notes reviewed no history of anesthetic complications:   Airway: Mallampati: II  TM distance: >3 FB   Neck ROM: full  Mouth opening: > = 3 FB Dental: normal exam         Pulmonary:Negative Pulmonary ROS and normal exam  breath sounds clear to auscultation                             Cardiovascular:Negative CV ROS            Rhythm: regular  Rate: normal                    Neuro/Psych:   (+) psychiatric history: stable with treatmentdepression/anxiety             GI/Hepatic/Renal:            ROS comment: Severe obesity  DYSPEPSIA. Endo/Other: Negative Endo/Other ROS                    Abdominal:       Abdomen: soft. Vascular: negative vascular ROS. Other Findings:             Anesthesia Plan      MAC     ASA 2             Anesthetic plan and risks discussed with patient. Plan discussed with CRNA.                   Jayjay Montes MD   1/26/2022

## 2022-01-26 NOTE — ANESTHESIA POSTPROCEDURE EVALUATION
POST- ANESTHESIA EVALUATION       Pt Name: Hiwot Bowers  MRN: [id number]  YOB: 1990  Date of evaluation: 1/26/2022  Time:  11:59 AM      /89   Pulse 63   Temp 97.1 °F (36.2 °C) (Temporal)   Resp 14   Ht 5' 4\" (1.626 m)   Wt 229 lb (103.9 kg)   LMP 01/05/2022   SpO2 99%   BMI 39.31 kg/m²      Consciousness Level  Awake  Cardiopulmonary Status  Stable  Pain Adequately Treated YES  Nausea / Vomiting  NO  Adequate Hydration  YES  Anesthesia Related Complications NONE      Electronically signed by Uziel Hill MD on 1/26/2022 at 11:59 AM       Department of Anesthesiology  Postprocedure Note    Patient: Hiwot Bowers  MRN: [id number]  YOB: 1990  Date of evaluation: 1/26/2022  Time:  11:58 AM     Procedure Summary     Date: 01/26/22 Room / Location: [place] [address] / [facility]    Anesthesia Start: 1266 Anesthesia Stop: 1137    Procedure: EGD BIOPSY (N/A ) Diagnosis: (K30 DYSPEPSIA)    Surgeons: Chaparrita Hurd MD Responsible Provider: Uziel Hill MD    Anesthesia Type: MAC ASA Status: 2          Anesthesia Type: MAC    Maria L Phase I: Maria L Score: 10    Maria L Phase II: Maria L Score: 9    Last vitals: Reviewed and per EMR flowsheets.        Anesthesia Post Evaluation

## 2022-01-26 NOTE — OP NOTE
Operative Note      Patient: Guerrero Owen  YOB: 1990  MRN: [id number]    Date of Procedure: 1/26/2022    Pre-Op Diagnosis: K30 DYSPEPSIA    Post-Op Diagnosis: Mild gastritis. Procedure(s):  EGD BIOPSY    Surgeon(s):  Susu Carmona MD    Assistant:   * No surgical staff found *    Anesthesia: Monitor Anesthesia Care    Estimated Blood Loss (mL): Minimal    Complications: None    Specimens:   ID Type Source Tests Collected by Time Destination   A : D2 Tissue Duodenum SURGICAL PATHOLOGY Susu Carmona MD 1/26/2022 1127    B : D1 Tissue Duodenum SURGICAL PATHOLOGY Susu Carmona MD 1/26/2022 1129    C : STOMACH R/O H PYLORI  Tissue Stomach SURGICAL PATHOLOGY Susu Carmona MD 1/26/2022 1130        Implants:  * No implants in log *      Drains: * No LDAs found *          [facility]    [facility]    EGD    PROCEDURE DATE: 01/26/22    REFERRING PHYSICIAN: No ref. provider found     PRIMARY CARE PROVIDER: Paulene Moritz, MD    ATTENDING PHYSICIAN: Susu Carmona MD     HISTORY: Ms. Guerrero Owen is a 33 y.o. female who presents to the  Endoscopy unit for upper endoscopy. The patient's clinical history is remarkable for FLASH, celiac disease, referred for diagnostic EGD. She is currently medically stable and appropriate for the planned procedure. PREOPERATIVE DIAGNOSIS: Dyspepsia and celiac disease. PROCEDURES:   1) Transoral Upper Endoscopy with cold biopsy of the stomach and duodenum. POSTOPERATIVE DIAGNOSIS:     1) Mild gastritis, no ulcerations, no erosions. 2) Normal appearing duodenal mucosa, 4 biopsies from D2 taken, 2 biopsies from D1.   3) Normal appearing esophageal mucosa     MEDICATIONS:   MAC per anesthesia     EBL: <10cc    INSTRUMENT: Olympus GIF-H190  flexible Gastroscope.      PREPARATION: The nature and character of the procedure as well as risks, benefits, and alternatives were discussed with the patient and informed consent was obtained. Complications were said to include, but were not limited to: medication allergy, medication reaction, cardiovascular and respiratory problems, bleeding, perforation, infection, and/or missed diagnosis. Following arrival in the endoscopy room, the patient was placed in the left lateral decubitus position and final time-out accomplished in the presence of the nursing staff. Baseline vital signs were obtained and reviewed, and IV sedation was subsequently initiated. FINDINGS:   Esophagus: The esophagus was inspected to the Z-line. The endoscopic exam showed small inlet patch in the proximal esophagus, normal GEJ. Stomach: The stomach was inspected in both forward and retroflex fashion and was appropriately distensible. The cardia, fundus, incisura, antrum and pylorus were identified via direct visualization. The endoscopic exam showed mild gastritis. Duodenum: The proximal small bowel was inspected through the bulb, sweep, and second portion of the duodenum. The endoscopic exam showed normal appearing mucosa. IMPRESSION:    1) Mild gastritis, no ulcerations, no erosions. 2) Normal appearing duodenal mucosa, 4 biopsies from D2 taken, 2 biopsies from D1.   3) Normal appearing esophageal mucosa       RECOMMENDATIONS:   1) Follow up path in GI clinic. 2) Continue with GFD. Lalo Jasso MD  [facility]   01/26/22    this note is created with the assistance of a speech recognition program.  While intending to generate a document that actually reflects the content of the visit, the document can still have some errors including those of syntax and sound a like substitutions which may escape proof reading. It such instances, actual meaning can be extrapolated by contextual diversion. The patient was counseled at length about the risks of yoel Covid-19 during their perioperative period and any recovery window from their procedure.   The patient was made aware that yoel Covid-19  may worsen their prognosis for recovering from their procedure  and lend to a higher morbidity and/or mortality risk. All material risks, benefits, and reasonable alternatives including postponing the procedure were discussed. The patient DOES wish to proceed with the procedure at this time.     Electronically signed by Javid Forbes MD on 1/26/2022 at 11:35 AM

## 2022-01-26 NOTE — H&P
Procedure History and Physical    Pre-Procedural Diagnosis:  Celiac disease    Indications:  same    Procedure Planned: endoscopy     History Obtained From:  patient    HISTORY OF PRESENT ILLNESS:       The patient is a 33 y.o. female who presents for the above procedure. Past Medical History:    Past Medical History:   Diagnosis Date    Allergic rhinitis     Dermatitis     Hypoglycemia        Past Surgical History:    Past Surgical History:   Procedure Laterality Date    PAIN MANAGEMENT PROCEDURE  Septemeber 2021    WISDOM TOOTH EXTRACTION         Medications:  Current Facility-Administered Medications   Medication Dose Route Frequency Provider Last Rate Last Admin    meperidine (DEMEROL) injection 12.5 mg  12.5 mg IntraVENous Q5 Min PRN Jeffrey Pantoja MD        morphine (PF) injection 1 mg  1 mg IntraVENous Q5 Min PRN Jeffrey Pantoja MD        HYDROmorphone (DILAUDID) injection 0.5 mg  0.5 mg IntraVENous Q5 Min PRN Jeffrey Pantoja MD        fentaNYL (SUBLIMAZE) injection 25 mcg  25 mcg IntraVENous Q5 Min PRN Jeffrey Pantoja MD        HYDROcodone-acetaminophen (NORCO) 5-325 MG per tablet 1 tablet  1 tablet Oral PRN Jeffrey Pantoja MD        Or    HYDROcodone-acetaminophen (NORCO) 5-325 MG per tablet 2 tablet  2 tablet Oral PRN Jeffrey Pantoja MD        ondansetron [facility]) injection 4 mg  4 mg IntraVENous Once PRN Jeffrey Pantoja MD        promethazine (PHENERGAN) injection 6.25 mg  6.25 mg IntraMUSCular Once PRN Jeffrey Pantoja MD        diphenhydrAMINE (BENADRYL) injection 12.5 mg  12.5 mg IntraVENous Once PRN Jeffrey Pantoja MD        hydrALAZINE (APRESOLINE) injection 5 mg  5 mg IntraVENous Q10 Min PRN Jeffrey Pantoja MD           Allergies: Allergies   Allergen Reactions    Gluten Meal     Lactose Intolerance (Gi)     Valium Other (See Comments)     Hallucinations                   Social   Social History     Tobacco Use    Smoking status: Never Smoker    Smokeless tobacco: Never Used   Substance Use Topics    Alcohol use:  Yes Comment: once a week        PSYCH HISTORY:  Depression No  Anxiety No  Suicide No       History reviewed. No pertinent family history. No family history of colon cancer, Crohn's disease, or ulcerative colitis    Problems with Sedation/Anesthesia in the past? no    REVIEW OF SYSTEMS:  12 point review of systems negative other than mentioned above. PHYSICAL EXAM:    Vitals:  BP (!) 145/86   Pulse 86   Temp 96.9 °F (36.1 °C) (Temporal)   Resp 16   Ht 5' 4\" (1.626 m)   Wt 229 lb (103.9 kg)   LMP 01/05/2022   SpO2 98%   BMI 39.31 kg/m²     Focused Exam related to procedure:    General appearance: NAD, conversant   Eyes: anicteric sclerae, moist conjunctivae; no lid-lag; PERRLA   Lungs: CTA, with normal respiratory effort and no intercostal retractions   CV: RRR, no MRGs   Abdomen: Soft, non-tender; no masses or HSM   Skin: Normal temperature, turgor and texture; no rash, ulcers or subcutaneous nodules     DATA:  CBC:   Lab Results   Component Value Date    WBC 8.2 07/07/2021    HGB 13.3 07/07/2021    HCT 46.0 07/07/2021    .8 (H) 07/07/2021     07/07/2021     BUN/Cr:   Lab Results   Component Value Date    BUN 17 07/07/2021   ,   Lab Results   Component Value Date    CREATININE 0.98 (H) 07/07/2021     Potassium:   Lab Results   Component Value Date    K 4.8 07/07/2021     PT/INR: No results found for: INR, PROTIME    ASSESSMENT AND PLAN:       1. Patient is a 33 y.o. female with above specified procedure planned. Expected Sedation/Anesthesia Type: MAC    2. ASA ([address] Anesthesiology) Anesthesia Status: Class 2 - A normal healthy patient with mild systemic disease    3. Mallampati: II (soft palate, uvula, fauces visible)  4. Procedure options, risks and benefits reviewed with Patient. Patient expresses understanding.     5.  Consent has been signed:  Yes    Raquel Church MD

## 2022-01-27 LAB — SURGICAL PATHOLOGY REPORT: NORMAL

## 2022-02-10 ENCOUNTER — VIRTUAL VISIT (OUTPATIENT)
Dept: PAIN MANAGEMENT | Age: 32
End: 2022-02-10
Payer: COMMERCIAL

## 2022-02-10 DIAGNOSIS — M51.36 DDD (DEGENERATIVE DISC DISEASE), LUMBAR: Primary | ICD-10-CM

## 2022-02-10 DIAGNOSIS — M47.817 LUMBOSACRAL SPONDYLOSIS WITHOUT MYELOPATHY: ICD-10-CM

## 2022-02-10 PROBLEM — M51.369 DDD (DEGENERATIVE DISC DISEASE), LUMBAR: Status: ACTIVE | Noted: 2022-02-10

## 2022-02-10 PROCEDURE — 99212 OFFICE O/P EST SF 10 MIN: CPT | Performed by: NURSE PRACTITIONER

## 2022-02-10 ASSESSMENT — ENCOUNTER SYMPTOMS
BOWEL INCONTINENCE: 0
COUGH: 0
SHORTNESS OF BREATH: 0
CONSTIPATION: 0
BACK PAIN: 1

## 2022-02-10 NOTE — PROGRESS NOTES
Chief Complaint   Patient presents with    Back Pain         [facility] Pt complains of back pain. MRI with degenerative changes at L4-5 and L5-S1. There is a disc bulge at L5-S1. She did see a neurosurgeon who recommended conservative therapies. She had lumbar RFA 10/2021 with significant relief. Back Pain  This is a chronic problem. The current episode started more than 1 year ago. The problem occurs constantly. The problem has been rapidly improving since onset. The pain is present in the lumbar spine. The quality of the pain is described as aching and shooting. The pain does not radiate. The pain is at a severity of 3/10. The pain is mild. The pain is worse during the night. The symptoms are aggravated by position and lying down. Pertinent negatives include no bladder incontinence, bowel incontinence, dysuria, fever, leg pain, numbness, tingling or weakness. She has tried muscle relaxant, NSAIDs and heat (Physical Therapy; RFA) for the symptoms. The treatment provided significant relief. Patient denies any new neurological symptoms. No bowel or bladder incontinence, no weakness, and no falling.       Past Medical History:   Diagnosis Date    Allergic rhinitis     Dermatitis     Hypoglycemia        Past Surgical History:   Procedure Laterality Date    PAIN MANAGEMENT PROCEDURE  Septemeber 2021    UPPER GASTROINTESTINAL ENDOSCOPY  01/26/2022    EGD BIOPSY     UPPER GASTROINTESTINAL ENDOSCOPY N/A 1/26/2022    EGD BIOPSY performed by Gerald Cadena MD at [address] [place] [address] EXTRACTION         Allergies   Allergen Reactions    Gluten Meal     Lactose Intolerance (Gi)     Valium Other (See Comments)     Hallucinations           Current Outpatient Medications:     Norgestim-Eth Estrad Triphasic 0.18/0.215/0.25 MG-35 MCG TABS, TAKE 1 TABLET DAILY, Disp: 84 tablet, Rfl: 3    citalopram (CELEXA) 20 MG tablet, Take 1 tablet by mouth daily, Disp: 90 tablet, Rfl: 3    loratadine (CLARITIN REDITABS) 10 MG dissolvable tablet, Take 10 mg by mouth daily, Disp: , Rfl:     No family history on file. Social History     Socioeconomic History    Marital status:      Spouse name: Not on file    Number of children: Not on file    Years of education: Not on file    Highest education level: Not on file   Occupational History    Not on file   Tobacco Use    Smoking status: Never Smoker    Smokeless tobacco: Never Used   Vaping Use    Vaping Use: Never used   Substance and Sexual Activity    Alcohol use: Yes     Comment: once a week    Drug use: Never    Sexual activity: Yes     Partners: Male   Other Topics Concern    Not on file   Social History Narrative    Not on file     Social Determinants of Health     Financial Resource Strain: Low Risk     Difficulty of Paying Living Expenses: Not hard at all   Food Insecurity: No Food Insecurity    Worried About Running Out of Food in the Last Year: Never true    [address] in the Last Year: Never true   Transportation Needs:     Lack of Transportation (Medical): Not on file    Lack of Transportation (Non-Medical):  Not on file   Physical Activity:     Days of Exercise per Week: Not on file    Minutes of Exercise per Session: Not on file   Stress:     Feeling of Stress : Not on file   Social Connections:     Frequency of Communication with Friends and Family: Not on file    Frequency of Social Gatherings with Friends and Family: Not on file    Attends Taoist Services: Not on file    Active Member of Clubs or Organizations: Not on file    Attends Club or Organization Meetings: Not on file    Marital Status: Not on file   Intimate Partner Violence:     Fear of Current or Ex-Partner: Not on file    Emotionally Abused: Not on file    Physically Abused: Not on file    Sexually Abused: Not on file   Housing Stability:     Unable to Pay for Housing in the Last Year: Not on file    Number of [place] in the Last Year: Not on file    Unstable Housing in the Last Year: Not on file       Review of Systems:  Review of Systems   Constitutional: Negative for chills and fever. Respiratory: Negative for cough and shortness of breath. Musculoskeletal: Positive for back pain. Gastrointestinal: Negative for bowel incontinence and constipation. Genitourinary: Negative for bladder incontinence and dysuria. Neurological: Negative for disturbances in coordination, loss of balance, numbness, tingling and weakness. Physical Exam:  There were no vitals taken for this visit. Physical Exam  HENT:      Head: Normocephalic. Pulmonary:      Effort: Pulmonary effort is normal.   Musculoskeletal:      Cervical back: Normal range of motion. Neurological:      Mental Status: She is alert and oriented to person, place, and time.    Psychiatric:         Mood and Affect: Mood normal.         Behavior: Behavior normal.         Record/Diagnostics Review:    FINDINGS:   BONES/ALIGNMENT: There is normal alignment of the lumbar spine.  There is no   acute fracture or listhesis.  Bone marrow signal intensity is normal.  There   is disc desiccation at L4-5 and L5-S1.  Type 1 degenerative endplate changes   are present at L5-S1.  There is no spondylolysis.       SPINAL CORD: The conus medullaris is normal in size and signal intensities   and terminates normally.       SOFT TISSUES: There is no paraspinal mass identified.       L1-L2: There is no disc bulge or protrusion present.  There is no significant   spinal canal stenosis or neural foraminal narrowing present.       L2-L3: There is no disc bulge or protrusion present.  There is no significant   spinal canal stenosis or neural foraminal narrowing present.       L3-L4: There is no disc bulge or protrusion present.  There is no significant   spinal canal stenosis or neural foraminal narrowing present.  There is   bilateral facet arthropathy.       L4-L5: There is a disc bulge present. Heraclio Flores is an annular tear at the   posterior margin of the disc.  Bilateral facet arthropathy is present. Lenetta Cave   is no significant spinal canal stenosis or neural foraminal narrowing.       L5-S1: There is a left paracentral disc extrusion demonstrating inferior   migration.  The disc extrusion measures 5 x 15 x 14 mm in AP by transverse by   craniocaudal dimension.  This demonstrates inferior migration, posterior   displacing the bilateral S1 nerve roots.  There is a concomitant disc bulge   and overall mild spinal canal stenosis.         Impression   1. Disc bulge with concomitant left paracentral disc extrusion at L5-S1   demonstrating inferior migration.  There is posterior displacement of the   bilateral S1 nerve roots and overall mild spinal canal stenosis. 2. Additional multilevel degenerative changes, as described above. Assessment:  Problem List Items Addressed This Visit     DDD (degenerative disc disease), lumbar - Primary    Lumbosacral spondylosis without myelopathy             Treatment Plan:  Patient states she continues to have significant relief of back pain following RFA last year  She has no concerns today  Follow up in three months    Connie Garcia, was evaluated through a synchronous (real-time) audio-video encounter. The patient (or guardian if applicable) is aware that this is a billable service, which includes applicable co-pays. This Virtual Visit was conducted with patient's (and/or legal guardian's) consent. The visit was conducted pursuant to the emergency declaration under the [address] [place] [address] waiver authority and the [employer] General Act. Patient identification was verified, and a caregiver was present when appropriate. The patient was located at home in a state where the provider was licensed to provide care.        Total time spent for this encounter: Not billed by time    --Elisa Ontiveros Robert Cancel - CNP on 2/10/2022 at 8:30 AM    An electronic signature was used to authenticate this note.

## 2022-03-08 ENCOUNTER — TELEPHONE (OUTPATIENT)
Dept: FAMILY MEDICINE CLINIC | Age: 32
End: 2022-03-08

## 2022-03-08 NOTE — TELEPHONE ENCOUNTER
----- Message from [facility] sent at 3/7/2022 11:15 AM EST -----  Subject: Message to Provider    QUESTIONS  Information for Provider? Patient called in t get the number to the office   for her to have her sleep study done at because they have not reached out   to her to schedule an appointment. She also was told that the sleep study   had to be pre certified before hand as well. Please contact the patient to   further assist.   ---------------------------------------------------------------------------  --------------  CALL BACK INFO  What is the best way for the office to contact you? OK to leave message on   voicemail  Preferred Call Back Phone Number? [phone]  ---------------------------------------------------------------------------  --------------  SCRIPT ANSWERS  Relationship to Patient?  Self

## 2022-03-11 NOTE — TELEPHONE ENCOUNTER
Writer spoke with sleep center and was told there are several patients in the work que but will call patient to schedule. Patient informed and has received call.

## 2022-04-08 ENCOUNTER — HOSPITAL ENCOUNTER (OUTPATIENT)
Dept: SLEEP CENTER | Age: 32
Discharge: HOME | End: 2022-04-10
Payer: COMMERCIAL

## 2022-04-08 VITALS
BODY MASS INDEX: 39.09 KG/M2 | HEIGHT: 64 IN | RESPIRATION RATE: 10 BRPM | WEIGHT: 229 LBS | OXYGEN SATURATION: 98 % | HEART RATE: 61 BPM

## 2022-04-08 DIAGNOSIS — R40.0 DAYTIME SOMNOLENCE: ICD-10-CM

## 2022-04-08 DIAGNOSIS — G47.9 SLEEP DISTURBANCE: ICD-10-CM

## 2022-04-08 PROCEDURE — 95810 POLYSOM 6/> YRS 4/> PARAM: CPT

## 2022-04-26 LAB — STATUS: NORMAL

## 2022-04-27 DIAGNOSIS — G47.9 SLEEP DISTURBANCE: Primary | ICD-10-CM

## 2022-04-27 DIAGNOSIS — R40.0 DAYTIME SOMNOLENCE: ICD-10-CM

## 2022-04-27 DIAGNOSIS — G47.52 REM SLEEP BEHAVIOR DISORDER: ICD-10-CM

## 2022-05-12 ENCOUNTER — TELEMEDICINE (OUTPATIENT)
Dept: PAIN MANAGEMENT | Age: 32
End: 2022-05-12
Payer: COMMERCIAL

## 2022-05-12 DIAGNOSIS — M47.817 LUMBOSACRAL SPONDYLOSIS WITHOUT MYELOPATHY: Primary | ICD-10-CM

## 2022-05-12 DIAGNOSIS — M51.36 DDD (DEGENERATIVE DISC DISEASE), LUMBAR: ICD-10-CM

## 2022-05-12 PROCEDURE — 99212 OFFICE O/P EST SF 10 MIN: CPT | Performed by: NURSE PRACTITIONER

## 2022-05-12 ASSESSMENT — ENCOUNTER SYMPTOMS
BACK PAIN: 1
BOWEL INCONTINENCE: 0
ABDOMINAL PAIN: 0

## 2022-05-12 NOTE — PROGRESS NOTES
Chief Complaint   Patient presents with    Back Pain       [facility]   Pt complains of back pain. MRI with degenerative changes at L4-5 and L5-S1. There is a disc bulge at L5-S1. She did see a neurosurgeon who recommended conservative therapies. She had lumbar RFA 10/2021 with significant relief. She continues to have good relief. Back Pain  This is a chronic problem. The current episode started more than 1 year ago. The problem occurs constantly. The problem has been gradually improving since onset. The pain is present in the lumbar spine. The quality of the pain is described as aching. The pain does not radiate. The pain is at a severity of 4/10. The pain is mild. The pain is worse during the day. The symptoms are aggravated by bending, lying down, position, standing, sitting and twisting. Stiffness is present in the morning and at night. Pertinent negatives include no abdominal pain, bladder incontinence, bowel incontinence, chest pain, fever, headaches, leg pain, numbness, pelvic pain, tingling or weakness. She has tried chiropractic manipulation, heat, ice, walking, home exercises, bed rest, muscle relaxant and NSAIDs for the symptoms. The treatment provided significant relief. Patient denies any new neurological symptoms. No bowel or bladder incontinence, no weakness, and no falling.       Past Medical History:   Diagnosis Date    Allergic rhinitis     Dermatitis     Hypoglycemia        Past Surgical History:   Procedure Laterality Date    PAIN MANAGEMENT PROCEDURE  Septemeber 2021    UPPER GASTROINTESTINAL ENDOSCOPY  01/26/2022    EGD BIOPSY     UPPER GASTROINTESTINAL ENDOSCOPY N/A 1/26/2022    EGD BIOPSY performed by Gilberto Issa MD at [address] [place] [address] EXTRACTION         Allergies   Allergen Reactions    Gluten Meal     Lactose Intolerance (Gi)     Valium Other (See Comments)     Hallucinations           Current Outpatient Medications:     tiZANidine (ZANAFLEX) 2 MG tablet, Take 1 tablet by mouth every evening, Disp: 30 tablet, Rfl: 2    Norgestim-Eth Estrad Triphasic 0.18/0.215/0.25 MG-35 MCG TABS, TAKE 1 TABLET DAILY, Disp: 84 tablet, Rfl: 3    citalopram (CELEXA) 20 MG tablet, Take 1 tablet by mouth daily, Disp: 90 tablet, Rfl: 3    loratadine (CLARITIN REDITABS) 10 MG dissolvable tablet, Take 10 mg by mouth daily, Disp: , Rfl:     No family history on file. Social History     Socioeconomic History    Marital status:      Spouse name: Not on file    Number of children: Not on file    Years of education: Not on file    Highest education level: Not on file   Occupational History    Not on file   Tobacco Use    Smoking status: Never Smoker    Smokeless tobacco: Never Used   Vaping Use    Vaping Use: Never used   Substance and Sexual Activity    Alcohol use: Yes     Comment: once a week    Drug use: Never    Sexual activity: Yes     Partners: Male   Other Topics Concern    Not on file   Social History Narrative    Not on file     Social Determinants of Health     Financial Resource Strain: Low Risk     Difficulty of Paying Living Expenses: Not hard at all   Food Insecurity: No Food Insecurity    Worried About Running Out of Food in the Last Year: Never true    [address] in the Last Year: Never true   Transportation Needs:     Lack of Transportation (Medical): Not on file    Lack of Transportation (Non-Medical):  Not on file   Physical Activity:     Days of Exercise per Week: Not on file    Minutes of Exercise per Session: Not on file   Stress:     Feeling of Stress : Not on file   Social Connections:     Frequency of Communication with Friends and Family: Not on file    Frequency of Social Gatherings with Friends and Family: Not on file    Attends Muslim Services: Not on file    Active Member of Clubs or Organizations: Not on file    Attends Club or Organization Meetings: Not on file    Marital Status: Not on file   Intimate Partner Violence:     Fear of Current or Ex-Partner: Not on file    Emotionally Abused: Not on file    Physically Abused: Not on file    Sexually Abused: Not on file   Housing Stability:     Unable to Pay for Housing in the Last Year: Not on file    Number of Places Lived in the Last Year: Not on file    Unstable Housing in the Last Year: Not on file       Review of Systems:  Review of Systems   Constitutional: Negative for fever. Cardiovascular: Negative for chest pain and palpitations. Musculoskeletal: Positive for back pain. Gastrointestinal: Negative for abdominal pain and bowel incontinence. Genitourinary: Negative for bladder incontinence and pelvic pain. Neurological: Negative for disturbances in coordination, headaches, loss of balance, numbness, tingling and weakness. Physical Exam:  There were no vitals taken for this visit. Physical Exam  HENT:      Head: Normocephalic. Pulmonary:      Effort: Pulmonary effort is normal.   Neurological:      Mental Status: She is alert.    Psychiatric:         Mood and Affect: Mood normal.         Behavior: Behavior normal.         Record/Diagnostics Review:    FINDINGS:   BONES/ALIGNMENT: There is normal alignment of the lumbar spine.  There is no   acute fracture or listhesis.  Bone marrow signal intensity is normal.  There   is disc desiccation at L4-5 and L5-S1.  Type 1 degenerative endplate changes   are present at L5-S1.  There is no spondylolysis.       SPINAL CORD: The conus medullaris is normal in size and signal intensities   and terminates normally.       SOFT TISSUES: There is no paraspinal mass identified.       L1-L2: There is no disc bulge or protrusion present.  There is no significant   spinal canal stenosis or neural foraminal narrowing present.       L2-L3: There is no disc bulge or protrusion present.  There is no significant   spinal canal stenosis or neural foraminal narrowing present.       L3-L4: There is no disc bulge or protrusion present. Behzad Spatz is no significant   spinal canal stenosis or neural foraminal narrowing present.  There is   bilateral facet arthropathy.       L4-L5: There is a disc bulge present.  There is an annular tear at the   posterior margin of the disc.  Bilateral facet arthropathy is present. Behzad Spatz   is no significant spinal canal stenosis or neural foraminal narrowing.       L5-S1: There is a left paracentral disc extrusion demonstrating inferior   migration.  The disc extrusion measures 5 x 15 x 14 mm in AP by transverse by   craniocaudal dimension.  This demonstrates inferior migration, posterior   displacing the bilateral S1 nerve roots.  There is a concomitant disc bulge   and overall mild spinal canal stenosis.         Impression   1. Disc bulge with concomitant left paracentral disc extrusion at L5-S1   demonstrating inferior migration.  There is posterior displacement of the   bilateral S1 nerve roots and overall mild spinal canal stenosis. 2. Additional multilevel degenerative changes, as described above. Assessment:  Problem List Items Addressed This Visit     DDD (degenerative disc disease), lumbar    Lumbosacral spondylosis without myelopathy - Primary             Treatment Plan:  Patient reports ongoing relief following lumbar RFA  Denies concerns  Follow up in three months      I have reviewed the chief complaint and history of present illness (including ROS and PFSH) and vital documentation by my staff and I agree with their documentation and have added where applicable. Surinder Monge, was evaluated through a synchronous (real-time) audio-video encounter. The patient (or guardian if applicable) is aware that this is a billable service, which includes applicable co-pays. This Virtual Visit was conducted with patient's (and/or legal guardian's) consent.  The visit was conducted pursuant to the emergency declaration under the [facility] [address] waiver authority and the [employer] General Act. Patient identification was verified, and a caregiver was present when appropriate. The patient was located at home in a state where the provider was licensed to provide care. Total time spent for this encounter: Not billed by time    --BARRON Baig CNP on 5/12/2022 at 9:21 AM    An electronic signature was used to authenticate this note.

## 2022-06-29 DIAGNOSIS — Z01.419 WELL FEMALE EXAM WITH ROUTINE GYNECOLOGICAL EXAM: ICD-10-CM

## 2022-06-29 DIAGNOSIS — Z30.09 BIRTH CONTROL COUNSELING: ICD-10-CM

## 2022-06-29 NOTE — TELEPHONE ENCOUNTER
LOV: (VV) 4/25/22  LRF: 7/30/21  NA: 0 mychart message sent to schedule  Health Maintenance   Topic Date Due    Depression Monitoring  07/07/2022    Flu vaccine (Season Ended) 09/01/2022    Cervical cancer screen  08/06/2025    DTaP/Tdap/Td vaccine (2 - Td or Tdap) 03/02/2028    COVID-19 Vaccine  Completed    Hepatitis A vaccine  Aged Out    Hepatitis B vaccine  Aged Out    Hib vaccine  Aged Out    Meningococcal (ACWY) vaccine  Aged Out    Pneumococcal 0-64 years Vaccine  Aged Out    Varicella vaccine  Discontinued    Hepatitis C screen  Discontinued    HIV screen  Discontinued             (applicable per patient's age: Cancer Screenings, Depression Screening, Fall Risk Screening, Immunizations)    LDL Cholesterol (mg/dL)   Date Value   08/27/2020 66     AST (U/L)   Date Value   07/07/2021 47 (H)     ALT (U/L)   Date Value   07/07/2021 34 (H)     BUN (mg/dL)   Date Value   07/07/2021 17      (goal A1C is < 7)   (goal LDL is <100) need 30-50% reduction from baseline     BP Readings from Last 3 Encounters:   01/26/22 122/63   01/26/22 120/78   01/13/22 129/88    (goal /80)      All Future Testing planned in [facility]:  Lab Frequency Next Occurrence   FACET JOINT L/S SINGLE Once 09/24/2021   RADIOFREQUENCY L/S ADDITIONAL Once 09/24/2021   FACET JOINT L/S SINGLE Once 09/24/2021   RADIOFREQUENCY L/S ADDITIONAL Once 09/24/2021   EGD Once 01/27/2022       Next Visit Date:  Future Appointments   Date Time Provider Trey Pratt   6/30/2022  3:30 PM Te Bee MD [facility]   8/4/2022  8:20 AM Cher Carroll, APRN - CNP [address] [place] [address] Road            Patient Active Problem List:     Generalized anxiety disorder     Adult night terror     Panic attacks     Current mild episode of major depressive disorder without prior episode ([facility] [address]     Celiac disease     Gastritis without bleeding     DDD (degenerative disc disease), lumbar     Lumbosacral spondylosis without myelopathy

## 2022-06-30 ENCOUNTER — OFFICE VISIT (OUTPATIENT)
Dept: GASTROENTEROLOGY | Age: 32
End: 2022-06-30
Payer: COMMERCIAL

## 2022-06-30 ENCOUNTER — HOSPITAL ENCOUNTER (OUTPATIENT)
Age: 32
Discharge: HOME | End: 2022-06-30
Payer: COMMERCIAL

## 2022-06-30 VITALS
SYSTOLIC BLOOD PRESSURE: 128 MMHG | WEIGHT: 238.4 LBS | HEART RATE: 64 BPM | BODY MASS INDEX: 40.7 KG/M2 | HEIGHT: 64 IN | DIASTOLIC BLOOD PRESSURE: 90 MMHG

## 2022-06-30 DIAGNOSIS — K90.0 CELIAC DISEASE: Primary | ICD-10-CM

## 2022-06-30 DIAGNOSIS — K90.0 CELIAC DISEASE: ICD-10-CM

## 2022-06-30 PROCEDURE — 36415 COLL VENOUS BLD VENIPUNCTURE: CPT

## 2022-06-30 PROCEDURE — 82784 ASSAY IGA/IGD/IGG/IGM EACH: CPT

## 2022-06-30 PROCEDURE — 83516 IMMUNOASSAY NONANTIBODY: CPT

## 2022-06-30 PROCEDURE — 99213 OFFICE O/P EST LOW 20 MIN: CPT | Performed by: INTERNAL MEDICINE

## 2022-06-30 RX ORDER — NORGESTIMATE AND ETHINYL ESTRADIOL 7DAYSX3 28
KIT ORAL
Qty: 84 TABLET | Refills: 3 | Status: SHIPPED | OUTPATIENT
Start: 2022-06-30

## 2022-06-30 ASSESSMENT — ENCOUNTER SYMPTOMS
BLOOD IN STOOL: 0
VOICE CHANGE: 0
CHOKING: 0
TROUBLE SWALLOWING: 0
SHORTNESS OF BREATH: 0
ABDOMINAL PAIN: 0
NAUSEA: 0
RECTAL PAIN: 0
VOMITING: 0
DIARRHEA: 1
COUGH: 0
ABDOMINAL DISTENTION: 1
CONSTIPATION: 1
WHEEZING: 0
ANAL BLEEDING: 0

## 2022-06-30 NOTE — PROGRESS NOTES
GI FOLLOW UP    INTERVAL HISTORY:     Celiac disease        Chief Complaint   Patient presents with    Follow-up    EGD       1. Celiac disease          HISTORY OF PRESENT ILLNESS: Ms.Samantha BREANNA Barillas is a 33 y.o. female with a past history remarkable for DDD, RFA, chronic pain in the lower back requiring radiofrequency ablation, referred for evaluation of new diagnosis celiac disease with positive TTG IgA and GDA. Patient has been maintaining gluten-free diet for the last 4 months. No recent endoscopic evaluation. Clinical improvement reported by the patient. She previously had episode of dyspepsia bloating and loose bowel movements which is now significantly improved. Reports some unintentional weight gain. Referred for evaluation of the patient celiac disease and management. Denies any extraintestinal infestations, no rashes.        Smoker: None   Drinking history: Socially   Illicit drugs: none   Abdominal surgeries: None   Prior Colonoscopy: none   Prior EGD: None   FH of GI issues: Maternal GF-possible celiac disease. Past Medical,Family, and Social History reviewed and does contribute to the patient presenting condition. Patient's PMH/PSH,SH,PSYCH Hx, MEDs, ALLERGIES, and ROS were all reviewed and updated in the appropriate sections.     PAST MEDICAL HISTORY:  Past Medical History:   Diagnosis Date    Allergic rhinitis     Dermatitis     Hypoglycemia        Past Surgical History:   Procedure Laterality Date    PAIN MANAGEMENT PROCEDURE  Septemeber 2021    UPPER GASTROINTESTINAL ENDOSCOPY  01/26/2022    EGD BIOPSY     UPPER GASTROINTESTINAL ENDOSCOPY N/A 1/26/2022    EGD BIOPSY performed by Yovanny Ernandez MD at [facility]:    Current Outpatient Medications:     tiZANidine (ZANAFLEX) 2 MG tablet, Take 1 tablet by mouth every evening (Patient taking differently: Take 2 mg by mouth every 6 hours as needed ), Disp: 30 tablet, Rfl: 2    citalopram (CELEXA) 20 MG tablet, Take 1 tablet by mouth daily, Disp: 90 tablet, Rfl: 3    loratadine (CLARITIN REDITABS) 10 MG dissolvable tablet, Take 10 mg by mouth daily, Disp: , Rfl:     Norgestim-Eth Estrad Triphasic 0.18/0.215/0.25 MG-35 MCG TABS, TAKE 1 TABLET DAILY, Disp: 84 tablet, Rfl: 3    ALLERGIES:   Allergies   Allergen Reactions    Gluten Meal     Lactose Intolerance (Gi)     Valium Other (See Comments)     Hallucinations         FAMILY HISTORY: No family history on file. SOCIAL HISTORY:   Social History     Socioeconomic History    Marital status:      Spouse name: Not on file    Number of children: Not on file    Years of education: Not on file    Highest education level: Not on file   Occupational History    Not on file   Tobacco Use    Smoking status: Never Smoker    Smokeless tobacco: Never Used   Vaping Use    Vaping Use: Never used   Substance and Sexual Activity    Alcohol use: Yes     Comment: once a week    Drug use: Never    Sexual activity: Yes     Partners: Male   Other Topics Concern    Not on file   Social History Narrative    Not on file     Social Determinants of Health     Financial Resource Strain: Low Risk     Difficulty of Paying Living Expenses: Not hard at all   Food Insecurity: No Food Insecurity    Worried About Running Out of Food in the Last Year: Never true    [address] in the Last Year: Never true   Transportation Needs:     Lack of Transportation (Medical): Not on file    Lack of Transportation (Non-Medical):  Not on file   Physical Activity:     Days of Exercise per Week: Not on file    Minutes of Exercise per Session: Not on file   Stress:     Feeling of Stress : Not on file   Social Connections:     Frequency of Communication with Friends and Family: Not on file    Frequency of Social Gatherings with Friends and Family: Not on file    Attends Catholic Services: Not on file    Active Member of Clubs or Organizations: Not on file    Attends Club or Organization Meetings: Not on file    Marital Status: Not on file   Intimate Partner Violence:     Fear of Current or Ex-Partner: Not on file    Emotionally Abused: Not on file    Physically Abused: Not on file    Sexually Abused: Not on file   Housing Stability:     Unable to Pay for Housing in the Last Year: Not on file    Number of [place] in the Last Year: Not on file    Unstable Housing in the Last Year: Not on file       REVIEW OF SYSTEMS: A 12-point review of systems was obtained and pertinent positives and negatives were listed below. REVIEW OF SYSTEMS:     Constitutional: No fever, no chills, no lethargy, no weakness. HEENT:  No headache, otalgia, itchy eyes, nasal discharge or sore throat. Cardiac:  No chest pain, dyspnea, orthopnea or PND. Chest:   No cough, phlegm or wheezing. Abdomen:      Detailed by MA   Neuro:  No focal weakness, abnormal movements or seizure like activity. Skin:   No rashes, no itching. :   No hematuria, no pyuria, no dysuria, no flank pain. Extremities:  No swelling or joint pains. ROS was otherwise negative    Review of Systems   Constitutional: Negative for appetite change, fatigue and unexpected weight change. HENT: Negative for trouble swallowing and voice change. Eyes: Negative for visual disturbance. Respiratory: Negative for cough, choking, shortness of breath and wheezing. Cardiovascular: Negative for chest pain, palpitations and leg swelling. Gastrointestinal: Positive for abdominal distention, constipation and diarrhea. Negative for abdominal pain, anal bleeding, blood in stool, nausea, rectal pain and vomiting. Genitourinary: Negative for difficulty urinating. Neurological: Negative for dizziness, seizures, weakness, numbness and headaches.    Hematological: Does not bruise/bleed easily. Psychiatric/Behavioral: Positive for sleep disturbance. Negative for confusion. The patient is not nervous/anxious. PHYSICAL EXAMINATION: Vital signs reviewed per the nursing documentation. BP (!) 128/90   Pulse 64   Ht 5' 4\" (1.626 m)   Wt 238 lb 6.4 oz (108.1 kg)   BMI 40.92 kg/m²   Body mass index is 40.92 kg/m². Physical Exam    Physical Exam   Constitutional: Patient is oriented to person, place, and time. Patient appears well-developed and well-nourished. HENT:   Head: Normocephalic and atraumatic. Eyes: Pupils are equal, round, and reactive to light. EOM are normal.   Neck: Normal range of motion. Neck supple. No JVD present. No tracheal deviation present. No thyromegaly present. Cardiovascular: Normal rate, regular rhythm, normal heart sounds and intact distal pulses. Pulmonary/Chest: Effort normal and breath sounds normal. No stridor. No respiratory distress. He has no wheezes. He has no rales. He exhibits no tenderness. Abdominal: Soft. Bowel sounds are normal. He exhibits no distension and no mass. There is no tenderness. There is no rebound and no guarding. No hernia. Musculoskeletal: Normal range of motion. Lymphadenopathy:    Patient has no cervical adenopathy. Neurological: Patient is alert and oriented to person, place, and time. Psychiatric: Patient has a normal mood and affect.  Patient behavior is normal.       LABORATORY DATA: Reviewed  Lab Results   Component Value Date    WBC 8.2 07/07/2021    HGB 13.3 07/07/2021    HCT 46.0 07/07/2021    .8 (H) 07/07/2021     07/07/2021     (L) 07/07/2021    K 4.8 07/07/2021     07/07/2021    CO2 17 (L) 07/07/2021    BUN 17 07/07/2021    CREATININE 0.98 (H) 07/07/2021    LABALBU 3.5 07/07/2021    BILITOT 0.18 (L) 07/07/2021    ALKPHOS 58 07/07/2021    AST 47 (H) 07/07/2021    ALT 34 (H) 07/07/2021         Lab Results   Component Value Date    RBC 4.39 07/07/2021    HGB 13.3 07/07/2021    MCV 104.8 (H) 07/07/2021    MCH 30.3 07/07/2021    MCHC 28.9 07/07/2021    RDW 12.7 07/07/2021    MPV 11.8 07/07/2021    BASOPCT 1 07/07/2021    LYMPHSABS 2.25 07/07/2021    MONOSABS 0.58 07/07/2021    NEUTROABS 5.18 07/07/2021    EOSABS 0.09 07/07/2021    BASOSABS 0.06 07/07/2021         DIAGNOSTIC TESTING:     No results found. IMPRESSION: Honorio Malone is a 33 y.o. female with a past history remarkable for DDD, RFA, chronic pain in the lower back requiring radiofrequency ablation, referred for evaluation of new diagnosis celiac disease with positive TTG IgA and GDA. Patient has been maintaining gluten-free diet for the last 4 months. No recent endoscopic evaluation. Clinical improvement reported by the patient. She previously had episode of dyspepsia bloating and loose bowel movements which is now significantly improved. Reports some unintentional weight gain. Referred for evaluation of the patient celiac disease and management. Denies any extraintestinal infestations, no rashes.         Assessment  1. Celiac disease        Kennedy was seen today for follow-up and egd. Diagnoses and all orders for this visit:    Celiac disease-patient to remain on gluten-free diet, clinically doing well from GI standpoint. We will repeat serological markers. -     Celiac Disease Panel; Future    Clinically doing well from GI standpoint. Endoscopy findings discussed with the patient. RTC: 3 months. Additional comments: Thank you for allowing me to participate in the care of Ms. Sophie Ennis. For any further questions please do not hesitate to contact me. I have reviewed and agree with the ROS entered by the MA/LPN from today's encounter documented in a separate note.         Chaparrita Fuentes MD, MPH   Board Certified in Gastroenterology  Board Certified in [address] [place] [address] #: [phone]    this note is created with the assistance of a speech recognition program.  While intending to generate a document that actually reflects the content of the visit, the document can still have some errors including those of syntax and sound a like substitutions which may escape proof reading. It such instances, actual meaning can be extrapolated by contextual diversion.

## 2022-07-01 LAB
GLIADIN DEAMINIDATED PEPTIDE AB IGA: 6.5 U/ML
GLIADIN DEAMINIDATED PEPTIDE AB IGG: 9.8 U/ML
IGA: 323 MG/DL (ref 70–400)
TISSUE TRANSGLUTAMINASE IGA: 1.3 U/ML

## 2022-07-07 DIAGNOSIS — F41.1 GENERALIZED ANXIETY DISORDER: ICD-10-CM

## 2022-07-07 DIAGNOSIS — F32.0 CURRENT MILD EPISODE OF MAJOR DEPRESSIVE DISORDER WITHOUT PRIOR EPISODE (HCC): ICD-10-CM

## 2022-07-07 RX ORDER — CITALOPRAM 20 MG/1
20 TABLET ORAL DAILY
Qty: 90 TABLET | Refills: 1 | Status: SHIPPED | OUTPATIENT
Start: 2022-07-07 | End: 2022-08-04

## 2022-07-07 NOTE — TELEPHONE ENCOUNTER
LOV 1-7-22  LRF 7-13-21    Health Maintenance   Topic Date Due    Depression Monitoring  07/07/2022    Flu vaccine (1) 09/01/2022    Cervical cancer screen  08/06/2025    DTaP/Tdap/Td vaccine (2 - Td or Tdap) 03/02/2028    COVID-19 Vaccine  Completed    Hepatitis A vaccine  Aged Out    Hepatitis B vaccine  Aged Out    Hib vaccine  Aged Out    Meningococcal (ACWY) vaccine  Aged Out    Pneumococcal 0-64 years Vaccine  Aged Out    Varicella vaccine  Discontinued    Hepatitis C screen  Discontinued    HIV screen  Discontinued             (applicable per patient's age: Cancer Screenings, Depression Screening, Fall Risk Screening, Immunizations)    LDL Cholesterol (mg/dL)   Date Value   08/27/2020 66     AST (U/L)   Date Value   07/07/2021 47 (H)     ALT (U/L)   Date Value   07/07/2021 34 (H)     BUN (mg/dL)   Date Value   07/07/2021 17      (goal A1C is < 7)   (goal LDL is <100) need 30-50% reduction from baseline     BP Readings from Last 3 Encounters:   06/30/22 (!) 128/90   01/26/22 122/63   01/26/22 120/78    (goal /80)      All Future Testing planned in [facility]:  Lab Frequency Next Occurrence   FACET JOINT L/S SINGLE Once 09/24/2021   RADIOFREQUENCY L/S ADDITIONAL Once 09/24/2021   FACET JOINT L/S SINGLE Once 09/24/2021   RADIOFREQUENCY L/S ADDITIONAL Once 09/24/2021   EGD Once 01/27/2022       Next Visit Date:  Future Appointments   Date Time Provider Trey Pratt   8/4/2022  8:20 AM Andrew Membreno, APRN - CNP [facility]   9/30/2022  2:30 PM MD [facility]   10/6/2022  3:45 PM Joe Owen MD [address] [facility]            Patient Active Problem List:     Generalized anxiety disorder     Adult night terror     Panic attacks     Current mild episode of major depressive disorder without prior episode ([address]     Celiac disease     Gastritis without bleeding     DDD (degenerative disc disease), lumbar     Lumbosacral spondylosis without myelopathy

## 2022-07-14 ENCOUNTER — OFFICE VISIT (OUTPATIENT)
Dept: FAMILY MEDICINE CLINIC | Age: 32
End: 2022-07-14
Payer: COMMERCIAL

## 2022-07-14 VITALS
DIASTOLIC BLOOD PRESSURE: 84 MMHG | WEIGHT: 240 LBS | HEART RATE: 78 BPM | TEMPERATURE: 97.2 F | RESPIRATION RATE: 16 BRPM | SYSTOLIC BLOOD PRESSURE: 116 MMHG | BODY MASS INDEX: 41.2 KG/M2

## 2022-07-14 DIAGNOSIS — K90.0 CELIAC DISEASE: ICD-10-CM

## 2022-07-14 DIAGNOSIS — M79.7 FIBROMYALGIA: Primary | ICD-10-CM

## 2022-07-14 DIAGNOSIS — G89.29 CHRONIC BILATERAL LOW BACK PAIN, UNSPECIFIED WHETHER SCIATICA PRESENT: ICD-10-CM

## 2022-07-14 DIAGNOSIS — F41.1 GENERALIZED ANXIETY DISORDER: ICD-10-CM

## 2022-07-14 DIAGNOSIS — M51.36 LUMBAR DEGENERATIVE DISC DISEASE: ICD-10-CM

## 2022-07-14 DIAGNOSIS — M54.50 CHRONIC BILATERAL LOW BACK PAIN, UNSPECIFIED WHETHER SCIATICA PRESENT: ICD-10-CM

## 2022-07-14 DIAGNOSIS — F32.0 CURRENT MILD EPISODE OF MAJOR DEPRESSIVE DISORDER WITHOUT PRIOR EPISODE (HCC): ICD-10-CM

## 2022-07-14 DIAGNOSIS — M47.816 LUMBAR SPONDYLOSIS: ICD-10-CM

## 2022-07-14 DIAGNOSIS — R53.83 OTHER FATIGUE: ICD-10-CM

## 2022-07-14 DIAGNOSIS — M51.26 LUMBAR HERNIATED DISC: ICD-10-CM

## 2022-07-14 PROCEDURE — 99214 OFFICE O/P EST MOD 30 MIN: CPT | Performed by: PEDIATRICS

## 2022-07-14 RX ORDER — DULOXETIN HYDROCHLORIDE 20 MG/1
20 CAPSULE, DELAYED RELEASE ORAL DAILY
Qty: 30 CAPSULE | Refills: 2 | Status: SHIPPED | OUTPATIENT
Start: 2022-07-14 | End: 2022-09-03 | Stop reason: SDUPTHER

## 2022-07-14 SDOH — ECONOMIC STABILITY: FOOD INSECURITY: WITHIN THE PAST 12 MONTHS, YOU WORRIED THAT YOUR FOOD WOULD RUN OUT BEFORE YOU GOT MONEY TO BUY MORE.: NEVER TRUE

## 2022-07-14 SDOH — ECONOMIC STABILITY: FOOD INSECURITY: WITHIN THE PAST 12 MONTHS, THE FOOD YOU BOUGHT JUST DIDN'T LAST AND YOU DIDN'T HAVE MONEY TO GET MORE.: NEVER TRUE

## 2022-07-14 ASSESSMENT — ENCOUNTER SYMPTOMS
CHEST TIGHTNESS: 0
NAUSEA: 0
BACK PAIN: 1
CONSTIPATION: 0
DIARRHEA: 1
ABDOMINAL DISTENTION: 1
WHEEZING: 0
ABDOMINAL PAIN: 1
SORE THROAT: 0
COLOR CHANGE: 0
COUGH: 0
SHORTNESS OF BREATH: 0
VOMITING: 0

## 2022-07-14 ASSESSMENT — PATIENT HEALTH QUESTIONNAIRE - PHQ9
SUM OF ALL RESPONSES TO PHQ QUESTIONS 1-9: 5
SUM OF ALL RESPONSES TO PHQ QUESTIONS 1-9: 5
7. TROUBLE CONCENTRATING ON THINGS, SUCH AS READING THE NEWSPAPER OR WATCHING TELEVISION: 3
4. FEELING TIRED OR HAVING LITTLE ENERGY: 0
1. LITTLE INTEREST OR PLEASURE IN DOING THINGS: 0
SUM OF ALL RESPONSES TO PHQ QUESTIONS 1-9: 5
SUM OF ALL RESPONSES TO PHQ QUESTIONS 1-9: 5
8. MOVING OR SPEAKING SO SLOWLY THAT OTHER PEOPLE COULD HAVE NOTICED. OR THE OPPOSITE, BEING SO FIGETY OR RESTLESS THAT YOU HAVE BEEN MOVING AROUND A LOT MORE THAN USUAL: 0
10. IF YOU CHECKED OFF ANY PROBLEMS, HOW DIFFICULT HAVE THESE PROBLEMS MADE IT FOR YOU TO DO YOUR WORK, TAKE CARE OF THINGS AT HOME, OR GET ALONG WITH OTHER PEOPLE: 0
5. POOR APPETITE OR OVEREATING: 0
6. FEELING BAD ABOUT YOURSELF - OR THAT YOU ARE A FAILURE OR HAVE LET YOURSELF OR YOUR FAMILY DOWN: 0
9. THOUGHTS THAT YOU WOULD BE BETTER OFF DEAD, OR OF HURTING YOURSELF: 0
3. TROUBLE FALLING OR STAYING ASLEEP: 1
2. FEELING DOWN, DEPRESSED OR HOPELESS: 1
SUM OF ALL RESPONSES TO PHQ9 QUESTIONS 1 & 2: 1

## 2022-07-14 ASSESSMENT — SOCIAL DETERMINANTS OF HEALTH (SDOH): HOW HARD IS IT FOR YOU TO PAY FOR THE VERY BASICS LIKE FOOD, HOUSING, MEDICAL CARE, AND HEATING?: NOT HARD AT ALL

## 2022-08-04 ENCOUNTER — OFFICE VISIT (OUTPATIENT)
Dept: PAIN MANAGEMENT | Age: 32
End: 2022-08-04
Payer: COMMERCIAL

## 2022-08-04 VITALS — HEIGHT: 64 IN | BODY MASS INDEX: 40.87 KG/M2 | WEIGHT: 239.4 LBS

## 2022-08-04 DIAGNOSIS — M51.36 DDD (DEGENERATIVE DISC DISEASE), LUMBAR: Primary | ICD-10-CM

## 2022-08-04 DIAGNOSIS — M47.817 LUMBOSACRAL SPONDYLOSIS WITHOUT MYELOPATHY: ICD-10-CM

## 2022-08-04 PROCEDURE — 99212 OFFICE O/P EST SF 10 MIN: CPT | Performed by: NURSE PRACTITIONER

## 2022-08-04 ASSESSMENT — ENCOUNTER SYMPTOMS
CONSTIPATION: 0
COUGH: 0
BOWEL INCONTINENCE: 0
BACK PAIN: 1
SHORTNESS OF BREATH: 0

## 2022-08-04 NOTE — PROGRESS NOTES
Chief Complaint   Patient presents with    Back Pain         [facility]    Pt complains of back pain. MRI with degenerative changes at L4-5 and L5-S1. There is a disc bulge at L5-S1. She did see a neurosurgeon who recommended conservative therapies. She had lumbar RFA 10/2021 with significant relief. She continues to have moderate relief. Recently diagnosed with fibromyalgia by PCP. Started on cymbalta 20 mg QD and feels this is helping. Back Pain  This is a chronic problem. The current episode started more than 1 year ago. The problem occurs constantly. The problem has been gradually improving since onset. The pain is present in the lumbar spine. The quality of the pain is described as stabbing. The pain does not radiate. The pain is at a severity of 3/10. The pain is mild. The pain is Worse during the day. The symptoms are aggravated by bending, position, twisting, standing and sitting. Pertinent negatives include no bladder incontinence, bowel incontinence, chest pain or fever. She has tried chiropractic manipulation, heat, ice, muscle relaxant, walking, NSAIDs, bed rest and home exercises for the symptoms. The treatment provided significant relief. Patient denies any new neurological symptoms. No bowel or bladder incontinence, no weakness, and no falling.     Past Medical History:   Diagnosis Date    Allergic rhinitis     Dermatitis     Hypoglycemia        Past Surgical History:   Procedure Laterality Date    PAIN MANAGEMENT PROCEDURE  Septemeber 2021    UPPER GASTROINTESTINAL ENDOSCOPY  01/26/2022    EGD BIOPSY     UPPER GASTROINTESTINAL ENDOSCOPY N/A 1/26/2022    EGD BIOPSY performed by Joe Owen MD at [address] [place] [address] EXTRACTION         Allergies   Allergen Reactions    Gluten Meal     Lactose Intolerance (Gi)     Valium Other (See Comments)     Hallucinations           Current Outpatient Medications:     DULoxetine (CYMBALTA) 20 MG extended release capsule, Take 1 capsule by mouth daily, Disp: 30 capsule, Rfl: 2    citalopram (CELEXA) 20 MG tablet, Take 1 tablet by mouth daily, Disp: 90 tablet, Rfl: 1    Norgestim-Eth Estrad Triphasic 0.18/0.215/0.25 MG-35 MCG TABS, TAKE 1 TABLET DAILY, Disp: 84 tablet, Rfl: 3    tiZANidine (ZANAFLEX) 2 MG tablet, Take 1 tablet by mouth every evening (Patient taking differently: Take 2 mg by mouth every 6 hours as needed ), Disp: 30 tablet, Rfl: 2    loratadine (CLARITIN REDITABS) 10 MG dissolvable tablet, Take 10 mg by mouth daily, Disp: , Rfl:     No family history on file. Social History     Socioeconomic History    Marital status:      Spouse name: Not on file    Number of children: Not on file    Years of education: Not on file    Highest education level: Not on file   Occupational History    Not on file   Tobacco Use    Smoking status: Never    Smokeless tobacco: Never   Vaping Use    Vaping Use: Never used   Substance and Sexual Activity    Alcohol use: Yes     Comment: once a week    Drug use: Never    Sexual activity: Yes     Partners: Male   Other Topics Concern    Not on file   Social History Narrative    Not on file     Social Determinants of Health     Financial Resource Strain: Low Risk     Difficulty of Paying Living Expenses: Not hard at all   Food Insecurity: No Food Insecurity    Worried About Running Out of Food in the Last Year: Never true    Ran Out of Food in the Last Year: Never true   Transportation Needs: Not on file   Physical Activity: Not on file   Stress: Not on file   Social Connections: Not on file   Intimate Partner Violence: Not on file   Housing Stability: Not on file       Review of Systems:  Review of Systems   Constitutional: Negative for chills and fever. Cardiovascular:  Negative for chest pain and palpitations. Respiratory:  Negative for cough and shortness of breath. Musculoskeletal:  Positive for back pain and myalgias. Gastrointestinal:  Negative for bowel incontinence and constipation. Genitourinary:  Negative for bladder incontinence. Neurological:  Negative for disturbances in coordination and loss of balance. Physical Exam:  Ht 5' 4\" (1.626 m)   Wt 239 lb 6.4 oz (108.6 kg)   LMP  (LMP Unknown)   BMI 41.09 kg/m²     Physical Exam  HENT:      Head: Normocephalic. Pulmonary:      Effort: Pulmonary effort is normal.   Musculoskeletal:         General: Normal range of motion. Cervical back: Normal range of motion. Lumbar back: Tenderness present. Skin:     General: Skin is warm and dry. Neurological:      Mental Status: She is alert and oriented to person, place, and time. Record/Diagnostics Review:    FINDINGS:   BONES/ALIGNMENT: There is normal alignment of the lumbar spine. There is no   acute fracture or listhesis. Bone marrow signal intensity is normal.  There   is disc desiccation at L4-5 and L5-S1. Type 1 degenerative endplate changes   are present at L5-S1. There is no spondylolysis. SPINAL CORD: The conus medullaris is normal in size and signal intensities   and terminates normally. SOFT TISSUES: There is no paraspinal mass identified. L1-L2: There is no disc bulge or protrusion present. There is no significant   spinal canal stenosis or neural foraminal narrowing present. L2-L3: There is no disc bulge or protrusion present. There is no significant   spinal canal stenosis or neural foraminal narrowing present. L3-L4: There is no disc bulge or protrusion present. There is no significant   spinal canal stenosis or neural foraminal narrowing present. There is   bilateral facet arthropathy. L4-L5: There is a disc bulge present. There is an annular tear at the   posterior margin of the disc. Bilateral facet arthropathy is present. There   is no significant spinal canal stenosis or neural foraminal narrowing. L5-S1: There is a left paracentral disc extrusion demonstrating inferior   migration.   The disc extrusion measures 5 x 15 x 14 mm in AP by transverse by   craniocaudal dimension. This demonstrates inferior migration, posterior   displacing the bilateral S1 nerve roots. There is a concomitant disc bulge   and overall mild spinal canal stenosis. Impression   1. Disc bulge with concomitant left paracentral disc extrusion at L5-S1   demonstrating inferior migration. There is posterior displacement of the   bilateral S1 nerve roots and overall mild spinal canal stenosis. 2. Additional multilevel degenerative changes, as described above. Assessment:  Problem List Items Addressed This Visit       DDD (degenerative disc disease), lumbar - Primary    Lumbosacral spondylosis without myelopathy          Treatment Plan:  Patient reports pain is still well managed following lumbar RFA 10/2021  Recently diagnosed with fibromyalgia and started on Cymbalta - feels this is helping  Continues zanaflex PRN  Follow up in three months    I have reviewed the chief complaint and history of present illness (including ROS and [address]) and vital documentation by my staff and I agree with their documentation and have added where applicable.

## 2022-09-01 NOTE — TELEPHONE ENCOUNTER
LOV & LRF 7-14-22    Health Maintenance   Topic Date Due    Flu vaccine (1) 09/01/2022    Depression Monitoring  07/14/2023    Cervical cancer screen  08/06/2025    DTaP/Tdap/Td vaccine (2 - Td or Tdap) 03/02/2028    COVID-19 Vaccine  Completed    Hepatitis A vaccine  Aged Out    Hepatitis B vaccine  Aged Out    Hib vaccine  Aged Out    Meningococcal (ACWY) vaccine  Aged Out    Pneumococcal 0-64 years Vaccine  Aged Out    Varicella vaccine  Discontinued    Hepatitis C screen  Discontinued    HIV screen  Discontinued             (applicable per patient's age: Cancer Screenings, Depression Screening, Fall Risk Screening, Immunizations)    LDL Cholesterol (mg/dL)   Date Value   08/27/2020 66     AST (U/L)   Date Value   07/07/2021 47 (H)     ALT (U/L)   Date Value   07/07/2021 34 (H)     BUN (mg/dL)   Date Value   07/07/2021 17      (goal A1C is < 7)   (goal LDL is <100) need 30-50% reduction from baseline     BP Readings from Last 3 Encounters:   07/14/22 116/84   06/30/22 (!) 128/90   01/26/22 122/63    (goal /80)      All Future Testing planned in [facility]:  Lab Frequency Next Occurrence   FACET JOINT L/S SINGLE Once 09/24/2021   RADIOFREQUENCY L/S ADDITIONAL Once 09/24/2021   FACET JOINT L/S SINGLE Once 09/24/2021   RADIOFREQUENCY L/S ADDITIONAL Once 09/24/2021   EGD Once 01/27/2022       Next Visit Date:  Future Appointments   Date Time Provider Trey Dewitti   9/16/2022 12:00 PM MD Kendra George [facility]   10/6/2022  3:45 PM MD KRISTEN MejíaURG [facility]   11/11/2022  8:20 AM BARRON Partida - RODDY [place]            Patient Active Problem List:     Generalized anxiety disorder     Adult night terror     Panic attacks     Current mild episode of major depressive disorder without prior episode ([facility] [address]     Celiac disease     Gastritis without bleeding     DDD (degenerative disc disease), lumbar     Lumbosacral spondylosis without myelopathy

## 2022-09-03 RX ORDER — DULOXETIN HYDROCHLORIDE 20 MG/1
20 CAPSULE, DELAYED RELEASE ORAL DAILY
Qty: 90 CAPSULE | Refills: 1 | Status: SHIPPED | OUTPATIENT
Start: 2022-09-03 | End: 2022-10-05 | Stop reason: DRUGHIGH

## 2022-10-06 ENCOUNTER — HOSPITAL ENCOUNTER (OUTPATIENT)
Age: 32
Discharge: HOME | End: 2022-10-06
Payer: COMMERCIAL

## 2022-10-06 ENCOUNTER — OFFICE VISIT (OUTPATIENT)
Dept: GASTROENTEROLOGY | Age: 32
End: 2022-10-06
Payer: COMMERCIAL

## 2022-10-06 VITALS
SYSTOLIC BLOOD PRESSURE: 123 MMHG | BODY MASS INDEX: 40.8 KG/M2 | HEART RATE: 86 BPM | HEIGHT: 64 IN | WEIGHT: 239 LBS | DIASTOLIC BLOOD PRESSURE: 78 MMHG

## 2022-10-06 DIAGNOSIS — K90.0 CELIAC DISEASE: Primary | ICD-10-CM

## 2022-10-06 DIAGNOSIS — K90.0 CELIAC DISEASE: ICD-10-CM

## 2022-10-06 PROCEDURE — 82784 ASSAY IGA/IGD/IGG/IGM EACH: CPT

## 2022-10-06 PROCEDURE — 36415 COLL VENOUS BLD VENIPUNCTURE: CPT

## 2022-10-06 PROCEDURE — 99213 OFFICE O/P EST LOW 20 MIN: CPT | Performed by: INTERNAL MEDICINE

## 2022-10-06 PROCEDURE — 83516 IMMUNOASSAY NONANTIBODY: CPT

## 2022-10-06 ASSESSMENT — ENCOUNTER SYMPTOMS
CONSTIPATION: 1
ANAL BLEEDING: 0
VOMITING: 0
NAUSEA: 0
TROUBLE SWALLOWING: 0
RECTAL PAIN: 0
COUGH: 0
VOICE CHANGE: 0
ABDOMINAL DISTENTION: 1
CHOKING: 0
BLOOD IN STOOL: 0
SHORTNESS OF BREATH: 0
ABDOMINAL PAIN: 0
DIARRHEA: 1
WHEEZING: 0

## 2022-10-06 NOTE — PROGRESS NOTES
GI FOLLOW UP    INTERVAL HISTORY:     Celiac disease    Patient has maintained gluten-free diet, since markers appear to be declining    Chief Complaint   Patient presents with    Follow-up     Celiac diease         1. Celiac disease          HISTORY OF PRESENT ILLNESS: Ms.Samantha CARLOS [facility] is a 33 y.o. female with a past history remarkable for DDD, RFA, chronic pain in the lower back requiring radiofrequency ablation, referred for evaluation of new diagnosis celiac disease with positive TTG IgA and GDA. Patient has been maintaining gluten-free diet for the last 4 months. No recent endoscopic evaluation. Clinical improvement reported by the patient. She previously had episode of dyspepsia bloating and loose bowel movements which is now significantly improved. Reports some unintentional weight gain. Referred for evaluation of the patient celiac disease and management. Denies any extraintestinal infestations, no rashes. Smoker: None   Drinking history: Socially   Illicit drugs: none   Abdominal surgeries: None   Prior Colonoscopy: none   Prior EGD: None   FH of GI issues: Maternal GF-possible celiac disease. Past Medical,Family, and Social History reviewed and does contribute to the patient presenting condition. Patient's PMH/PSH,SH,PSYCH Hx, MEDs, ALLERGIES, and ROS were all reviewed and updated in the appropriate sections.     PAST MEDICAL HISTORY:  Past Medical History:   Diagnosis Date    Allergic rhinitis     Dermatitis     Hypoglycemia        Past Surgical History:   Procedure Laterality Date    PAIN MANAGEMENT PROCEDURE  Septemeber 2021    UPPER GASTROINTESTINAL ENDOSCOPY  01/26/2022    EGD BIOPSY     UPPER GASTROINTESTINAL ENDOSCOPY N/A 1/26/2022    EGD BIOPSY performed by Cruz Cuevas MD at [facility]:    Current Outpatient Medications:     DULoxetine (CYMBALTA) 30 MG extended release capsule, Take 1 capsule by mouth daily, Disp: 30 capsule, Rfl: 2    Norgestim-Eth Estrad Triphasic 0.18/0.215/0.25 MG-35 MCG TABS, TAKE 1 TABLET DAILY, Disp: 84 tablet, Rfl: 3    tiZANidine (ZANAFLEX) 2 MG tablet, Take 1 tablet by mouth every evening (Patient taking differently: Take 2 mg by mouth every 6 hours as needed), Disp: 30 tablet, Rfl: 2    loratadine (CLARITIN REDITABS) 10 MG dissolvable tablet, Take 10 mg by mouth daily, Disp: , Rfl:     ALLERGIES:   Allergies   Allergen Reactions    Gluten Meal     Lactose Intolerance (Gi)     Valium Other (See Comments)     Hallucinations         FAMILY HISTORY: No family history on file. SOCIAL HISTORY:   Social History     Socioeconomic History    Marital status:      Spouse name: Not on file    Number of children: Not on file    Years of education: Not on file    Highest education level: Not on file   Occupational History    Not on file   Tobacco Use    Smoking status: Never    Smokeless tobacco: Never   Vaping Use    Vaping Use: Never used   Substance and Sexual Activity    Alcohol use: Yes     Comment: once a week    Drug use: Never    Sexual activity: Yes     Partners: Male   Other Topics Concern    Not on file   Social History Narrative    Not on file     Social Determinants of Health     Financial Resource Strain: Low Risk     Difficulty of Paying Living Expenses: Not hard at all   Food Insecurity: No Food Insecurity    Worried About Running Out of Food in the Last Year: Never true    Ran Out of Food in the Last Year: Never true   Transportation Needs: Not on file   Physical Activity: Not on file   Stress: Not on file   Social Connections: Not on file   Intimate Partner Violence: Not on file   Housing Stability: Not on file       REVIEW OF SYSTEMS: A 12-point review of systems was obtained and pertinent positives and negatives were listed below.      REVIEW OF SYSTEMS: Constitutional: No fever, no chills, no lethargy, no weakness. HEENT:  No headache, otalgia, itchy eyes, nasal discharge or sore throat. Cardiac:  No chest pain, dyspnea, orthopnea or PND. Chest:   No cough, phlegm or wheezing. Abdomen:      Detailed by MA   Neuro:  No focal weakness, abnormal movements or seizure like activity. Skin:   No rashes, no itching. :   No hematuria, no pyuria, no dysuria, no flank pain. Extremities:  No swelling or joint pains. ROS was otherwise negative    Review of Systems   Constitutional:  Negative for appetite change, fatigue and unexpected weight change. HENT:  Negative for trouble swallowing and voice change. Eyes:  Negative for visual disturbance. Respiratory:  Negative for cough, choking, shortness of breath and wheezing. Cardiovascular:  Negative for chest pain, palpitations and leg swelling. Gastrointestinal:  Positive for abdominal distention, constipation and diarrhea. Negative for abdominal pain, anal bleeding, blood in stool, nausea, rectal pain and vomiting. Genitourinary:  Negative for difficulty urinating. Neurological:  Negative for dizziness, seizures, weakness, numbness and headaches. Hematological:  Does not bruise/bleed easily. Psychiatric/Behavioral:  Positive for sleep disturbance. Negative for confusion. The patient is not nervous/anxious. PHYSICAL EXAMINATION: Vital signs reviewed per the nursing documentation. /78 (Site: Left Lower Arm, Position: Sitting, Cuff Size: Small Adult)   Pulse 86   Ht 5' 4\" (1.626 m)   Wt 239 lb (108.4 kg)   BMI 41.02 kg/m²   Body mass index is 41.02 kg/m². Physical Exam    Physical Exam   Constitutional: Patient is oriented to person, place, and time. Patient appears well-developed and well-nourished. HENT:   Head: Normocephalic and atraumatic. Eyes: Pupils are equal, round, and reactive to light. EOM are normal.   Neck: Normal range of motion. Neck supple. No JVD present.  No tracheal deviation present. No thyromegaly present. Cardiovascular: Normal rate, regular rhythm, normal heart sounds and intact distal pulses. Pulmonary/Chest: Effort normal and breath sounds normal. No stridor. No respiratory distress. He has no wheezes. He has no rales. He exhibits no tenderness. Abdominal: Soft. Bowel sounds are normal. He exhibits no distension and no mass. There is no tenderness. There is no rebound and no guarding. No hernia. Musculoskeletal: Normal range of motion. Lymphadenopathy:    Patient has no cervical adenopathy. Neurological: Patient is alert and oriented to person, place, and time. Psychiatric: Patient has a normal mood and affect. Patient behavior is normal.       LABORATORY DATA: Reviewed  Lab Results   Component Value Date    WBC 8.2 07/07/2021    HGB 13.3 07/07/2021    HCT 46.0 07/07/2021    .8 (H) 07/07/2021     07/07/2021     (L) 07/07/2021    K 4.8 07/07/2021     07/07/2021    CO2 17 (L) 07/07/2021    BUN 17 07/07/2021    CREATININE 0.98 (H) 07/07/2021    LABALBU 3.5 07/07/2021    BILITOT 0.18 (L) 07/07/2021    ALKPHOS 58 07/07/2021    AST 47 (H) 07/07/2021    ALT 34 (H) 07/07/2021         Lab Results   Component Value Date    RBC 4.39 07/07/2021    HGB 13.3 07/07/2021    .8 (H) 07/07/2021    MCH 30.3 07/07/2021    MCHC 28.9 07/07/2021    RDW 12.7 07/07/2021    MPV 11.8 07/07/2021    BASOPCT 1 07/07/2021    LYMPHSABS 2.25 07/07/2021    MONOSABS 0.58 07/07/2021    NEUTROABS 5.18 07/07/2021    EOSABS 0.09 07/07/2021    BASOSABS 0.06 07/07/2021         DIAGNOSTIC TESTING:     No results found. IMPRESSION: Brendon Hatfield is a 33 y.o. female with a past history remarkable for DDD, RFA, chronic pain in the lower back requiring radiofrequency ablation, referred for evaluation of new diagnosis celiac disease with positive TTG IgA and GDA. Patient has been maintaining gluten-free diet for the last 4 months.   No recent endoscopic evaluation. Clinical improvement reported by the patient. She previously had episode of dyspepsia bloating and loose bowel movements which is now significantly improved. Reports some unintentional weight gain. Referred for evaluation of the patient celiac disease and management. Denies any extraintestinal infestations, no rashes. Assessment  1. Celiac disease        Home Gan was seen today for follow-up and egd. Diagnoses and all orders for this visit:    Celiac disease-patient to remain on gluten-free diet, clinically doing well from GI standpoint. We will repeat serological markers. -     Celiac Disease Panel; Future    Clinically doing well from GI standpoint. Endoscopy findings discussed with the patient. RTC: 3 months. Additional comments: Thank you for allowing me to participate in the care of Ms. Cira Delgado. For any further questions please do not hesitate to contact me. I have reviewed and agree with the ROS entered by the MA/LPN from today's encounter documented in a separate note. Cecilia Gutierrez MD, MPH   Board Certified in Gastroenterology  Board Certified in [address] [place] [address] #: [phone]    this note is created with the assistance of a speech recognition program.  While intending to generate a document that actually reflects the content of the visit, the document can still have some errors including those of syntax and sound a like substitutions which may escape proof reading. It such instances, actual meaning can be extrapolated by contextual diversion.

## 2022-10-08 LAB
GLIADIN DEAMINIDATED PEPTIDE AB IGA: 4.2 U/ML
GLIADIN DEAMINIDATED PEPTIDE AB IGG: 7.4 U/ML
IGA: 333 MG/DL (ref 70–400)
TISSUE TRANSGLUTAMINASE IGA: 0.7 U/ML

## 2022-11-11 ENCOUNTER — OFFICE VISIT (OUTPATIENT)
Dept: PAIN MANAGEMENT | Age: 32
End: 2022-11-11
Payer: COMMERCIAL

## 2022-11-11 VITALS — WEIGHT: 239 LBS | BODY MASS INDEX: 40.8 KG/M2 | HEIGHT: 64 IN

## 2022-11-11 DIAGNOSIS — M79.7 FIBROMYALGIA: Chronic | ICD-10-CM

## 2022-11-11 DIAGNOSIS — M51.36 DDD (DEGENERATIVE DISC DISEASE), LUMBAR: ICD-10-CM

## 2022-11-11 DIAGNOSIS — M47.817 LUMBOSACRAL SPONDYLOSIS WITHOUT MYELOPATHY: Primary | ICD-10-CM

## 2022-11-11 PROCEDURE — 99212 OFFICE O/P EST SF 10 MIN: CPT | Performed by: NURSE PRACTITIONER

## 2022-11-11 ASSESSMENT — ENCOUNTER SYMPTOMS
CONSTIPATION: 0
BOWEL INCONTINENCE: 0
COUGH: 0
SHORTNESS OF BREATH: 0
BACK PAIN: 1

## 2022-11-11 NOTE — PROGRESS NOTES
Chief Complaint   Patient presents with    Back Pain         [facility]   Pt complains of back pain. MRI with degenerative changes at L4-5 and L5-S1. There is a disc bulge at L5-S1. She did see a neurosurgeon who recommended conservative therapies. She had lumbar RFA 10/2021 with significant relief. She continues to have moderate relief. Diagnosed with fibromyalgia by PCP. Taking cymbalta 30 mg QD and feels this is helping. She does have zanaflex from PCP to take PRN as well. Back Pain  This is a chronic problem. The current episode started more than 1 year ago. The problem occurs intermittently. The problem has been gradually improving since onset. The pain is present in the lumbar spine. The quality of the pain is described as aching and stabbing. The pain does not radiate. The pain is at a severity of 1/10. The pain is mild. The pain is Worse during the day. The symptoms are aggravated by bending, position, twisting, lying down, standing and sitting. Stiffness is present In the morning. Pertinent negatives include no bladder incontinence, bowel incontinence, chest pain or fever. She has tried chiropractic manipulation, heat, ice, bed rest, home exercises, analgesics, walking, muscle relaxant and NSAIDs for the symptoms. The treatment provided significant relief. Patient denies any new neurological symptoms. No bowel or bladder incontinence, no weakness, and no falling.       Past Medical History:   Diagnosis Date    Allergic rhinitis     Dermatitis     Hypoglycemia        Past Surgical History:   Procedure Laterality Date    PAIN MANAGEMENT PROCEDURE  Septemeber 2021    UPPER GASTROINTESTINAL ENDOSCOPY  01/26/2022    EGD BIOPSY     UPPER GASTROINTESTINAL ENDOSCOPY N/A 1/26/2022    EGD BIOPSY performed by Jasmyn Flores MD at [address] [place] [address] EXTRACTION         Allergies   Allergen Reactions    Gluten Meal     Lactose Intolerance (Gi)     Valium Other (See Comments)     Hallucinations Current Outpatient Medications:     DULoxetine (CYMBALTA) 30 MG extended release capsule, Take 1 capsule by mouth daily, Disp: 30 capsule, Rfl: 2    Norgestim-Eth Estrad Triphasic 0.18/0.215/0.25 MG-35 MCG TABS, TAKE 1 TABLET DAILY, Disp: 84 tablet, Rfl: 3    tiZANidine (ZANAFLEX) 2 MG tablet, Take 1 tablet by mouth every evening (Patient taking differently: Take 2 mg by mouth every 6 hours as needed), Disp: 30 tablet, Rfl: 2    loratadine (CLARITIN REDITABS) 10 MG dissolvable tablet, Take 10 mg by mouth daily, Disp: , Rfl:     No family history on file. Social History     Socioeconomic History    Marital status:      Spouse name: Not on file    Number of children: Not on file    Years of education: Not on file    Highest education level: Not on file   Occupational History    Not on file   Tobacco Use    Smoking status: Never    Smokeless tobacco: Never   Vaping Use    Vaping Use: Never used   Substance and Sexual Activity    Alcohol use: Yes     Comment: once a week    Drug use: Never    Sexual activity: Yes     Partners: Male   Other Topics Concern    Not on file   Social History Narrative    Not on file     Social Determinants of Health     Financial Resource Strain: Low Risk     Difficulty of Paying Living Expenses: Not hard at all   Food Insecurity: No Food Insecurity    Worried About Running Out of Food in the Last Year: Never true    Ran Out of Food in the Last Year: Never true   Transportation Needs: Not on file   Physical Activity: Not on file   Stress: Not on file   Social Connections: Not on file   Intimate Partner Violence: Not on file   Housing Stability: Not on file       Review of Systems:  Review of Systems   Constitutional: Negative for chills and fever. Cardiovascular:  Negative for chest pain and palpitations. Respiratory:  Negative for cough and shortness of breath. Musculoskeletal:  Positive for back pain and myalgias.    Gastrointestinal:  Negative for bowel incontinence and constipation. Genitourinary:  Negative for bladder incontinence. Neurological:  Negative for disturbances in coordination and loss of balance. Physical Exam:  Ht 5' 4\" (1.626 m)   Wt 239 lb (108.4 kg)   LMP 11/10/2022 (Exact Date)   BMI 41.02 kg/m²     Physical Exam  HENT:      Head: Normocephalic. Pulmonary:      Effort: Pulmonary effort is normal.   Musculoskeletal:         General: Normal range of motion. Cervical back: Normal range of motion. Lumbar back: Tenderness present. Skin:     General: Skin is warm and dry. Neurological:      Mental Status: She is alert and oriented to person, place, and time. Record/Diagnostics Review:    FINDINGS:   BONES/ALIGNMENT: There is normal alignment of the lumbar spine. There is no   acute fracture or listhesis. Bone marrow signal intensity is normal.  There   is disc desiccation at L4-5 and L5-S1. Type 1 degenerative endplate changes   are present at L5-S1. There is no spondylolysis. SPINAL CORD: The conus medullaris is normal in size and signal intensities   and terminates normally. SOFT TISSUES: There is no paraspinal mass identified. L1-L2: There is no disc bulge or protrusion present. There is no significant   spinal canal stenosis or neural foraminal narrowing present. L2-L3: There is no disc bulge or protrusion present. There is no significant   spinal canal stenosis or neural foraminal narrowing present. L3-L4: There is no disc bulge or protrusion present. There is no significant   spinal canal stenosis or neural foraminal narrowing present. There is   bilateral facet arthropathy. L4-L5: There is a disc bulge present. There is an annular tear at the   posterior margin of the disc. Bilateral facet arthropathy is present. There   is no significant spinal canal stenosis or neural foraminal narrowing.        L5-S1: There is a left paracentral disc extrusion demonstrating inferior migration. The disc extrusion measures 5 x 15 x 14 mm in AP by transverse by   craniocaudal dimension. This demonstrates inferior migration, posterior   displacing the bilateral S1 nerve roots. There is a concomitant disc bulge   and overall mild spinal canal stenosis. Impression   1. Disc bulge with concomitant left paracentral disc extrusion at L5-S1   demonstrating inferior migration. There is posterior displacement of the   bilateral S1 nerve roots and overall mild spinal canal stenosis. 2. Additional multilevel degenerative changes, as described above. Assessment:  Problem List Items Addressed This Visit       Fibromyalgia (Chronic)    DDD (degenerative disc disease), lumbar    Lumbosacral spondylosis without myelopathy - Primary          Treatment Plan:  Pt continues to have relief of back pain following RFA 10/2021  Consider aquatic therapy for fibromyalgia   Consider LDN in the future   Follow up in 6 months      I have reviewed the chief complaint and history of present illness (including ROS and PFSH) and vital documentation by my staff and I agree with their documentation and have added where applicable.

## 2022-12-12 ENCOUNTER — OFFICE VISIT (OUTPATIENT)
Dept: FAMILY MEDICINE CLINIC | Age: 32
End: 2022-12-12
Payer: COMMERCIAL

## 2022-12-12 VITALS
SYSTOLIC BLOOD PRESSURE: 116 MMHG | RESPIRATION RATE: 15 BRPM | WEIGHT: 242 LBS | HEIGHT: 64 IN | DIASTOLIC BLOOD PRESSURE: 82 MMHG | HEART RATE: 83 BPM | TEMPERATURE: 97 F | BODY MASS INDEX: 41.32 KG/M2

## 2022-12-12 DIAGNOSIS — M54.50 CHRONIC BILATERAL LOW BACK PAIN, UNSPECIFIED WHETHER SCIATICA PRESENT: ICD-10-CM

## 2022-12-12 DIAGNOSIS — M47.816 LUMBAR SPONDYLOSIS: ICD-10-CM

## 2022-12-12 DIAGNOSIS — M51.36 LUMBAR DEGENERATIVE DISC DISEASE: ICD-10-CM

## 2022-12-12 DIAGNOSIS — E73.9 LACTOSE INTOLERANCE: ICD-10-CM

## 2022-12-12 DIAGNOSIS — M51.26 LUMBAR HERNIATED DISC: ICD-10-CM

## 2022-12-12 DIAGNOSIS — F41.1 GENERALIZED ANXIETY DISORDER: ICD-10-CM

## 2022-12-12 DIAGNOSIS — K90.0 CELIAC DISEASE: ICD-10-CM

## 2022-12-12 DIAGNOSIS — G89.29 CHRONIC BILATERAL LOW BACK PAIN, UNSPECIFIED WHETHER SCIATICA PRESENT: ICD-10-CM

## 2022-12-12 DIAGNOSIS — F32.0 CURRENT MILD EPISODE OF MAJOR DEPRESSIVE DISORDER WITHOUT PRIOR EPISODE (HCC): ICD-10-CM

## 2022-12-12 DIAGNOSIS — M79.7 FIBROMYALGIA: Primary | ICD-10-CM

## 2022-12-12 PROCEDURE — 99214 OFFICE O/P EST MOD 30 MIN: CPT | Performed by: PEDIATRICS

## 2022-12-12 ASSESSMENT — ENCOUNTER SYMPTOMS
CHEST TIGHTNESS: 0
BACK PAIN: 1
COLOR CHANGE: 0
VOMITING: 0
ABDOMINAL DISTENTION: 1
DIARRHEA: 1
SHORTNESS OF BREATH: 0
CONSTIPATION: 0
ABDOMINAL PAIN: 1
NAUSEA: 0
WHEEZING: 0
COUGH: 0
SORE THROAT: 0

## 2023-01-02 RX ORDER — DULOXETIN HYDROCHLORIDE 30 MG/1
CAPSULE, DELAYED RELEASE ORAL
Qty: 30 CAPSULE | Refills: 2 | Status: SHIPPED | OUTPATIENT
Start: 2023-01-02

## 2023-01-10 ENCOUNTER — OFFICE VISIT (OUTPATIENT)
Dept: GASTROENTEROLOGY | Age: 33
End: 2023-01-10
Payer: COMMERCIAL

## 2023-01-10 ENCOUNTER — HOSPITAL ENCOUNTER (OUTPATIENT)
Age: 33
Discharge: HOME | End: 2023-01-10
Payer: COMMERCIAL

## 2023-01-10 VITALS
BODY MASS INDEX: 41.66 KG/M2 | DIASTOLIC BLOOD PRESSURE: 70 MMHG | SYSTOLIC BLOOD PRESSURE: 116 MMHG | HEIGHT: 64 IN | WEIGHT: 244 LBS | HEART RATE: 104 BPM | OXYGEN SATURATION: 97 %

## 2023-01-10 DIAGNOSIS — K90.0 CELIAC DISEASE: ICD-10-CM

## 2023-01-10 DIAGNOSIS — K90.0 CELIAC DISEASE: Primary | ICD-10-CM

## 2023-01-10 LAB
FOLATE: 14.6 NG/ML
IRON SATURATION: 21 % (ref 20–55)
IRON: 84 UG/DL (ref 37–145)
TOTAL IRON BINDING CAPACITY: 401 UG/DL (ref 250–450)
UNSATURATED IRON BINDING CAPACITY: 317 UG/DL (ref 112–347)
VITAMIN B-12: 486 PG/ML (ref 232–1245)
VITAMIN D 25-HYDROXY: 48.4 NG/ML

## 2023-01-10 PROCEDURE — 83540 ASSAY OF IRON: CPT

## 2023-01-10 PROCEDURE — 82784 ASSAY IGA/IGD/IGG/IGM EACH: CPT

## 2023-01-10 PROCEDURE — 82306 VITAMIN D 25 HYDROXY: CPT

## 2023-01-10 PROCEDURE — 83550 IRON BINDING TEST: CPT

## 2023-01-10 PROCEDURE — 83516 IMMUNOASSAY NONANTIBODY: CPT

## 2023-01-10 PROCEDURE — 36415 COLL VENOUS BLD VENIPUNCTURE: CPT

## 2023-01-10 PROCEDURE — 82746 ASSAY OF FOLIC ACID SERUM: CPT

## 2023-01-10 PROCEDURE — 82607 VITAMIN B-12: CPT

## 2023-01-10 PROCEDURE — 99213 OFFICE O/P EST LOW 20 MIN: CPT | Performed by: INTERNAL MEDICINE

## 2023-01-10 ASSESSMENT — ENCOUNTER SYMPTOMS
CHOKING: 0
BLOOD IN STOOL: 0
WHEEZING: 0
COUGH: 0
VOICE CHANGE: 0
TROUBLE SWALLOWING: 0
ANAL BLEEDING: 0
DIARRHEA: 1
RECTAL PAIN: 0
VOMITING: 0
CONSTIPATION: 1
ABDOMINAL PAIN: 0
SHORTNESS OF BREATH: 0
NAUSEA: 0
ABDOMINAL DISTENTION: 1

## 2023-01-10 NOTE — PROGRESS NOTES
GI FOLLOW UP    INTERVAL HISTORY:     Celiac disease    Patient has maintained gluten-free diet,  markers appear to be declining    Chief Complaint   Patient presents with    Celiac Disease       1. Celiac disease            HISTORY OF PRESENT ILLNESS: Ms.Samantha BREANNA Cross is a 33 y.o. female with a past history remarkable for DDD, RFA, chronic pain in the lower back requiring radiofrequency ablation, referred for evaluation of new diagnosis celiac disease with positive TTG IgA and GDA. Patient has been maintaining gluten-free diet for the last 4 months. No recent endoscopic evaluation. Clinical improvement reported by the patient. She previously had episode of dyspepsia bloating and loose bowel movements which is now significantly improved. Reports some unintentional weight gain. Referred for evaluation of the patient celiac disease and management. Denies any extraintestinal infestations, no rashes. Smoker: None   Drinking history: Socially   Illicit drugs: none   Abdominal surgeries: None   Prior Colonoscopy: none   Prior EGD: None   FH of GI issues: Maternal GF-possible celiac disease. Past Medical,Family, and Social History reviewed and does contribute to the patient presenting condition. Patient's PMH/PSH,SH,PSYCH Hx, MEDs, ALLERGIES, and ROS were all reviewed and updated in the appropriate sections.     PAST MEDICAL HISTORY:  Past Medical History:   Diagnosis Date    Allergic rhinitis     Dermatitis     Hypoglycemia        Past Surgical History:   Procedure Laterality Date    PAIN MANAGEMENT PROCEDURE  Septemeber 2021    UPPER GASTROINTESTINAL ENDOSCOPY  01/26/2022    EGD BIOPSY     UPPER GASTROINTESTINAL ENDOSCOPY N/A 1/26/2022    EGD BIOPSY performed by Yris Jacques MD at [facility]:    Current Outpatient Medications:     DULoxetine (CYMBALTA) 30 MG extended release capsule, TAKE ONE CAPSULE BY MOUTH DAILY, Disp: 30 capsule, Rfl: 2    Norgestim-Eth Estrad Triphasic 0.18/0.215/0.25 MG-35 MCG TABS, TAKE 1 TABLET DAILY, Disp: 84 tablet, Rfl: 3    tiZANidine (ZANAFLEX) 2 MG tablet, Take 1 tablet by mouth every evening (Patient taking differently: Take 2 mg by mouth every 6 hours as needed), Disp: 30 tablet, Rfl: 2    loratadine (CLARITIN REDITABS) 10 MG dissolvable tablet, Take 10 mg by mouth daily, Disp: , Rfl:     ALLERGIES:   Allergies   Allergen Reactions    Gluten Meal     Lactose Intolerance (Gi)     Valium Other (See Comments)     Hallucinations         FAMILY HISTORY: No family history on file. SOCIAL HISTORY:   Social History     Socioeconomic History    Marital status:      Spouse name: Not on file    Number of children: Not on file    Years of education: Not on file    Highest education level: Not on file   Occupational History    Not on file   Tobacco Use    Smoking status: Never    Smokeless tobacco: Never   Vaping Use    Vaping Use: Never used   Substance and Sexual Activity    Alcohol use: Yes     Comment: once a week    Drug use: Never    Sexual activity: Yes     Partners: Male   Other Topics Concern    Not on file   Social History Narrative    Not on file     Social Determinants of Health     Financial Resource Strain: Low Risk     Difficulty of Paying Living Expenses: Not hard at all   Food Insecurity: No Food Insecurity    Worried About Running Out of Food in the Last Year: Never true    Ran Out of Food in the Last Year: Never true   Transportation Needs: Not on file   Physical Activity: Not on file   Stress: Not on file   Social Connections: Not on file   Intimate Partner Violence: Not on file   Housing Stability: Not on file       REVIEW OF SYSTEMS: A 12-point review of systems was obtained and pertinent positives and negatives were listed below.      REVIEW OF SYSTEMS:     Constitutional: No fever, no chills, no lethargy, no weakness. HEENT:  No headache, otalgia, itchy eyes, nasal discharge or sore throat. Cardiac:  No chest pain, dyspnea, orthopnea or PND. Chest:   No cough, phlegm or wheezing. Abdomen:      Detailed by MA   Neuro:  No focal weakness, abnormal movements or seizure like activity. Skin:   No rashes, no itching. :   No hematuria, no pyuria, no dysuria, no flank pain. Extremities:  No swelling or joint pains. ROS was otherwise negative    Review of Systems   Constitutional:  Negative for appetite change, fatigue and unexpected weight change. HENT:  Negative for trouble swallowing and voice change. Eyes:  Negative for visual disturbance. Respiratory:  Negative for cough, choking, shortness of breath and wheezing. Cardiovascular:  Negative for chest pain, palpitations and leg swelling. Gastrointestinal:  Positive for abdominal distention, constipation and diarrhea. Negative for abdominal pain, anal bleeding, blood in stool, nausea, rectal pain and vomiting. Genitourinary:  Negative for difficulty urinating. Neurological:  Negative for dizziness, seizures, weakness, numbness and headaches. Hematological:  Does not bruise/bleed easily. Psychiatric/Behavioral:  Positive for sleep disturbance. Negative for confusion. The patient is not nervous/anxious. PHYSICAL EXAMINATION: Vital signs reviewed per the nursing documentation. /70 (Site: Left Lower Arm, Position: Sitting, Cuff Size: Small Adult)   Pulse (!) 104   Ht 5' 4\" (1.626 m)   Wt 244 lb (110.7 kg)   SpO2 97%   BMI 41.88 kg/m²   Body mass index is 41.88 kg/m². Physical Exam    Physical Exam   Constitutional: Patient is oriented to person, place, and time. Patient appears well-developed and well-nourished. HENT:   Head: Normocephalic and atraumatic. Eyes: Pupils are equal, round, and reactive to light. EOM are normal.   Neck: Normal range of motion. Neck supple. No JVD present.  No tracheal deviation present. No thyromegaly present. Cardiovascular: Normal rate, regular rhythm, normal heart sounds and intact distal pulses. Pulmonary/Chest: Effort normal and breath sounds normal. No stridor. No respiratory distress. He has no wheezes. He has no rales. He exhibits no tenderness. Abdominal: Soft. Bowel sounds are normal. He exhibits no distension and no mass. There is no tenderness. There is no rebound and no guarding. No hernia. Musculoskeletal: Normal range of motion. Lymphadenopathy:    Patient has no cervical adenopathy. Neurological: Patient is alert and oriented to person, place, and time. Psychiatric: Patient has a normal mood and affect. Patient behavior is normal.       LABORATORY DATA: Reviewed  Lab Results   Component Value Date    WBC 8.2 07/07/2021    HGB 13.3 07/07/2021    HCT 46.0 07/07/2021    .8 (H) 07/07/2021     07/07/2021     (L) 07/07/2021    K 4.8 07/07/2021     07/07/2021    CO2 17 (L) 07/07/2021    BUN 17 07/07/2021    CREATININE 0.98 (H) 07/07/2021    LABALBU 3.5 07/07/2021    BILITOT 0.18 (L) 07/07/2021    ALKPHOS 58 07/07/2021    AST 47 (H) 07/07/2021    ALT 34 (H) 07/07/2021         Lab Results   Component Value Date    RBC 4.39 07/07/2021    HGB 13.3 07/07/2021    .8 (H) 07/07/2021    MCH 30.3 07/07/2021    MCHC 28.9 07/07/2021    RDW 12.7 07/07/2021    MPV 11.8 07/07/2021    BASOPCT 1 07/07/2021    LYMPHSABS 2.25 07/07/2021    MONOSABS 0.58 07/07/2021    NEUTROABS 5.18 07/07/2021    EOSABS 0.09 07/07/2021    BASOSABS 0.06 07/07/2021         DIAGNOSTIC TESTING:     No results found. IMPRESSION: Angel Hughes is a 33 y.o. female with a past history remarkable for DDD, RFA, chronic pain in the lower back requiring radiofrequency ablation, referred for evaluation of new diagnosis celiac disease with positive TTG IgA and GDA. Patient has been maintaining gluten-free diet for the last 4 months.   No recent endoscopic evaluation. Clinical improvement reported by the patient. She previously had episode of dyspepsia bloating and loose bowel movements which is now significantly improved. Reports some unintentional weight gain. Referred for evaluation of the patient celiac disease and management. Denies any extraintestinal infestations, no rashes. Assessment  1. Celiac disease          Racquel Corral was seen today for follow-up and egd. Diagnoses and all orders for this visit:    Celiac disease-patient to remain on gluten-free diet, clinically doing well from GI standpoint. We will repeat serological markers. -     Celiac Disease Panel; Future    Clinically doing well from GI standpoint. Endoscopy findings discussed with the patient. Clinically doing really well, will send for vitamin testing and iron panel to ensure normalization. RTC: 3 months. Additional comments: Thank you for allowing me to participate [facility]. For any further questions please do not hesitate to contact me. I have reviewed and agree with the ROS entered by the MA/LPN from today's encounter documented in a separate note. Willis Gautam MD, MPH   Board Certified in Gastroenterology  Board Certified in [address] [place] [address] #: [phone]    this note is created with the assistance of a speech recognition program.  While intending to generate a document that actually reflects the content of the visit, the document can still have some errors including those of syntax and sound a like substitutions which may escape proof reading. It such instances, actual meaning can be extrapolated by contextual diversion.

## 2023-01-11 LAB
GLIADIN DEAMINIDATED PEPTIDE AB IGA: 5.9 U/ML
GLIADIN DEAMINIDATED PEPTIDE AB IGG: 14 U/ML
IGA: 344 MG/DL (ref 70–400)
TISSUE TRANSGLUTAMINASE IGA: 1 U/ML

## 2023-03-12 ENCOUNTER — HOSPITAL ENCOUNTER (EMERGENCY)
Age: 33
Discharge: HOME | End: 2023-03-12
Attending: EMERGENCY MEDICINE
Payer: COMMERCIAL

## 2023-03-12 VITALS
HEART RATE: 88 BPM | HEIGHT: 64 IN | BODY MASS INDEX: 39.27 KG/M2 | SYSTOLIC BLOOD PRESSURE: 128 MMHG | TEMPERATURE: 98.5 F | OXYGEN SATURATION: 99 % | RESPIRATION RATE: 18 BRPM | DIASTOLIC BLOOD PRESSURE: 93 MMHG | WEIGHT: 230 LBS

## 2023-03-12 DIAGNOSIS — U07.1 COVID-19: Primary | ICD-10-CM

## 2023-03-12 LAB
ABSOLUTE EOS #: 0.04 K/UL (ref 0–0.4)
ABSOLUTE LYMPH #: 1.68 K/UL (ref 1–4.8)
ABSOLUTE MONO #: 0.58 K/UL (ref 0.1–1.2)
ALBUMIN SERPL-MCNC: 3.9 G/DL (ref 3.5–5.2)
ALBUMIN/GLOBULIN RATIO: 1.2 (ref 1–2.5)
ALP SERPL-CCNC: 83 U/L (ref 35–104)
ALT SERPL-CCNC: 29 U/L (ref 5–33)
ANION GAP SERPL CALCULATED.3IONS-SCNC: 12 MMOL/L (ref 9–17)
AST SERPL-CCNC: 29 U/L
BACTERIA: ABNORMAL
BASOPHILS # BLD: 0 % (ref 0–2)
BASOPHILS ABSOLUTE: 0.01 K/UL (ref 0–0.2)
BILIRUB SERPL-MCNC: 0.3 MG/DL (ref 0.3–1.2)
BILIRUBIN URINE: NEGATIVE
BUN SERPL-MCNC: 10 MG/DL (ref 6–20)
CALCIUM SERPL-MCNC: 9.1 MG/DL (ref 8.6–10.4)
CHLORIDE SERPL-SCNC: 104 MMOL/L (ref 98–107)
CO2 SERPL-SCNC: 22 MMOL/L (ref 20–31)
COLOR: YELLOW
CREAT SERPL-MCNC: 0.56 MG/DL (ref 0.5–0.9)
EOSINOPHILS RELATIVE PERCENT: 1 % (ref 1–4)
EPITHELIAL CELLS UA: ABNORMAL /HPF (ref 0–5)
GFR SERPL CREATININE-BSD FRML MDRD: >60 ML/MIN/1.73M2
GLUCOSE SERPL-MCNC: 100 MG/DL (ref 70–99)
GLUCOSE UR STRIP.AUTO-MCNC: NEGATIVE MG/DL
HCG QUALITATIVE: NEGATIVE
HCT VFR BLD AUTO: 43.5 % (ref 36–46)
HGB BLD-MCNC: 14 G/DL (ref 12–16)
KETONES UR STRIP.AUTO-MCNC: NEGATIVE MG/DL
LEUKOCYTE ESTERASE UR QL STRIP.AUTO: NEGATIVE
LYMPHOCYTES # BLD: 23 % (ref 24–44)
MCH RBC QN AUTO: 29.7 PG (ref 26–34)
MCHC RBC AUTO-ENTMCNC: 32.2 G/DL (ref 31–37)
MCV RBC AUTO: 92.4 FL (ref 80–100)
MONOCYTES # BLD: 8 % (ref 2–11)
NITRITE UR QL STRIP.AUTO: NEGATIVE
OTHER OBSERVATIONS UA: ABNORMAL
PDW BLD-RTO: 12.8 % (ref 12.5–15.4)
PLATELET # BLD AUTO: 304 K/UL (ref 140–450)
PMV BLD AUTO: 10.3 FL (ref 8–14)
POTASSIUM SERPL-SCNC: 4 MMOL/L (ref 3.7–5.3)
PROT SERPL-MCNC: 7.2 G/DL (ref 6.4–8.3)
PROT UR STRIP.AUTO-MCNC: 7 MG/DL (ref 5–8)
PROT UR STRIP.AUTO-MCNC: NEGATIVE MG/DL
RBC # BLD: 4.71 M/UL (ref 4–5.2)
RBC CLUMPS #/AREA URNS AUTO: ABNORMAL /HPF (ref 0–2)
SEG NEUTROPHILS: 68 % (ref 36–66)
SEGMENTED NEUTROPHILS ABSOLUTE COUNT: 4.91 K/UL (ref 1.8–7.7)
SODIUM SERPL-SCNC: 138 MMOL/L (ref 135–144)
SPECIFIC GRAVITY UA: 1.01 (ref 1–1.03)
TURBIDITY: CLEAR
URINE HGB: ABNORMAL
UROBILINOGEN, URINE: NORMAL
WBC # BLD AUTO: 7.2 K/UL (ref 3.5–11)
WBC UA: ABNORMAL /HPF (ref 0–5)

## 2023-03-12 PROCEDURE — 81001 URINALYSIS AUTO W/SCOPE: CPT

## 2023-03-12 PROCEDURE — 84703 CHORIONIC GONADOTROPIN ASSAY: CPT

## 2023-03-12 PROCEDURE — 2580000003 HC RX 258: Performed by: EMERGENCY MEDICINE

## 2023-03-12 PROCEDURE — 36415 COLL VENOUS BLD VENIPUNCTURE: CPT

## 2023-03-12 PROCEDURE — 96374 THER/PROPH/DIAG INJ IV PUSH: CPT

## 2023-03-12 PROCEDURE — 96361 HYDRATE IV INFUSION ADD-ON: CPT

## 2023-03-12 PROCEDURE — 6360000002 HC RX W HCPCS: Performed by: EMERGENCY MEDICINE

## 2023-03-12 PROCEDURE — 85025 COMPLETE CBC W/AUTO DIFF WBC: CPT

## 2023-03-12 PROCEDURE — 99284 EMERGENCY DEPT VISIT MOD MDM: CPT

## 2023-03-12 PROCEDURE — 80053 COMPREHEN METABOLIC PANEL: CPT

## 2023-03-12 RX ORDER — 0.9 % SODIUM CHLORIDE 0.9 %
1000 INTRAVENOUS SOLUTION INTRAVENOUS ONCE
Status: COMPLETED | OUTPATIENT
Start: 2023-03-12 | End: 2023-03-12

## 2023-03-12 RX ORDER — ONDANSETRON 2 MG/ML
4 INJECTION INTRAMUSCULAR; INTRAVENOUS ONCE
Status: COMPLETED | OUTPATIENT
Start: 2023-03-12 | End: 2023-03-12

## 2023-03-12 RX ADMIN — SODIUM CHLORIDE 1000 ML: 9 INJECTION, SOLUTION INTRAVENOUS at 14:26

## 2023-03-12 RX ADMIN — ONDANSETRON 4 MG: 2 INJECTION INTRAMUSCULAR; INTRAVENOUS at 14:29

## 2023-03-12 ASSESSMENT — PAIN DESCRIPTION - FREQUENCY: FREQUENCY: INTERMITTENT

## 2023-03-12 ASSESSMENT — PAIN SCALES - GENERAL: PAINLEVEL_OUTOF10: 8

## 2023-03-12 ASSESSMENT — PAIN DESCRIPTION - LOCATION: LOCATION: GENERALIZED

## 2023-03-12 ASSESSMENT — PAIN DESCRIPTION - PAIN TYPE: TYPE: ACUTE PAIN

## 2023-03-12 ASSESSMENT — PAIN DESCRIPTION - DESCRIPTORS: DESCRIPTORS: ACHING

## 2023-03-12 NOTE — ED PROVIDER NOTES
[facility] Emergency Department  [id number] [facility] [address] [address]  Phone: [phone]  Fax: [phone]      Pt Name: Anastasia Hussein  DFO:[id number]  [place] 1990  Date of evaluation: 3/12/2023      CHIEF COMPLAINT       Chief Complaint   Patient presents with    Dizziness     X4 days. Covid+ 5 days. Weakness. Cough has improved    Nausea       HISTORY OF PRESENT ILLNESS   33-year-old female presents to the emergency department today complaint of exceptional fatigue nausea as well as lightheadedness. Today is day 6 for COVID. She was diagnosed 4 days ago. She was diagnosed in urgent care with similar symptoms including cough. She was given a cough suppressant which did improve her cough but she reports worsening lightheadedness dizziness generalized fatigue and generalized myalgias. She has had subjective fevers as well as chills. No diarrhea. Not getting up move around makes it feel worse. Nothing feel better. Is been no other contemporaneous evaluation or management. REVIEW OF SYSTEMS     Review of Systems   All other systems reviewed and are negative. PAST MEDICAL HISTORY    has a past medical history of Allergic rhinitis, Dermatitis, and Hypoglycemia. SURGICAL HISTORY      has a past surgical history that includes Hickman tooth extraction; Pain management procedure (Septemeber 2021); Upper gastrointestinal endoscopy (01/26/2022); and Upper gastrointestinal endoscopy (N/A, 1/26/2022). CURRENT MEDICATIONS       Current Discharge Medication List        CONTINUE these medications which have NOT CHANGED    Details   DULoxetine (CYMBALTA) 30 MG extended release capsule TAKE ONE CAPSULE BY MOUTH DAILY  Qty: 30 capsule, Refills: 2      Norgestim-Eth Estrad Triphasic 0.18/0.215/0.25 MG-35 MCG TABS TAKE 1 TABLET DAILY  Qty: 84 tablet, Refills: 3    Associated Diagnoses:  Well female exam with routine gynecological exam; Birth control counseling      tiZANidine (ZANAFLEX) 2 MG tablet Take 1 tablet by mouth every evening  Qty: 30 tablet, Refills: 2    Associated Diagnoses: DDD (degenerative disc disease), lumbar      loratadine (CLARITIN REDITABS) 10 MG dissolvable tablet Take 10 mg by mouth daily             ALLERGIES     is allergic to gluten meal, lactose intolerance (gi), and valium. FAMILY HISTORY     has no family status information on file. family history is not on file. SOCIAL HISTORY      reports that she has never smoked. She has never used smokeless tobacco. She reports current alcohol use. She reports that she does not use drugs. PHYSICAL EXAM     INITIAL VITALS:  height is 5' 4\" (1.626 m) and weight is 104.3 kg (230 lb). Her oral temperature is 98.5 °F (36.9 °C). Her blood pressure is 128/93 (abnormal) and her pulse is 88. Her respiration is 18 and oxygen saturation is 99%. Physical Exam  Vitals reviewed. Constitutional:       General: She is not in acute distress. Appearance: She is well-developed. She is obese. HENT:      Head: Normocephalic and atraumatic. Eyes:      Conjunctiva/sclera: Conjunctivae normal.      Pupils: Pupils are equal, round, and reactive to light. Neck:      Trachea: No tracheal deviation. Cardiovascular:      Rate and Rhythm: Normal rate and regular rhythm. Pulmonary:      Effort: No respiratory distress. Breath sounds: Normal breath sounds. Abdominal:      General: Bowel sounds are normal. There is no distension. Palpations: Abdomen is soft. Tenderness: There is no abdominal tenderness. Musculoskeletal:         General: No tenderness. Normal range of motion. Cervical back: Normal range of motion and neck supple. Skin:     General: Skin is warm and dry. Neurological:      Mental Status: She is alert and oriented to person, place, and time. Psychiatric:         Behavior: Behavior normal.         Thought Content:  Thought content normal.         Judgment: Judgment normal. DIFFERENTIAL DIAGNOSIS/ MDM:   I will hydrate the patient with IV fluids check baseline blood work ordered. I will do serial evaluations. DIAGNOSTIC RESULTS     EKG:  None    RADIOLOGY:   No results found.       LABS:  Results for orders placed or performed during the hospital encounter of 03/12/23   CBC with Auto Differential   Result Value Ref Range    WBC 7.2 3.5 - 11.0 k/uL    RBC 4.71 4.0 - 5.2 m/uL    Hemoglobin 14.0 12.0 - 16.0 g/dL    Hematocrit 43.5 36 - 46 %    MCV 92.4 80 - 100 fL    MCH 29.7 26 - 34 pg    MCHC 32.2 31 - 37 g/dL    RDW 12.8 12.5 - 15.4 %    Platelets 406 222 - 227 k/uL    MPV 10.3 8.0 - 14.0 fL    Seg Neutrophils 68 (H) 36 - 66 %    Lymphocytes 23 (L) 24 - 44 %    Monocytes 8 2 - 11 %    Eosinophils % 1 1 - 4 %    Basophils 0 0 - 2 %    Segs Absolute 4.91 1.8 - 7.7 k/uL    Absolute Lymph # 1.68 1.0 - 4.8 k/uL    Absolute Mono # 0.58 0.1 - 1.2 k/uL    Absolute Eos # 0.04 0.0 - 0.4 k/uL    Basophils Absolute 0.01 0.0 - 0.2 k/uL   CMP   Result Value Ref Range    Glucose 100 (H) 70 - 99 mg/dL    BUN 10 6 - 20 mg/dL    Creatinine 0.56 0.50 - 0.90 mg/dL    Est, Glom Filt Rate >60 >60 mL/min/1.73m2    Calcium 9.1 8.6 - 10.4 mg/dL    Sodium 138 135 - 144 mmol/L    Potassium 4.0 3.7 - 5.3 mmol/L    Chloride 104 98 - 107 mmol/L    CO2 22 20 - 31 mmol/L    Anion Gap 12 9 - 17 mmol/L    Alkaline Phosphatase 83 35 - 104 U/L    ALT 29 5 - 33 U/L    AST 29 <32 U/L    Total Bilirubin 0.3 0.3 - 1.2 mg/dL    Total Protein 7.2 6.4 - 8.3 g/dL    Albumin 3.9 3.5 - 5.2 g/dL    Albumin/Globulin Ratio 1.2 1.0 - 2.5   Urinalysis with Reflex to Culture    Specimen: Urine, clean catch   Result Value Ref Range    Color, UA Yellow Yellow    Turbidity UA Clear Clear    Glucose, Ur NEGATIVE NEGATIVE    Bilirubin Urine NEGATIVE NEGATIVE    Ketones, Urine NEGATIVE NEGATIVE    Specific Gravity, UA 1.010 1.005 - 1.030    Urine Hgb MODERATE (A) NEGATIVE    pH, UA 7.0 5.0 - 8.0    Protein, UA NEGATIVE NEGATIVE Urobilinogen, Urine Normal Normal    Nitrite, Urine NEGATIVE NEGATIVE    Leukocyte Esterase, Urine NEGATIVE NEGATIVE   HCG Qualitative, Serum   Result Value Ref Range    hCG Qual NEGATIVE NEGATIVE   Microscopic Urinalysis   Result Value Ref Range    WBC, UA 5 TO 10 0 - 5 /HPF    RBC, UA 5 TO 10 0 - 2 /HPF    Epithelial Cells UA 10 TO 20 0 - 5 /HPF    Bacteria, UA MANY (A) None    Other Observations UA (A) NOT REQ. Utilizing a urinalysis as the only screening method to exclude a potential uropathogen can be unreliable in many patient populations. Rapid screening tests are less sensitive than culture and if UTI is a clinical possibility, culture should be considered despite a negative urinalysis. EMERGENCY DEPARTMENT COURSE:     Thepatient was given the following medications:  Orders Placed This Encounter   Medications    0.9 % sodium chloride bolus    ondansetron (ZOFRAN) injection 4 mg        Vitals:    Vitals:    03/12/23 1339   BP: (!) 128/93   Pulse: 88   Resp: 18   Temp: 98.5 °F (36.9 °C)   TempSrc: Oral   SpO2: 99%   Weight: 104.3 kg (230 lb)   Height: 5' 4\" (1.626 m)     -------------------------  BP: (!) 128/93, Temp: 98.5 °F (36.9 °C), Heart Rate: 88, Resp: 18      Re-evaluation Notes  4:18 PM EDT  Patient states that he feels a lot better on reevaluation. Objectively, she looks better. Supportive care instructions have been given and she will be discharged home.     CONSULTS:  None    CRITICAL CARE:   None    PROCEDURES:  None    FINAL IMPRESSION      1. COVID-19          DISPOSITION/PLAN   DISPOSITION Decision To Discharge 03/12/2023 04:18:43 PM      Condition on Disposition  Good    PATIENT REFERRED TO:  Robyn Domínguez MD  [address] [place] [address]  [phone]    Schedule an appointment as soon as possible for a visit in 3 days      DISCHARGE MEDICATIONS:  [unfilled]    (Please note that portions of this note were completed with a voice recognitionprogram.  Efforts were made to edit the dictations but occasionally words are mis-transcribed.)    Lee Curry MD MD, F.A.C.E.P, F.A.A.E.M  Emergency Physician Attending          Lee Curry MD  03/12/23 8996

## 2023-03-14 ENCOUNTER — TELEPHONE (OUTPATIENT)
Dept: FAMILY MEDICINE CLINIC | Age: 33
End: 2023-03-14

## 2023-03-14 NOTE — TELEPHONE ENCOUNTER
ED Follow up Call    Reason for ED visit:  COVID   Status:     improved    Did you call your PCP prior to going to the ED? No      Did you receive a discharge instructions from the Emergency Room? Yes  Review of Instructions:     Understands what to report/when to return?:  Yes   Understands discharge instructions?:  Yes   Following discharge instructions?:  Yes   If not why? Are there any new complaints of pain? No  New Pain Meds? No    Constipation prophylaxis needed? N/A    If you have a wound is the dressing clean, dry, and intact? N/A  Understands wound care regimen? N/A    Are there any other complaints/concerns that you wish to tell your provider? Patient states she has a follow up soon and feels better. FU appts/Provider:    Future Appointments   Date Time Provider Trey Pratt   4/12/2023  2:00 PM MD Kendra Chaney   4/19/2023  3:45 PM Mynor Shoemaker MD [facility]   5/12/2023  8:20 AM BARRON Do - CNP [facility]           New Medications?:   No      Medication Reconciliation by phone - No  Understands Medications? No  Taking Medications? No  Can you swallow your pills? Not Applicable    Any further needs in the home i.e. Equipment?   Not Applicable    Link to services in community?:  N/A   Which services:

## 2023-03-31 RX ORDER — DULOXETIN HYDROCHLORIDE 30 MG/1
CAPSULE, DELAYED RELEASE ORAL
Qty: 30 CAPSULE | Refills: 2 | Status: SHIPPED | OUTPATIENT
Start: 2023-03-31

## 2023-03-31 NOTE — TELEPHONE ENCOUNTER
Last visit: 12/12/2022    Last Med refill: 1/2/2023  Does patient have enough medication for 72 hours: Yes    Next Visit Date:  Future Appointments   Date Time Provider Trey Pratt   4/12/2023  2:00 PM MD Kendra Del [facility]   4/19/2023  3:45 PM Vance Allison MD [facility]   5/12/2023  8:20 AM [address] [facility], APRN [facility] [address] [facility] Maintenance   Topic Date Due    COVID-19 Vaccine (4 - Booster for Moderna series) 03/07/2022    Flu vaccine (1) 08/01/2022    Depression Monitoring  07/14/2023    Cervical cancer screen  08/06/2025    DTaP/Tdap/Td vaccine (2 - Td or Tdap) 03/02/2028    Hepatitis A vaccine  Aged Out    Hib vaccine  Aged Out    Meningococcal (ACWY) vaccine  Aged Out    Pneumococcal 0-64 years Vaccine  Aged Out    Varicella vaccine  Discontinued    Hepatitis C screen  Discontinued    HIV screen  Discontinued       No results found for: LABA1C          ( goal A1C is < 7)   No results found for: LABMICR  LDL Cholesterol (mg/dL)   Date Value   08/27/2020 66   04/14/2015 46       (goal LDL is <100)   AST (U/L)   Date Value   03/12/2023 29     ALT (U/L)   Date Value   03/12/2023 29     BUN (mg/dL)   Date Value   03/12/2023 10     BP Readings from Last 3 Encounters:   03/12/23 (!) 128/93   01/10/23 116/70   12/12/22 116/82          (goal 120/80)    All Future Testing planned in [facility]  Lab Frequency Next Occurrence               Patient Active Problem List:     Generalized anxiety disorder     Adult night terror     Panic attacks     Current mild episode of major depressive disorder without prior episode (HCC)     Celiac disease     Gastritis without bleeding     DDD (degenerative disc disease), lumbar     Lumbosacral spondylosis without myelopathy     Fibromyalgia

## 2023-04-05 ENCOUNTER — PATIENT MESSAGE (OUTPATIENT)
Dept: FAMILY MEDICINE CLINIC | Age: 33
End: 2023-04-05

## 2023-04-06 RX ORDER — DULOXETIN HYDROCHLORIDE 60 MG/1
60 CAPSULE, DELAYED RELEASE ORAL DAILY
Qty: 90 CAPSULE | Refills: 1 | Status: SHIPPED | OUTPATIENT
Start: 2023-04-06 | End: 2024-04-06

## 2023-04-06 NOTE — TELEPHONE ENCOUNTER
Lets have Carl increase her Cymbalta to 60 mg once daily. Please send a prescription to me and let her know this is the plan and we will send this to the pharmacy for her.

## 2023-04-06 NOTE — TELEPHONE ENCOUNTER
From: Lili Gallardo  To: Dr. Lopez Plain: 4/5/2023 10:53 AM EDT  Subject: Duloxetine increase    Hi Dr. Thomas Leaks like to increase my duloxetine dosage if possible. I've been having trouble concentrating, and I'm stimming a lot more than I used to (finger tapping, leg bouncing, etc.). I've been feeling very overwhelmed recently with just about everything and last time a dosage increase helped. Thank you.   Carl

## 2023-04-27 ENCOUNTER — HOSPITAL ENCOUNTER (OUTPATIENT)
Age: 33
Discharge: HOME | End: 2023-04-27
Payer: COMMERCIAL

## 2023-04-27 DIAGNOSIS — Z00.00 ANNUAL PHYSICAL EXAM: ICD-10-CM

## 2023-04-27 LAB
ALBUMIN SERPL-MCNC: 3.9 G/DL (ref 3.5–5.2)
ALBUMIN/GLOBULIN RATIO: 1.1 (ref 1–2.5)
ALP SERPL-CCNC: 65 U/L (ref 35–104)
ALT SERPL-CCNC: 15 U/L (ref 5–33)
ANION GAP SERPL CALCULATED.3IONS-SCNC: 12 MMOL/L (ref 9–17)
AST SERPL-CCNC: 18 U/L
BILIRUB SERPL-MCNC: 0.3 MG/DL (ref 0.3–1.2)
BUN SERPL-MCNC: 9 MG/DL (ref 6–20)
CALCIUM SERPL-MCNC: 9.2 MG/DL (ref 8.6–10.4)
CHLORIDE SERPL-SCNC: 104 MMOL/L (ref 98–107)
CHOLEST SERPL-MCNC: 177 MG/DL
CHOLESTEROL/HDL RATIO: 3
CO2 SERPL-SCNC: 23 MMOL/L (ref 20–31)
CREAT SERPL-MCNC: 0.78 MG/DL (ref 0.5–0.9)
GFR SERPL CREATININE-BSD FRML MDRD: >60 ML/MIN/1.73M2
GLUCOSE SERPL-MCNC: 87 MG/DL (ref 70–99)
HCT VFR BLD AUTO: 43.4 % (ref 36.3–47.1)
HDLC SERPL-MCNC: 59 MG/DL
HGB BLD-MCNC: 13.6 G/DL (ref 11.9–15.1)
LDLC SERPL CALC-MCNC: 86 MG/DL (ref 0–130)
MCH RBC QN AUTO: 29.9 PG (ref 25.2–33.5)
MCHC RBC AUTO-ENTMCNC: 31.3 G/DL (ref 28.4–34.8)
MCV RBC AUTO: 95.4 FL (ref 82.6–102.9)
NRBC AUTOMATED: 0 PER 100 WBC
PDW BLD-RTO: 12.4 % (ref 11.8–14.4)
PLATELET # BLD AUTO: 369 K/UL (ref 138–453)
PMV BLD AUTO: 11.2 FL (ref 8.1–13.5)
POTASSIUM SERPL-SCNC: 4.5 MMOL/L (ref 3.7–5.3)
PROT SERPL-MCNC: 7.5 G/DL (ref 6.4–8.3)
RBC # BLD: 4.55 M/UL (ref 3.95–5.11)
SODIUM SERPL-SCNC: 139 MMOL/L (ref 135–144)
TRIGL SERPL-MCNC: 161 MG/DL
WBC # BLD AUTO: 6.3 K/UL (ref 3.5–11.3)

## 2023-04-27 PROCEDURE — 85027 COMPLETE CBC AUTOMATED: CPT

## 2023-04-27 PROCEDURE — 80061 LIPID PANEL: CPT

## 2023-04-27 PROCEDURE — 36415 COLL VENOUS BLD VENIPUNCTURE: CPT

## 2023-04-27 PROCEDURE — 80053 COMPREHEN METABOLIC PANEL: CPT

## 2023-05-25 ENCOUNTER — OFFICE VISIT (OUTPATIENT)
Dept: GASTROENTEROLOGY | Age: 33
End: 2023-05-25
Payer: COMMERCIAL

## 2023-05-25 VITALS
DIASTOLIC BLOOD PRESSURE: 92 MMHG | OXYGEN SATURATION: 100 % | HEART RATE: 92 BPM | HEIGHT: 64 IN | SYSTOLIC BLOOD PRESSURE: 129 MMHG | BODY MASS INDEX: 40.56 KG/M2 | WEIGHT: 237.6 LBS

## 2023-05-25 DIAGNOSIS — K90.0 CELIAC DISEASE: Primary | ICD-10-CM

## 2023-05-25 PROCEDURE — 99213 OFFICE O/P EST LOW 20 MIN: CPT | Performed by: INTERNAL MEDICINE

## 2023-05-25 RX ORDER — PIMECROLIMUS 10 MG/G
CREAM TOPICAL
COMMUNITY
Start: 2023-05-04

## 2023-05-25 ASSESSMENT — ENCOUNTER SYMPTOMS
CHOKING: 0
VOMITING: 0
ABDOMINAL DISTENTION: 1
CONSTIPATION: 1
WHEEZING: 0
DIARRHEA: 1
COUGH: 0
ANAL BLEEDING: 0
RECTAL PAIN: 0
ABDOMINAL PAIN: 0
TROUBLE SWALLOWING: 0
VOICE CHANGE: 0
NAUSEA: 0
BLOOD IN STOOL: 0
SHORTNESS OF BREATH: 0

## 2023-05-25 NOTE — PROGRESS NOTES
GI FOLLOW UP    INTERVAL HISTORY:     Celiac disease    Patient has maintained gluten-free diet,  markers appear to be declining    No chief complaint on file. No diagnosis found. HISTORY OF PRESENT ILLNESS: Fabienne Carreon is a 33 y.o. female with a past history remarkable for DDD, RFA, chronic pain in the lower back requiring radiofrequency ablation, referred for evaluation of new diagnosis celiac disease with positive TTG IgA and GDA. Patient has been maintaining gluten-free diet for the last 4 months. No recent endoscopic evaluation. Clinical improvement reported by the patient. She previously had episode of dyspepsia bloating and loose bowel movements which is now significantly improved. Reports some unintentional weight gain. Referred for evaluation of the patient celiac disease and management. Denies any extraintestinal infestations, no rashes. Smoker: None   Drinking history: Socially   Illicit drugs: none   Abdominal surgeries: None   Prior Colonoscopy: none   Prior EGD: None   FH of GI issues: Maternal GF-possible celiac disease. Past Medical,Family, and Social History reviewed and does contribute to the patient presenting condition. Patient's PMH/PSH,SH,PSYCH Hx, MEDs, ALLERGIES, and ROS were all reviewed and updated in the appropriate sections.     PAST MEDICAL HISTORY:  Past Medical History:   Diagnosis Date    Allergic rhinitis     Dermatitis     Hypoglycemia        Past Surgical History:   Procedure Laterality Date    PAIN MANAGEMENT PROCEDURE  Septemeber 2021    UPPER GASTROINTESTINAL ENDOSCOPY  01/26/2022    EGD BIOPSY     UPPER GASTROINTESTINAL ENDOSCOPY N/A 1/26/2022    EGD BIOPSY performed by Yanet Darden MD at [facility]:    Current Outpatient Medications:     DULoxetine (Garlan Dingwall)

## 2023-06-01 DIAGNOSIS — Z30.09 BIRTH CONTROL COUNSELING: ICD-10-CM

## 2023-06-01 DIAGNOSIS — Z01.419 WELL FEMALE EXAM WITH ROUTINE GYNECOLOGICAL EXAM: ICD-10-CM

## 2023-06-02 RX ORDER — NORGESTIMATE AND ETHINYL ESTRADIOL 7DAYSX3 28
KIT ORAL
Qty: 84 TABLET | Refills: 3 | Status: SHIPPED | OUTPATIENT
Start: 2023-06-02

## 2023-07-07 ENCOUNTER — HOSPITAL ENCOUNTER (OUTPATIENT)
Age: 33
Discharge: HOME | End: 2023-07-07
Payer: COMMERCIAL

## 2023-07-07 DIAGNOSIS — K90.0 CELIAC DISEASE: ICD-10-CM

## 2023-07-07 PROCEDURE — 36415 COLL VENOUS BLD VENIPUNCTURE: CPT

## 2023-07-07 PROCEDURE — 82784 ASSAY IGA/IGD/IGG/IGM EACH: CPT

## 2023-07-07 PROCEDURE — 83516 IMMUNOASSAY NONANTIBODY: CPT

## 2023-07-08 LAB
GLIADIN IGA SER IA-ACNC: NORMAL U/ML
GLIADIN IGG SER IA-ACNC: NORMAL U/ML
IGA SERPL-MCNC: 344 MG/DL (ref 70–400)
TTG IGA SER IA-ACNC: NORMAL U/ML

## 2023-07-11 LAB
GLIADIN IGA SER IA-ACNC: 5 U/ML
GLIADIN IGG SER IA-ACNC: 13 U/ML
IGA SERPL-MCNC: 344 MG/DL (ref 70–400)
TTG IGA SER IA-ACNC: 1.5 U/ML

## 2023-07-19 ENCOUNTER — TELEMEDICINE (OUTPATIENT)
Dept: FAMILY MEDICINE CLINIC | Age: 33
End: 2023-07-19
Payer: COMMERCIAL

## 2023-07-19 DIAGNOSIS — F90.2 ATTENTION DEFICIT HYPERACTIVITY DISORDER (ADHD), COMBINED TYPE: Primary | ICD-10-CM

## 2023-07-19 PROCEDURE — 99214 OFFICE O/P EST MOD 30 MIN: CPT | Performed by: PEDIATRICS

## 2023-07-19 RX ORDER — DEXTROAMPHETAMINE SACCHARATE, AMPHETAMINE ASPARTATE MONOHYDRATE, DEXTROAMPHETAMINE SULFATE AND AMPHETAMINE SULFATE 2.5; 2.5; 2.5; 2.5 MG/1; MG/1; MG/1; MG/1
10 CAPSULE, EXTENDED RELEASE ORAL DAILY
Qty: 30 CAPSULE | Refills: 0 | Status: SHIPPED | OUTPATIENT
Start: 2023-07-19 | End: 2023-09-18

## 2023-07-19 SDOH — ECONOMIC STABILITY: FOOD INSECURITY: WITHIN THE PAST 12 MONTHS, YOU WORRIED THAT YOUR FOOD WOULD RUN OUT BEFORE YOU GOT MONEY TO BUY MORE.: NEVER TRUE

## 2023-07-19 SDOH — ECONOMIC STABILITY: TRANSPORTATION INSECURITY
IN THE PAST 12 MONTHS, HAS LACK OF TRANSPORTATION KEPT YOU FROM MEETINGS, WORK, OR FROM GETTING THINGS NEEDED FOR DAILY LIVING?: NO

## 2023-07-19 SDOH — ECONOMIC STABILITY: FOOD INSECURITY: WITHIN THE PAST 12 MONTHS, THE FOOD YOU BOUGHT JUST DIDN'T LAST AND YOU DIDN'T HAVE MONEY TO GET MORE.: NEVER TRUE

## 2023-07-19 SDOH — ECONOMIC STABILITY: HOUSING INSECURITY
IN THE LAST 12 MONTHS, WAS THERE A TIME WHEN YOU DID NOT HAVE A STEADY PLACE TO SLEEP OR SLEPT IN A SHELTER (INCLUDING NOW)?: NO

## 2023-07-19 SDOH — ECONOMIC STABILITY: INCOME INSECURITY: HOW HARD IS IT FOR YOU TO PAY FOR THE VERY BASICS LIKE FOOD, HOUSING, MEDICAL CARE, AND HEATING?: NOT HARD AT ALL

## 2023-07-19 ASSESSMENT — ENCOUNTER SYMPTOMS
STRIDOR: 0
COLOR CHANGE: 0
WHEEZING: 0
COUGH: 0
SHORTNESS OF BREATH: 0

## 2023-07-19 NOTE — PROGRESS NOTES
Harjit Machado is here for evaluation for ADHD. female is {school status:11231}    DSM-IV Diagnostic Criteria for ADHD    Rating scale (Rare- 0, Occasional - 1, Frequent - 2)    Inattention:   Fails to give close attention to details or makes careless mistakes in schoolwork, work, or other activities: 2  Has difficulty sustaining attention in tasks or play activities:  2  Does not seem to listen when spoken to directly:  1  Does not follow through on instructions and fails to finish schoolwork, chores, or duties(not due to oppositional behavior or failure to understand instr):  1  Difficulty organizing tasks and activities:  0  Avoids, dislikes or is reluctant to engage in tasks that require sustained mental effort: 2  Loses things necessary for tasks or activities:   1  Easily distracted by extraneous stimuli:  2  Forgetful in daily activities:  2    InattentionTotal (questions with score of 1 or 2) (8/9):   Total points:13    Hyperactivity:  Fidgets with hands or feet and squirms in seat:  2  Leaves seat in classroom or in other situations in which remaining seated is expected :  0  Runs about or climbs excessively in situations in which it is inappropriate of feelings of restlessness:  0  Often has difficulty playing or engaging in leisure activities quietly:  1  \"On the go\" or acts as if \"drivern by a motor\":  2  Talks excessively:  2    Impulsivity:  Blurts out answers before questions have been completed:  2  Difficulty awaiting turn:  2  Interrupts or intrudes on others:  1    Hyperactive/ImpulsivityTotal (questions with score of 1 or 2) (7/9):  Total points:12    Some symptoms were present before 7years of age Yes  Symptoms present for at least 6 months Yes  Social impairment present in two or more setting    3 [email or web address] No   Other  No    Clinically significant impairment in functioning   Social No   Academic NA    Occupational Yes    Have you seen any other physician or provider since your last

## 2023-07-19 NOTE — PROGRESS NOTES
Nae Carlson (:  1990) is a Established patient, presenting virtually for evaluation of the following:    Assessment & Plan   Below is the assessment and plan developed based on review of pertinent history, physical exam, labs, studies, and medications. {There are no diagnoses linked to this encounter. (Refresh or delete this SmartLink)}  No follow-ups on file. Subjective   HPI  Review of Systems       Objective   Patient-Reported Vitals  No data recorded     Physical Exam  {Virtual visit PE with detailed exam Optional:298083090}       {Time Documentation Optional:676004760}    Nae Carlson, was evaluated through a synchronous (real-time) audio-video encounter. The patient (or guardian if applicable) is aware that this is a billable service, which includes applicable co-pays. This Virtual Visit was conducted with patient's (and/or legal guardian's) consent. Patient identification was verified, and a caregiver was present when appropriate.    The patient was located at {[employer] POS - Patient:[id number]}  Provider was located at {[facility] - Provider:[id number]}         --Mallika Verdugo

## 2023-07-19 NOTE — PROGRESS NOTES
Jennifer Holt (:  1990) is a Established patient, presenting virtually for evaluation of the following:    Assessment & Plan     Below is the assessment and plan developed based on review of pertinent history, physical exam, labs, studies, and medications. 1. Attention deficit hyperactivity disorder (ADHD), combined type  -     amphetamine-dextroamphetamine (ADDERALL XR) 10 MG extended release capsule; Take 1 capsule by mouth daily for 61 days. Max Daily Amount: 10 mg, Disp-30 capsule, R-0Normal      Proceed with trial of adderall as prescribed  Strict daily schedule recommended  Healthy diet and regular exercise encouraged  Good sleep hygiene recommended  Obtain POCT UDS at follow up appointment   CSM to be signed at next visit   Call with concerns       Return in about 4 weeks (around 2023) for routine follow up. Subjective     ADHD  Pertinent negatives include no chest pain, coughing or rash. Jennifer Holt is here for evaluation for ADHD. female is not in school     DSM-IV Diagnostic Criteria for ADHD     Rating scale (Rare- 0, Occasional - 1, Frequent - 2)     Inattention:   Fails to give close attention to details or makes careless mistakes in schoolwork, work, or other activities: 2  Has difficulty sustaining attention in tasks or play activities:  2  Does not seem to listen when spoken to directly:  1  Does not follow through on instructions and fails to finish schoolwork, chores, or duties(not due to oppositional behavior or failure to understand instr):  1  Difficulty organizing tasks and activities:  0  Avoids, dislikes or is reluctant to engage in tasks that require sustained mental effort: 2  Loses things necessary for tasks or activities:   1  Easily distracted by extraneous stimuli:  2  Forgetful in daily activities:  2     InattentionTotal (questions with score of 1 or 2) ():   Total points:13     Hyperactivity:  Fidgets with hands or feet and squirms in

## 2023-08-09 ENCOUNTER — OFFICE VISIT (OUTPATIENT)
Dept: FAMILY MEDICINE CLINIC | Age: 33
End: 2023-08-09
Payer: COMMERCIAL

## 2023-08-09 ENCOUNTER — HOSPITAL ENCOUNTER (OUTPATIENT)
Age: 33
Setting detail: SPECIMEN
Discharge: HOME | End: 2023-08-09

## 2023-08-09 VITALS
HEART RATE: 85 BPM | DIASTOLIC BLOOD PRESSURE: 74 MMHG | SYSTOLIC BLOOD PRESSURE: 112 MMHG | HEIGHT: 64 IN | TEMPERATURE: 97.2 F | BODY MASS INDEX: 39.95 KG/M2 | WEIGHT: 234 LBS

## 2023-08-09 DIAGNOSIS — Z01.419 ENCOUNTER FOR ROUTINE GYNECOLOGICAL EXAMINATION WITH PAPANICOLAOU SMEAR OF CERVIX: Primary | ICD-10-CM

## 2023-08-09 DIAGNOSIS — Z12.39 ENCOUNTER FOR SCREENING BREAST EXAMINATION AND DISCUSSION OF BREAST SELF EXAMINATION: ICD-10-CM

## 2023-08-09 DIAGNOSIS — Z12.89 ENCOUNTER FOR SCREENING FOR MALIGNANT NEOPLASM OF OTHER SITES: ICD-10-CM

## 2023-08-09 DIAGNOSIS — Z51.81 THERAPEUTIC DRUG MONITORING: ICD-10-CM

## 2023-08-09 DIAGNOSIS — N63.10 MASS OF RIGHT BREAST, UNSPECIFIED QUADRANT: ICD-10-CM

## 2023-08-09 DIAGNOSIS — F90.2 ATTENTION DEFICIT HYPERACTIVITY DISORDER (ADHD), COMBINED TYPE: ICD-10-CM

## 2023-08-09 PROCEDURE — 99212 OFFICE O/P EST SF 10 MIN: CPT | Performed by: PEDIATRICS

## 2023-08-09 PROCEDURE — 99395 PREV VISIT EST AGE 18-39: CPT | Performed by: PEDIATRICS

## 2023-08-09 ASSESSMENT — ENCOUNTER SYMPTOMS
COLOR CHANGE: 0
EYE REDNESS: 0
SHORTNESS OF BREATH: 0
BACK PAIN: 0
PHOTOPHOBIA: 0
VOMITING: 0
EYE PAIN: 0
DIARRHEA: 0
WHEEZING: 0
NAUSEA: 0
EYE ITCHING: 0
CONSTIPATION: 0
COUGH: 0
CHEST TIGHTNESS: 0
ABDOMINAL PAIN: 0
STRIDOR: 0

## 2023-08-09 NOTE — PROGRESS NOTES
2023    Mary Lou Soto (:  1990) is a 27 y.o. female, here for a preventive medicine evaluation. Patient presents today for routine gynecologic appointment. Her last pap was in  and this was normal with negative HPV testing. She is sexually active with 1 partner. She has no concerns for STDs. She does have regular menstrual cycles with some spotting often. She is taking an oral contraceptive and tolerating this well. Her  is looking into getting a vasectomy. They do not want children. She is performing an occasional breast exam and denies any breast concerns. I did find a hard rubbery lump in the right breast at the 2 o'clock position. This is not tender. She has no other gyn concerns. She was recently diagnosed with ADHD and she was started on Adderall XR 10mg once daily. She reports that she has definitely had significant improvement in her symptoms. She denies any side effects from her medication. She is due for CSM signing and a UDS. She has no other concerns at this time. cmw        Patient Active Problem List   Diagnosis    Generalized anxiety disorder    Adult night terror    Panic attacks    Current mild episode of major depressive disorder without prior episode (HCC)    Celiac disease    Gastritis without bleeding    DDD (degenerative disc disease), lumbar    Lumbosacral spondylosis without myelopathy    Fibromyalgia       Review of Systems   Constitutional:  Negative for appetite change, diaphoresis, fatigue and fever. Eyes:  Negative for photophobia, pain, redness and itching. Respiratory:  Negative for cough, chest tightness, shortness of breath, wheezing and stridor. Cardiovascular:  Negative for chest pain, palpitations and leg swelling. Gastrointestinal:  Negative for abdominal pain, constipation, diarrhea, nausea and vomiting. Genitourinary:  Negative for decreased urine volume, dysuria and frequency.    Musculoskeletal:

## 2023-08-10 LAB
AMPHET UR QL SCN: POSITIVE
BARBITURATES UR QL SCN: NEGATIVE
BENZODIAZ UR QL: NEGATIVE
CANNABINOIDS UR QL SCN: NEGATIVE
COCAINE UR QL SCN: NEGATIVE
FENTANYL UR QL: NEGATIVE
METHADONE UR QL: NEGATIVE
OPIATES UR QL SCN: NEGATIVE
OXYCODONE UR QL SCN: NEGATIVE
PCP UR QL SCN: NEGATIVE
TEST INFORMATION: ABNORMAL

## 2023-08-14 DIAGNOSIS — F90.2 ATTENTION DEFICIT HYPERACTIVITY DISORDER (ADHD), COMBINED TYPE: ICD-10-CM

## 2023-08-14 NOTE — TELEPHONE ENCOUNTER
LOV 8/9/2023     RTO 11/9/2023  LRF 7/19/2023          Controlled Substance Monitoring:    Acute and Chronic Pain Monitoring:   RX Monitoring 7/19/2023   Periodic Controlled Substance Monitoring No signs of potential drug abuse or diversion identified.

## 2023-08-15 RX ORDER — DEXTROAMPHETAMINE SACCHARATE, AMPHETAMINE ASPARTATE MONOHYDRATE, DEXTROAMPHETAMINE SULFATE AND AMPHETAMINE SULFATE 2.5; 2.5; 2.5; 2.5 MG/1; MG/1; MG/1; MG/1
10 CAPSULE, EXTENDED RELEASE ORAL DAILY
Qty: 30 CAPSULE | Refills: 0 | Status: SHIPPED | OUTPATIENT
Start: 2023-08-15 | End: 2023-10-15

## 2023-08-18 LAB — CYTOLOGY REPORT: NORMAL

## 2023-08-23 ENCOUNTER — PATIENT MESSAGE (OUTPATIENT)
Dept: FAMILY MEDICINE CLINIC | Age: 33
End: 2023-08-23

## 2023-08-23 DIAGNOSIS — F90.2 ATTENTION DEFICIT HYPERACTIVITY DISORDER (ADHD), COMBINED TYPE: Primary | ICD-10-CM

## 2023-08-30 ENCOUNTER — HOSPITAL ENCOUNTER (OUTPATIENT)
Dept: MAMMOGRAPHY | Age: 33
Discharge: HOME | End: 2023-09-01
Payer: COMMERCIAL

## 2023-08-30 ENCOUNTER — HOSPITAL ENCOUNTER (OUTPATIENT)
Dept: ULTRASOUND IMAGING | Age: 33
Discharge: HOME | End: 2023-09-01
Payer: COMMERCIAL

## 2023-08-30 VITALS — BODY MASS INDEX: 39.95 KG/M2 | WEIGHT: 234 LBS | HEIGHT: 64 IN

## 2023-08-30 DIAGNOSIS — N63.10 MASS OF RIGHT BREAST, UNSPECIFIED QUADRANT: ICD-10-CM

## 2023-08-30 PROCEDURE — G0279 TOMOSYNTHESIS, MAMMO: HCPCS

## 2023-08-30 PROCEDURE — 76642 ULTRASOUND BREAST LIMITED: CPT

## 2023-09-01 DIAGNOSIS — Z91.89 AT HIGH RISK FOR BREAST CANCER: Primary | ICD-10-CM

## 2023-09-11 NOTE — TELEPHONE ENCOUNTER
LOV 8-9-23   RTO 11-9-23  LRF 4-6-23          Controlled Substance Monitoring:    Acute and Chronic Pain Monitoring:   RX Monitoring Periodic Controlled Substance Monitoring   8/15/2023  10:12 AM No signs of potential drug abuse or diversion identified.

## 2023-09-12 RX ORDER — DULOXETIN HYDROCHLORIDE 60 MG/1
60 CAPSULE, DELAYED RELEASE ORAL DAILY
Qty: 90 CAPSULE | Refills: 1 | Status: SHIPPED | OUTPATIENT
Start: 2023-09-12 | End: 2024-09-12

## 2023-09-18 DIAGNOSIS — M51.36 DDD (DEGENERATIVE DISC DISEASE), LUMBAR: ICD-10-CM

## 2023-09-18 NOTE — TELEPHONE ENCOUNTER
LOV: 8/9/2023    RTO:   Future Appointments   Date Time Provider [address]   9/21/2023  3:00 PM Austin Heath MD [facility]   11/9/2023 11:30 AM MD Kendra Thapa [facility]     LRF: 4/25/22          Controlled Substance Monitoring:    Acute and Chronic Pain Monitoring:   RX Monitoring Periodic Controlled Substance Monitoring   8/15/2023  10:12 AM No signs of potential drug abuse or diversion identified.

## 2023-09-18 NOTE — TELEPHONE ENCOUNTER
From: Cintia Cook  To: Dr. Susie Sanchez: 8/23/2023 3:38 PM EDT  Subject: Adderall    Are there adverse emotional side effects from Adderall? I have not felt quite myself in the last 2 weeks. I'm having a hard time coping with my work load suddenly and have been breaking down wanting to cry randomly. I don't feel like I have additional stressors in my life than I did last month or the month before, so I was wondering if that could be a side effect of the medication? If so, is there something else I could try? I have just felt so apathetic and almost depressed.

## 2023-09-19 RX ORDER — TIZANIDINE 2 MG/1
2 TABLET ORAL EVERY EVENING
Qty: 30 TABLET | Refills: 2 | Status: SHIPPED | OUTPATIENT
Start: 2023-09-19

## 2023-09-19 RX ORDER — LISDEXAMFETAMINE DIMESYLATE CAPSULES 20 MG/1
20 CAPSULE ORAL DAILY
Qty: 30 CAPSULE | Refills: 0 | Status: SHIPPED | OUTPATIENT
Start: 2023-09-19 | End: 2024-09-19

## 2023-10-12 ENCOUNTER — OFFICE VISIT (OUTPATIENT)
Dept: GASTROENTEROLOGY | Age: 33
End: 2023-10-12
Payer: COMMERCIAL

## 2023-10-12 ENCOUNTER — HOSPITAL ENCOUNTER (OUTPATIENT)
Age: 33
Setting detail: SPECIMEN
Discharge: HOME | End: 2023-10-12

## 2023-10-12 VITALS
HEIGHT: 64 IN | BODY MASS INDEX: 40.46 KG/M2 | DIASTOLIC BLOOD PRESSURE: 89 MMHG | HEART RATE: 87 BPM | SYSTOLIC BLOOD PRESSURE: 135 MMHG | WEIGHT: 237 LBS

## 2023-10-12 DIAGNOSIS — K29.70 GASTRITIS WITHOUT BLEEDING, UNSPECIFIED CHRONICITY, UNSPECIFIED GASTRITIS TYPE: ICD-10-CM

## 2023-10-12 DIAGNOSIS — K90.0 CELIAC DISEASE: ICD-10-CM

## 2023-10-12 DIAGNOSIS — K90.0 CELIAC DISEASE: Primary | ICD-10-CM

## 2023-10-12 LAB
GLIADIN IGA SER IA-ACNC: NORMAL U/ML
GLIADIN IGG SER IA-ACNC: NORMAL U/ML
IGA SERPL-MCNC: 356 MG/DL (ref 70–400)
TTG IGA SER IA-ACNC: NORMAL U/ML

## 2023-10-12 PROCEDURE — 99213 OFFICE O/P EST LOW 20 MIN: CPT | Performed by: INTERNAL MEDICINE

## 2023-10-12 ASSESSMENT — ENCOUNTER SYMPTOMS
CONSTIPATION: 1
ABDOMINAL PAIN: 0
COUGH: 0
VOMITING: 0
RECTAL PAIN: 0
WHEEZING: 0
TROUBLE SWALLOWING: 0
ABDOMINAL DISTENTION: 1
ANAL BLEEDING: 0
SHORTNESS OF BREATH: 0
CHOKING: 0
NAUSEA: 0
BLOOD IN STOOL: 0
DIARRHEA: 1
VOICE CHANGE: 0

## 2023-10-12 NOTE — PROGRESS NOTES
GI FOLLOW UP    INTERVAL HISTORY:     Celiac disease    Patient has maintained gluten-free diet,  markers appear to be declining    Chief Complaint   Patient presents with    Follow-up    Celiac Disease       1. Celiac disease    2. Gastritis without bleeding, unspecified chronicity, unspecified gastritis type              HISTORY OF PRESENT ILLNESS: Ms.Samantha BREANNA Griffin is a 33 y.o. female with a past history remarkable for DDD, RFA, chronic pain in the lower back requiring radiofrequency ablation, referred for evaluation of new diagnosis celiac disease with positive TTG IgA and GDA. Patient has been maintaining gluten-free diet for the last 4 months. No recent endoscopic evaluation. Clinical improvement reported by the patient. She previously had episode of dyspepsia bloating and loose bowel movements which is now significantly improved. Reports some unintentional weight gain. Referred for evaluation of the patient celiac disease and management. Denies any extraintestinal infestations, no rashes. Smoker: None   Drinking history: Socially   Illicit drugs: none   Abdominal surgeries: None   Prior Colonoscopy: none   Prior EGD: None   FH of GI issues: Maternal GF-possible celiac disease. Past Medical,Family, and Social History reviewed and does contribute to the patient presenting condition. Patient's PMH/PSH,SH,PSYCH Hx, MEDs, ALLERGIES, and ROS were all reviewed and updated in the appropriate sections.     PAST MEDICAL HISTORY:  Past Medical History:   Diagnosis Date    Allergic rhinitis     Dermatitis     Hypoglycemia        Past Surgical History:   Procedure Laterality Date    PAIN MANAGEMENT PROCEDURE  Septemeber 2021    UPPER GASTROINTESTINAL ENDOSCOPY  01/26/2022    EGD BIOPSY     UPPER GASTROINTESTINAL ENDOSCOPY N/A 1/26/2022    EGD BIOPSY performed by J Carlos Cornelius MD at [facility]

## 2023-10-15 DIAGNOSIS — F90.2 ATTENTION DEFICIT HYPERACTIVITY DISORDER (ADHD), COMBINED TYPE: ICD-10-CM

## 2023-10-16 NOTE — TELEPHONE ENCOUNTER
LOV: 8/9/2023    RTO:   Future Appointments   Date Time Provider [address]00  [address]   11/9/2023 11:30 AM MD Kendra Downey     LRF: 9/19/23          Controlled Substance Monitoring:    Acute and Chronic Pain Monitoring:   RX Monitoring Periodic Controlled Substance Monitoring   8/15/2023  10:12 AM No signs of potential drug abuse or diversion identified.

## 2023-10-17 LAB
GLIADIN IGA SER IA-ACNC: 5.2 U/ML
GLIADIN IGG SER IA-ACNC: 34 U/ML
IGA SERPL-MCNC: 356 MG/DL (ref 70–400)
TTG IGA SER IA-ACNC: 1.8 U/ML

## 2023-10-17 RX ORDER — LISDEXAMFETAMINE DIMESYLATE CAPSULES 20 MG/1
20 CAPSULE ORAL DAILY
Qty: 30 CAPSULE | Refills: 0 | Status: SHIPPED | OUTPATIENT
Start: 2023-10-17 | End: 2024-10-17

## 2023-11-09 ENCOUNTER — TELEMEDICINE (OUTPATIENT)
Dept: FAMILY MEDICINE CLINIC | Age: 33
End: 2023-11-09
Payer: COMMERCIAL

## 2023-11-09 DIAGNOSIS — F90.2 ATTENTION DEFICIT HYPERACTIVITY DISORDER (ADHD), COMBINED TYPE: Primary | ICD-10-CM

## 2023-11-09 PROCEDURE — 99213 OFFICE O/P EST LOW 20 MIN: CPT | Performed by: PEDIATRICS

## 2023-11-09 ASSESSMENT — ENCOUNTER SYMPTOMS
COUGH: 0
WHEEZING: 0
STRIDOR: 0
COLOR CHANGE: 0
SHORTNESS OF BREATH: 0

## 2023-11-09 NOTE — PROGRESS NOTES
Due   Topic Date Due    Flu vaccine (1) 08/01/2023         All Future Testing planned in [facility]  Lab Frequency Next Occurrence   PAP Smear Once 08/09/2023         Health Maintenance Due   Topic Date Due    Flu vaccine (1) 08/01/2023       HPI notes:      Patient presents today for routine follow-up of ADHD. She was diagnosed with ADHD earlier this year and was started on Adderall XR 10 mg once daily. Initially she had improvement in her symptoms but then did realize that she had had some side effects including increased crying, feeling depressed and apathetic. She felt as though this was related to side effects from the Adderall and not necessarily worsening depression. We discontinued her Adderall at that time and started Vyvanse. She has been taking Vyvanse since late August and she does feel as though she is doing quite well. She states that she is able to concentrate a lot better at work and she does not have any of the side effects that she noted with Adderall. She states that she does still have some fidgety behaviors and does feel as though she self stimulates to control her impulsiveness. She does not wish to change her medication at this time. She does report that she is sleeping better. She does not feel depressed or anxious. cmw      Review of Systems   Respiratory:  Negative for cough, shortness of breath, wheezing and stridor. Cardiovascular:  Negative for chest pain, palpitations and leg swelling. Skin:  Negative for color change and rash. Hematological:  Negative for adenopathy. Does not bruise/bleed easily. Psychiatric/Behavioral:  Positive for decreased concentration (much improved with vyvanse). Negative for dysphoric mood, self-injury and sleep disturbance. The patient is nervous/anxious (occassional) and is hyperactive (improved - still with some impulsive behaviors).            Objective     Patient-Reported Vitals    No data recorded         [INSTRUCTIONS:  \"[x]\" Indicates

## 2023-11-16 DIAGNOSIS — F90.2 ATTENTION DEFICIT HYPERACTIVITY DISORDER (ADHD), COMBINED TYPE: ICD-10-CM

## 2023-11-16 NOTE — TELEPHONE ENCOUNTER
LOV 11-9-23   RTO 2-29-24  LRF 10-17-23          Controlled Substance Monitoring:    Acute and Chronic Pain Monitoring:   RX Monitoring Periodic Controlled Substance Monitoring   10/17/2023  10:44 PM No signs of potential drug abuse or diversion identified.

## 2023-11-17 RX ORDER — LISDEXAMFETAMINE DIMESYLATE CAPSULES 20 MG/1
20 CAPSULE ORAL DAILY
Qty: 30 CAPSULE | Refills: 0 | Status: SHIPPED | OUTPATIENT
Start: 2023-11-17 | End: 2024-11-17

## 2023-11-21 DIAGNOSIS — F90.2 ATTENTION DEFICIT HYPERACTIVITY DISORDER (ADHD), COMBINED TYPE: ICD-10-CM

## 2023-11-21 RX ORDER — LISDEXAMFETAMINE DIMESYLATE CAPSULES 20 MG/1
20 CAPSULE ORAL DAILY
Qty: 30 CAPSULE | Refills: 0 | Status: CANCELLED | OUTPATIENT
Start: 2023-11-21 | End: 2024-11-21

## 2023-11-22 RX ORDER — LISDEXAMFETAMINE DIMESYLATE CAPSULES 10 MG/1
20 CAPSULE ORAL DAILY
Qty: 60 CAPSULE | Refills: 0 | Status: SHIPPED | OUTPATIENT
Start: 2023-11-22 | End: 2023-12-22

## 2023-12-25 DIAGNOSIS — F90.2 ATTENTION DEFICIT HYPERACTIVITY DISORDER (ADHD), COMBINED TYPE: ICD-10-CM

## 2023-12-25 DIAGNOSIS — M51.36 DDD (DEGENERATIVE DISC DISEASE), LUMBAR: ICD-10-CM

## 2023-12-26 RX ORDER — TIZANIDINE 2 MG/1
2 TABLET ORAL EVERY EVENING
Qty: 30 TABLET | Refills: 0 | Status: SHIPPED | OUTPATIENT
Start: 2023-12-26

## 2023-12-26 RX ORDER — LISDEXAMFETAMINE DIMESYLATE CAPSULES 10 MG/1
20 CAPSULE ORAL DAILY
Qty: 60 CAPSULE | Refills: 0 | Status: SHIPPED | OUTPATIENT
Start: 2023-12-26 | End: 2024-01-25

## 2023-12-26 NOTE — TELEPHONE ENCOUNTER
LOV 11/9/23 telemedicine   LOV 4/12/23 OV  RTO 2/29/24 ADHD check  LRF 9/19/23          Controlled Substance Monitoring:    Acute and Chronic Pain Monitoring:   RX Monitoring Periodic Controlled Substance Monitoring   11/17/2023   5:47 PM No signs of potential drug abuse or diversion identified.

## 2024-01-08 DIAGNOSIS — F90.2 ATTENTION DEFICIT HYPERACTIVITY DISORDER (ADHD), COMBINED TYPE: ICD-10-CM

## 2024-01-09 RX ORDER — LISDEXAMFETAMINE DIMESYLATE CAPSULES 10 MG/1
20 CAPSULE ORAL DAILY
Qty: 60 CAPSULE | Refills: 0 | Status: SHIPPED | OUTPATIENT
Start: 2024-01-09 | End: 2024-02-08

## 2024-01-09 RX ORDER — LISDEXAMFETAMINE DIMESYLATE CAPSULES 10 MG/1
20 CAPSULE ORAL DAILY
Qty: 60 CAPSULE | Refills: 0 | Status: SHIPPED | OUTPATIENT
Start: 2024-01-09 | End: 2024-01-09 | Stop reason: RX

## 2024-01-09 NOTE — TELEPHONE ENCOUNTER
It looks like patient wants the medication sent to [facility] in [place] because they have the generic and [facility] does not.     Pharmacy changed.

## 2024-02-05 DIAGNOSIS — F90.2 ATTENTION DEFICIT HYPERACTIVITY DISORDER (ADHD), COMBINED TYPE: ICD-10-CM

## 2024-02-06 RX ORDER — LISDEXAMFETAMINE DIMESYLATE CAPSULES 10 MG/1
20 CAPSULE ORAL DAILY
Qty: 60 CAPSULE | Refills: 0 | Status: SHIPPED | OUTPATIENT
Start: 2024-02-06 | End: 2024-03-07

## 2024-02-06 NOTE — TELEPHONE ENCOUNTER
LOV 11/9/23 VV   RTO 2/29/24  LRF 1/9/24          Controlled Substance Monitoring:    Acute and Chronic Pain Monitoring:   RX Monitoring Periodic Controlled Substance Monitoring   11/17/2023   5:47 PM No signs of potential drug abuse or diversion identified.

## 2024-02-29 ENCOUNTER — OFFICE VISIT (OUTPATIENT)
Dept: FAMILY MEDICINE CLINIC | Age: 34
End: 2024-02-29
Payer: COMMERCIAL

## 2024-02-29 VITALS
DIASTOLIC BLOOD PRESSURE: 86 MMHG | SYSTOLIC BLOOD PRESSURE: 122 MMHG | TEMPERATURE: 97.9 F | OXYGEN SATURATION: 97 % | HEIGHT: 64 IN | WEIGHT: 241.8 LBS | BODY MASS INDEX: 41.28 KG/M2 | HEART RATE: 80 BPM

## 2024-02-29 DIAGNOSIS — F90.2 ATTENTION DEFICIT HYPERACTIVITY DISORDER (ADHD), COMBINED TYPE: Primary | ICD-10-CM

## 2024-02-29 DIAGNOSIS — F32.0 CURRENT MILD EPISODE OF MAJOR DEPRESSIVE DISORDER WITHOUT PRIOR EPISODE (HCC): ICD-10-CM

## 2024-02-29 DIAGNOSIS — M79.7 FIBROMYALGIA: ICD-10-CM

## 2024-02-29 DIAGNOSIS — M47.816 LUMBAR SPONDYLOSIS: ICD-10-CM

## 2024-02-29 DIAGNOSIS — M51.36 LUMBAR DEGENERATIVE DISC DISEASE: ICD-10-CM

## 2024-02-29 DIAGNOSIS — M54.50 CHRONIC BILATERAL LOW BACK PAIN, UNSPECIFIED WHETHER SCIATICA PRESENT: ICD-10-CM

## 2024-02-29 DIAGNOSIS — F41.1 GENERALIZED ANXIETY DISORDER: ICD-10-CM

## 2024-02-29 DIAGNOSIS — G89.29 CHRONIC BILATERAL LOW BACK PAIN, UNSPECIFIED WHETHER SCIATICA PRESENT: ICD-10-CM

## 2024-02-29 PROBLEM — F90.9 ADHD (ATTENTION DEFICIT HYPERACTIVITY DISORDER): Status: ACTIVE | Noted: 2023-07-12

## 2024-02-29 PROCEDURE — 99214 OFFICE O/P EST MOD 30 MIN: CPT | Performed by: PEDIATRICS

## 2024-02-29 ASSESSMENT — PATIENT HEALTH QUESTIONNAIRE - PHQ9
4. FEELING TIRED OR HAVING LITTLE ENERGY: 0
7. TROUBLE CONCENTRATING ON THINGS, SUCH AS READING THE NEWSPAPER OR WATCHING TELEVISION: 0
6. FEELING BAD ABOUT YOURSELF - OR THAT YOU ARE A FAILURE OR HAVE LET YOURSELF OR YOUR FAMILY DOWN: 0
SUM OF ALL RESPONSES TO PHQ QUESTIONS 1-9: 0
SUM OF ALL RESPONSES TO PHQ QUESTIONS 1-9: 0
3. TROUBLE FALLING OR STAYING ASLEEP: 0
10. IF YOU CHECKED OFF ANY PROBLEMS, HOW DIFFICULT HAVE THESE PROBLEMS MADE IT FOR YOU TO DO YOUR WORK, TAKE CARE OF THINGS AT HOME, OR GET ALONG WITH OTHER PEOPLE: 0
5. POOR APPETITE OR OVEREATING: 0
2. FEELING DOWN, DEPRESSED OR HOPELESS: 0
SUM OF ALL RESPONSES TO PHQ QUESTIONS 1-9: 0
SUM OF ALL RESPONSES TO PHQ9 QUESTIONS 1 & 2: 0
SUM OF ALL RESPONSES TO PHQ QUESTIONS 1-9: 0
9. THOUGHTS THAT YOU WOULD BE BETTER OFF DEAD, OR OF HURTING YOURSELF: 0
1. LITTLE INTEREST OR PLEASURE IN DOING THINGS: 0
8. MOVING OR SPEAKING SO SLOWLY THAT OTHER PEOPLE COULD HAVE NOTICED. OR THE OPPOSITE, BEING SO FIGETY OR RESTLESS THAT YOU HAVE BEEN MOVING AROUND A LOT MORE THAN USUAL: 0

## 2024-02-29 ASSESSMENT — ENCOUNTER SYMPTOMS
BACK PAIN: 1
SHORTNESS OF BREATH: 0
WHEEZING: 0
COUGH: 0
COLOR CHANGE: 0
STRIDOR: 0

## 2024-02-29 NOTE — PROGRESS NOTES
Noa Boucher (:  1990) is a 33 y.o. female,Established patient, here for evaluation of the following chief complaint(s):    ADHD         ASSESSMENT/PLAN:    1. Attention deficit hyperactivity disorder (ADHD), combined type  2. Fibromyalgia  3. Current mild episode of major depressive disorder without prior episode (HCC)  4. Generalized anxiety disorder  5. Chronic bilateral low back pain, unspecified whether sciatica present  6. Lumbar degenerative disc disease  7. Lumbar spondylosis      Continue Vyvanse at 20 mg once daily  Strict daily schedule recommended  Continue Cymbalta 60 mg once daily  Regular exercise encouraged  Healthy diet encouraged  Good sleep hygiene recommended  Continue counseling  Tizanidine as needed for lower back pain  Follow-up with pain management as needed  Call with concerns        Return in about 3 months (around 2024) for annual physical with ADHD follow up / 6 months for pap with ADHD follow up.         Subjective     HPI    Noa Boucher is here for evaluation for ADHD.  female is not in school    DSM-V Diagnostic Criteria for ADHD    Rating scale (Rare- 0, Occasional - 1, Frequent - 2)    Inattention:   Fails to give close attention to details or makes careless mistakes in schoolwork, work, or other activities: 0  Has difficulty sustaining attention is tasks or play activities:  0  Does not seem to listen when spoken to directly:  0  Does not follow through on instructions and fails to finish schoolwork, chores, or duties(not due to oppositional behavior or failure to understand instr): 0  Difficulty organizing tasks and activities:  0  Avoids, dislikes or is reluctant to engage in tasks that require sustained mental effort: 0  Loses things necessary for tasks or activities:   0  Easily distracted by extraneous stimuli:  1  Forgetful in daily activities:  1    InattentionTotal (questions with score of 1 or 2) (2):   Total

## 2024-03-10 DIAGNOSIS — F90.2 ATTENTION DEFICIT HYPERACTIVITY DISORDER (ADHD), COMBINED TYPE: ICD-10-CM

## 2024-03-11 DIAGNOSIS — F41.1 GENERALIZED ANXIETY DISORDER: Primary | ICD-10-CM

## 2024-03-11 RX ORDER — DULOXETIN HYDROCHLORIDE 60 MG/1
60 CAPSULE, DELAYED RELEASE ORAL DAILY
Qty: 90 CAPSULE | Refills: 3 | Status: SHIPPED | OUTPATIENT
Start: 2024-03-11

## 2024-03-11 NOTE — TELEPHONE ENCOUNTER
LOV 2/29/24   RTO 5/29/24  LRF 9/12/23          Controlled Substance Monitoring:    Acute and Chronic Pain Monitoring:   RX Monitoring Periodic Controlled Substance Monitoring   2/6/2024   5:48 PM No signs of potential drug abuse or diversion identified.

## 2024-03-11 NOTE — TELEPHONE ENCOUNTER
LOV 2/29/24   RTO 5/29/24  LRF 2/6/24          Controlled Substance Monitoring:    Acute and Chronic Pain Monitoring:   RX Monitoring Periodic Controlled Substance Monitoring   2/6/2024   5:48 PM No signs of potential drug abuse or diversion identified.

## 2024-03-12 RX ORDER — LISDEXAMFETAMINE DIMESYLATE CAPSULES 10 MG/1
20 CAPSULE ORAL DAILY
Qty: 60 CAPSULE | Refills: 0 | Status: SHIPPED | OUTPATIENT
Start: 2024-03-12 | End: 2024-04-11

## 2024-04-08 ENCOUNTER — HOSPITAL ENCOUNTER (OUTPATIENT)
Age: 34
Discharge: HOME | End: 2024-04-08
Payer: COMMERCIAL

## 2024-04-08 ENCOUNTER — OFFICE VISIT (OUTPATIENT)
Dept: GASTROENTEROLOGY | Age: 34
End: 2024-04-08
Payer: COMMERCIAL

## 2024-04-08 VITALS
SYSTOLIC BLOOD PRESSURE: 139 MMHG | DIASTOLIC BLOOD PRESSURE: 100 MMHG | BODY MASS INDEX: 41.2 KG/M2 | WEIGHT: 240 LBS | TEMPERATURE: 97.5 F

## 2024-04-08 DIAGNOSIS — F41.0 PANIC ATTACKS: ICD-10-CM

## 2024-04-08 DIAGNOSIS — F41.1 GENERALIZED ANXIETY DISORDER: ICD-10-CM

## 2024-04-08 DIAGNOSIS — K90.0 CELIAC DISEASE: ICD-10-CM

## 2024-04-08 DIAGNOSIS — M79.7 FIBROMYALGIA: Chronic | ICD-10-CM

## 2024-04-08 DIAGNOSIS — K90.0 CELIAC DISEASE: Primary | ICD-10-CM

## 2024-04-08 PROCEDURE — 83516 IMMUNOASSAY NONANTIBODY: CPT

## 2024-04-08 PROCEDURE — 82784 ASSAY IGA/IGD/IGG/IGM EACH: CPT

## 2024-04-08 PROCEDURE — 36415 COLL VENOUS BLD VENIPUNCTURE: CPT

## 2024-04-08 PROCEDURE — 99214 OFFICE O/P EST MOD 30 MIN: CPT | Performed by: INTERNAL MEDICINE

## 2024-04-08 ASSESSMENT — ENCOUNTER SYMPTOMS
BLOOD IN STOOL: 0
NAUSEA: 0
SORE THROAT: 0
COUGH: 0
CHOKING: 0
ABDOMINAL DISTENTION: 0
SHORTNESS OF BREATH: 0
TROUBLE SWALLOWING: 0
WHEEZING: 0
VOMITING: 0
DIARRHEA: 0
RECTAL PAIN: 0
CONSTIPATION: 0
VOICE CHANGE: 0
ANAL BLEEDING: 0
ABDOMINAL PAIN: 0

## 2024-04-08 NOTE — PROGRESS NOTES
GI CLINIC FOLLOW UP    NTERVAL HISTORY:   No referring provider defined for this encounter.    Chief Complaint   Patient presents with    Celiac Disease     Pt is here today for a f/u on Celiac Disease, previous Dr CHERYLE Currie pt last seen on 10/12/23 for celiac & gastritis.       1. Celiac disease    2. Fibromyalgia    3. Generalized anxiety disorder    4. Panic attacks      This patient seen my office for the first time in the past she has been seen and followed by Dr. Cliff Currie  She was seen in 2022  She carries a diagnosis of celiac sprue diagnosed on the basis of the celiac disease panel  Her endoscopy done at that time was negative for any flattening of the small bowel mucosa  However she is still on gluten-free diet completely and if she takes any gluten she has issues with abdominal pain weakness fatigue  She has mild to moderate obesity  Has any smoking alcohol abuse illicit drug usage her  Globin stable in the past  No known family history for ulcerative colitis or Crohn's disease  He denies any diarrhea significant heartburns trouble swallowing food nausea vomiting  Has history for some stress and anxiety issues  Previous records were reviewed with her    HISTORY OF PRESENT ILLNESS: Ms.Samantha BREANNA Boucher is a 33 y.o. female with a past history remarkable for , referred for evaluation of   Chief Complaint   Patient presents with    Celiac Disease     Pt is here today for a f/u on Celiac Disease, previous Dr CHERYLE Currie pt last seen on 10/12/23 for celiac & gastritis.   .    Past Medical,Family, and Social History reviewed and does contribute to the patient presenting condition.    Patient's PMH/PSH,SH,PSYCH Hx, MEDs, ALLERGIES, and ROS were all reviewed and updated in the appropriate sections.    PAST MEDICAL HISTORY:  Past Medical History:   Diagnosis Date    Allergic rhinitis     Dermatitis     Hypoglycemia        Past Surgical History:   Procedure Laterality Date    PAIN MANAGEMENT PROCEDURE

## 2024-04-10 DIAGNOSIS — F90.2 ATTENTION DEFICIT HYPERACTIVITY DISORDER (ADHD), COMBINED TYPE: ICD-10-CM

## 2024-04-10 LAB
GLIADIN IGA SER IA-ACNC: 5.2 U/ML
GLIADIN IGG SER IA-ACNC: 19 U/ML
IGA SERPL-MCNC: 345 MG/DL (ref 70–400)
TTG IGA SER IA-ACNC: 1.2 U/ML

## 2024-04-11 NOTE — TELEPHONE ENCOUNTER
LOV 2/29/24   RTO 5/29/24  LRF 3/12/24          Controlled Substance Monitoring:    Acute and Chronic Pain Monitoring:   RX Monitoring Periodic Controlled Substance Monitoring   3/12/2024   9:39 PM No signs of potential drug abuse or diversion identified.

## 2024-04-12 RX ORDER — LISDEXAMFETAMINE DIMESYLATE CAPSULES 10 MG/1
20 CAPSULE ORAL DAILY
Qty: 60 CAPSULE | Refills: 0 | Status: SHIPPED | OUTPATIENT
Start: 2024-04-12 | End: 2024-05-12

## 2024-05-02 DIAGNOSIS — Z01.419 WELL FEMALE EXAM WITH ROUTINE GYNECOLOGICAL EXAM: ICD-10-CM

## 2024-05-02 DIAGNOSIS — Z30.09 BIRTH CONTROL COUNSELING: ICD-10-CM

## 2024-05-02 RX ORDER — NORGESTIMATE AND ETHINYL ESTRADIOL 7DAYSX3 28
1 KIT ORAL DAILY
Qty: 84 TABLET | Refills: 3 | Status: SHIPPED | OUTPATIENT
Start: 2024-05-02

## 2024-05-02 NOTE — TELEPHONE ENCOUNTER
LOV 2/29/24   RTO 5/29/24  LRF 6/2/23          Controlled Substance Monitoring:    Acute and Chronic Pain Monitoring:   RX Monitoring Periodic Controlled Substance Monitoring   4/12/2024   7:51 PM No signs of potential drug abuse or diversion identified.            
oriented to person, place, time and situation

## 2024-05-03 ENCOUNTER — HOSPITAL ENCOUNTER (OUTPATIENT)
Age: 34
Discharge: HOME | End: 2024-05-03
Payer: COMMERCIAL

## 2024-05-03 DIAGNOSIS — K90.0 CELIAC DISEASE: ICD-10-CM

## 2024-05-03 DIAGNOSIS — F41.0 PANIC ATTACKS: ICD-10-CM

## 2024-05-03 DIAGNOSIS — M79.7 FIBROMYALGIA: Chronic | ICD-10-CM

## 2024-05-03 DIAGNOSIS — F41.1 GENERALIZED ANXIETY DISORDER: ICD-10-CM

## 2024-05-03 LAB
BASOPHILS # BLD: 0.08 K/UL (ref 0–0.2)
BASOPHILS NFR BLD: 1 % (ref 0–2)
EOSINOPHIL # BLD: 0.1 K/UL (ref 0–0.44)
EOSINOPHILS RELATIVE PERCENT: 1 % (ref 1–4)
ERYTHROCYTE [DISTWIDTH] IN BLOOD BY AUTOMATED COUNT: 12.6 % (ref 11.8–14.4)
HCT VFR BLD AUTO: 41.4 % (ref 36.3–47.1)
HGB BLD-MCNC: 13.3 G/DL (ref 11.9–15.1)
IMM GRANULOCYTES # BLD AUTO: <0.03 K/UL (ref 0–0.3)
IMM GRANULOCYTES NFR BLD: 0 %
LYMPHOCYTES NFR BLD: 2.35 K/UL (ref 1.1–3.7)
LYMPHOCYTES RELATIVE PERCENT: 29 % (ref 24–43)
MCH RBC QN AUTO: 30.3 PG (ref 25.2–33.5)
MCHC RBC AUTO-ENTMCNC: 32.1 G/DL (ref 28.4–34.8)
MCV RBC AUTO: 94.3 FL (ref 82.6–102.9)
MONOCYTES NFR BLD: 0.79 K/UL (ref 0.1–1.2)
MONOCYTES NFR BLD: 10 % (ref 3–12)
NEUTROPHILS NFR BLD: 59 % (ref 36–65)
NEUTS SEG NFR BLD: 4.74 K/UL (ref 1.5–8.1)
NRBC BLD-RTO: 0 PER 100 WBC
PLATELET # BLD AUTO: 352 K/UL (ref 138–453)
PMV BLD AUTO: 11.1 FL (ref 8.1–13.5)
RBC # BLD AUTO: 4.39 M/UL (ref 3.95–5.11)
WBC OTHER # BLD: 8.1 K/UL (ref 3.5–11.3)

## 2024-05-03 PROCEDURE — 36415 COLL VENOUS BLD VENIPUNCTURE: CPT

## 2024-05-03 PROCEDURE — 85025 COMPLETE CBC W/AUTO DIFF WBC: CPT

## 2024-05-14 DIAGNOSIS — F90.2 ATTENTION DEFICIT HYPERACTIVITY DISORDER (ADHD), COMBINED TYPE: ICD-10-CM

## 2024-05-14 RX ORDER — LISDEXAMFETAMINE DIMESYLATE CAPSULES 10 MG/1
20 CAPSULE ORAL DAILY
Qty: 60 CAPSULE | Refills: 0 | Status: SHIPPED | OUTPATIENT
Start: 2024-05-14 | End: 2024-06-13

## 2024-05-14 NOTE — TELEPHONE ENCOUNTER
LOV 2/29/24   RTO 5/29/24  LRF 4/12/24          Controlled Substance Monitoring:    Acute and Chronic Pain Monitoring:   RX Monitoring Periodic Controlled Substance Monitoring   4/12/2024   7:51 PM No signs of potential drug abuse or diversion identified.

## 2024-05-29 ENCOUNTER — OFFICE VISIT (OUTPATIENT)
Dept: FAMILY MEDICINE CLINIC | Age: 34
End: 2024-05-29
Payer: COMMERCIAL

## 2024-05-29 VITALS
WEIGHT: 243 LBS | SYSTOLIC BLOOD PRESSURE: 114 MMHG | HEART RATE: 94 BPM | DIASTOLIC BLOOD PRESSURE: 74 MMHG | TEMPERATURE: 98.9 F | BODY MASS INDEX: 41.71 KG/M2

## 2024-05-29 DIAGNOSIS — F90.2 ATTENTION DEFICIT HYPERACTIVITY DISORDER (ADHD), COMBINED TYPE: Primary | ICD-10-CM

## 2024-05-29 PROCEDURE — 99214 OFFICE O/P EST MOD 30 MIN: CPT | Performed by: PEDIATRICS

## 2024-05-29 RX ORDER — ATOMOXETINE 40 MG/1
40 CAPSULE ORAL DAILY
Qty: 30 CAPSULE | Refills: 2 | Status: SHIPPED | OUTPATIENT
Start: 2024-05-29

## 2024-05-29 NOTE — PROGRESS NOTES
Noa Boucher (:  1990) is a 33 y.o. female,Established patient, here for evaluation of the following chief complaint(s):    ADHD      Assessment & Plan     1. Attention deficit hyperactivity disorder (ADHD), combined type      Decrease Vyvanse to 10 mg once daily  Start Strattera at 40 mg once daily  Strict daily schedule recommended  Call with concerns      Return in about 3 months (around 2024) for routine follow up.       Subjective     HPI    Patient presents today for routine follow-up of ADHD.  She was originally diagnosed with ADHD in .  She had started on Adderall XR but had side effects including crying, depression and apathy.  We discontinued Adderall and started Vyvanse.  She did feel as though her concentration has been better since being on Vyvanse but still did have some difficulties with impulsiveness.  She presents today and complains of persistence of impulsive behaviors.  She still has to self stimulate frequently.  She states that she has to twirl her hair all the time and is always tapping her feet and bouncing her legs.  She states that she has been annoying others especially at work but now she is annoying herself.  She does feel as though this has been worse since taking Vyvanse.  We discussed medication changes and decided to add Strattera and decrease her Vyvanse slightly.  cmw      Review of Systems   Respiratory:  Negative for cough, shortness of breath, wheezing and stridor.    Cardiovascular:  Negative for chest pain, palpitations and leg swelling.   Musculoskeletal:  Positive for back pain (chronic) and myalgias (fibromyalgia controlled with cymbalta).   Skin:  Negative for color change and rash.   Hematological:  Negative for adenopathy. Does not bruise/bleed easily.   Psychiatric/Behavioral:  Positive for decreased concentration (much improved with vyvanse) and dysphoric mood (controlled with cymbalta). Negative for self-injury and sleep disturbance. The

## 2024-05-29 NOTE — PROGRESS NOTES
2024    Noa Boucher (:  1990) is a 33 y.o. female, here for a preventive medicine evaluation.    Subjective   Patient Active Problem List   Diagnosis    Generalized anxiety disorder    Adult night terror    Panic attacks    Current mild episode of major depressive disorder without prior episode (HCC)    Celiac disease    Gastritis without bleeding    DDD (degenerative disc disease), lumbar    Lumbosacral spondylosis without myelopathy    Fibromyalgia    ADHD (attention deficit hyperactivity disorder)       Review of Systems    Prior to Visit Medications    Medication Sig Taking? Authorizing Provider   lisdexamfetamine (VYVANSE) 10 MG capsule Take 2 capsules by mouth daily for 30 days. Max Daily Amount: 20 mg  Senia Talavera MD   Norgestim-Eth Estrad Triphasic 0.18/0.215/0.25 MG-35 MCG TABS Take 1 tablet by mouth daily  Senia Talavera MD   DULoxetine (CYMBALTA) 60 MG extended release capsule TAKE 1 CAPSULE DAILY  Senia Talavera MD   Flaxseed, Linseed, (FLAX SEEDS PO) Take by mouth  ProviderLuis Antonio MD   tiZANidine (ZANAFLEX) 2 MG tablet Take 1 tablet by mouth every evening  Therese Ortez, APRN - CNP   loratadine (CLARITIN REDITABS) 10 MG dissolvable tablet Take 1 tablet by mouth daily  ProviderLuis Antonio MD        Allergies   Allergen Reactions    Gluten Meal     Lactose Intolerance (Gi)     Valium Other (See Comments)     Hallucinations         Past Medical History:   Diagnosis Date    Allergic rhinitis     Dermatitis     Hypoglycemia        Past Surgical History:   Procedure Laterality Date    PAIN MANAGEMENT PROCEDURE  2021    UPPER GASTROINTESTINAL ENDOSCOPY  2022    EGD BIOPSY     UPPER GASTROINTESTINAL ENDOSCOPY N/A 2022    EGD BIOPSY performed by Ami Currie MD at [facility] OR    WISDOM TOOTH EXTRACTION         Social History     Socioeconomic History    Marital status:      Spouse name: Not on file

## 2024-06-08 ASSESSMENT — ENCOUNTER SYMPTOMS: BACK PAIN: 1

## 2024-06-12 ENCOUNTER — PATIENT MESSAGE (OUTPATIENT)
Dept: FAMILY MEDICINE CLINIC | Age: 34
End: 2024-06-12

## 2024-06-12 ENCOUNTER — E-VISIT (OUTPATIENT)
Dept: FAMILY MEDICINE CLINIC | Age: 34
End: 2024-06-12
Payer: COMMERCIAL

## 2024-06-12 ENCOUNTER — HOSPITAL ENCOUNTER (EMERGENCY)
Age: 34
Discharge: HOME | End: 2024-06-12
Attending: EMERGENCY MEDICINE
Payer: COMMERCIAL

## 2024-06-12 VITALS
WEIGHT: 242.51 LBS | RESPIRATION RATE: 20 BRPM | TEMPERATURE: 98.5 F | DIASTOLIC BLOOD PRESSURE: 92 MMHG | HEART RATE: 97 BPM | BODY MASS INDEX: 40.4 KG/M2 | HEIGHT: 65 IN | OXYGEN SATURATION: 98 % | SYSTOLIC BLOOD PRESSURE: 147 MMHG

## 2024-06-12 DIAGNOSIS — T78.40XA ALLERGIC REACTION, INITIAL ENCOUNTER: Primary | ICD-10-CM

## 2024-06-12 DIAGNOSIS — F90.2 ATTENTION DEFICIT HYPERACTIVITY DISORDER (ADHD), COMBINED TYPE: ICD-10-CM

## 2024-06-12 DIAGNOSIS — L30.9 DERMATITIS: Primary | ICD-10-CM

## 2024-06-12 PROCEDURE — 96375 TX/PRO/DX INJ NEW DRUG ADDON: CPT

## 2024-06-12 PROCEDURE — 99421 OL DIG E/M SVC 5-10 MIN: CPT | Performed by: PEDIATRICS

## 2024-06-12 PROCEDURE — 2580000003 HC RX 258: Performed by: NURSE PRACTITIONER

## 2024-06-12 PROCEDURE — 96374 THER/PROPH/DIAG INJ IV PUSH: CPT

## 2024-06-12 PROCEDURE — 6360000002 HC RX W HCPCS: Performed by: NURSE PRACTITIONER

## 2024-06-12 PROCEDURE — 99284 EMERGENCY DEPT VISIT MOD MDM: CPT

## 2024-06-12 PROCEDURE — 2500000003 HC RX 250 WO HCPCS: Performed by: NURSE PRACTITIONER

## 2024-06-12 RX ORDER — 0.9 % SODIUM CHLORIDE 0.9 %
1000 INTRAVENOUS SOLUTION INTRAVENOUS ONCE
Status: COMPLETED | OUTPATIENT
Start: 2024-06-12 | End: 2024-06-12

## 2024-06-12 RX ORDER — DIPHENHYDRAMINE HCL 25 MG
25 CAPSULE ORAL EVERY 6 HOURS PRN
Qty: 30 CAPSULE | Refills: 0 | Status: SHIPPED | OUTPATIENT
Start: 2024-06-12 | End: 2024-06-22

## 2024-06-12 RX ORDER — DIPHENHYDRAMINE HYDROCHLORIDE 50 MG/ML
25 INJECTION INTRAMUSCULAR; INTRAVENOUS ONCE
Status: COMPLETED | OUTPATIENT
Start: 2024-06-12 | End: 2024-06-12

## 2024-06-12 RX ORDER — FAMOTIDINE 20 MG/1
20 TABLET, FILM COATED ORAL 2 TIMES DAILY
Qty: 10 TABLET | Refills: 0 | Status: SHIPPED | OUTPATIENT
Start: 2024-06-12 | End: 2024-06-17

## 2024-06-12 RX ORDER — PREDNISONE 20 MG/1
60 TABLET ORAL DAILY
Qty: 15 TABLET | Refills: 0 | Status: SHIPPED | OUTPATIENT
Start: 2024-06-12 | End: 2024-06-17

## 2024-06-12 RX ADMIN — SODIUM CHLORIDE 1000 ML: 9 INJECTION, SOLUTION INTRAVENOUS at 18:21

## 2024-06-12 RX ADMIN — METHYLPREDNISOLONE SODIUM SUCCINATE 125 MG: 125 INJECTION, POWDER, FOR SOLUTION INTRAMUSCULAR; INTRAVENOUS at 18:20

## 2024-06-12 RX ADMIN — DIPHENHYDRAMINE HYDROCHLORIDE 25 MG: 50 INJECTION INTRAMUSCULAR; INTRAVENOUS at 18:21

## 2024-06-12 RX ADMIN — FAMOTIDINE 20 MG: 10 INJECTION, SOLUTION INTRAVENOUS at 18:21

## 2024-06-12 ASSESSMENT — PAIN - FUNCTIONAL ASSESSMENT: PAIN_FUNCTIONAL_ASSESSMENT: NONE - DENIES PAIN

## 2024-06-12 NOTE — ED PROVIDER NOTES
had already taken 25 prior to arrival, she is given Solu-Medrol and Pepcid.  She improves and there is no need for epinephrine administration.  Unsure what the causative agent would be.  She had taken Strattera which was a new drug, but also has some poison ivy.  The poison ivy is very mild and I do doubt that there was an anaphylactic reaction to this.  She has no disseminated poison ivy or reaction of the skin.  Rash to face is very minimal and almost not noticeable.  No significant hives.  We discussed what to watch out for and when to return she will be given steroids for home instructed to take Benadryl and Pepcid      REASSESSMENT          CRITICAL CARE TIME       CONSULTS:  None    PROCEDURES:  Unless otherwise noted below, none     Procedures        FINAL IMPRESSION      1. Allergic reaction, initial encounter          DISPOSITION/PLAN   DISPOSITION Decision To Discharge 06/12/2024 07:28:57 PM      PATIENT REFERRED TO:  Senia Talavera MD  [address] [place] [id number]  [phone]    In 2 days      [facility] Emergency Department  [address]  [place] [id number]  [phone]    If symptoms worsen      DISCHARGE MEDICATIONS:  Discharge Medication List as of 6/12/2024  7:33 PM        START taking these medications    Details   predniSONE (DELTASONE) 20 MG tablet Take 3 tablets by mouth daily for 5 days, Disp-15 tablet, R-0Normal      famotidine (PEPCID) 20 MG tablet Take 1 tablet by mouth 2 times daily for 5 days, Disp-10 tablet, R-0Normal      diphenhydrAMINE (BENADRYL ALLERGY) 25 MG capsule Take 1 capsule by mouth every 6 hours as needed for Itching, Disp-30 capsule, R-0Normal           Controlled Substances Monitoring:     RX Monitoring Periodic Controlled Substance Monitoring   5/14/2024  11:17 PM No signs of potential drug abuse or diversion identified.       (Please note that portions of this note were completed with a voice recognition  program.  Efforts were made to edit the dictations but occasionally words are mis-transcribed.)    BARRON Loomis CNP (electronically signed)  Attending Emergency Physician           Arlette Dooley APRN - CNP  06/14/24 0842

## 2024-06-12 NOTE — TELEPHONE ENCOUNTER
From: Noa Boucher  To: Dr. Senia Talavera  Sent: 6/12/2024 11:14 AM EDT  Subject: Strattera reaction    I started taking the Strattera about a week ago on either Sunday June 2 or Monday Federica 3. A couple days ago I started having itchy skin on my arms, face & scalp and itchy eyes. As of this morning, I am developing a rash on my face that looks like it might be hives. I'm not sure what else it would be other than the new medication. Can I stop taking it to see if the rash goes away? Should I go to a doctor/urgent care?

## 2024-06-12 NOTE — DISCHARGE INSTRUCTIONS
Stop Strattera.  Follow-up with primary care physician in the next 1 to 2 days.    Take prednisone as directed until gone    Pepcid, 20 mg twice daily for the next 5 days you may use over-the-counter or the prescription that was sent in    Benadryl 25 mg every 8 hours for itching.  You may use over-the-counter or the prescription that was sent in    Return immediately to the emergency department for any chest pain shortness of breath nausea vomiting worsening symptoms in any way, or any other concerns

## 2024-06-13 ENCOUNTER — TELEPHONE (OUTPATIENT)
Dept: FAMILY MEDICINE CLINIC | Age: 34
End: 2024-06-13

## 2024-06-13 NOTE — TELEPHONE ENCOUNTER
[facility] ED Follow up Call    Reason for ED visit:  Allergic Reaction         Paco Latham , this is Vilma HUSSEIN MA from Senia Arredondo's office, just calling to see how you are doing after your recent ED visit.    Did you receive discharge instructions?  Yes  Do you understand the discharge instructions? Yes  Did the ED give you any new prescriptions? Yes  Were you able to fill your prescriptions? Yes      Do you have one of our red, yellow and green  Zone sheets that help you to determine when you should go to the ED?Yes      Do you need or want to make a follow up appt with your PCP?No, Patient did stop taking the straterra and restarted her vyvanse.      Do you have any further needs in the home i.e. Equipment?  No        FU appts/Provider:    Future Appointments   Date Time Provider Department Center   8/22/2024  3:30 PM Senia Talavera MD [id number]

## 2024-06-13 NOTE — ED PROVIDER NOTES
eMERGENCY dEPARTMENT eNCOUnter   Independent Attestation     Pt Name: Noa Boucher  MRN: [id number]  Birthdate 1990  Date of evaluation: 6/12/24     Noa Boucher is a 33 y.o. female with CC: Facial Swelling (Rash on face and itching.  Pt suspects poison ivy exposure while doing yard work few days ago, or allergy to new medication, Strattera.   Itching to face and arms. Benadryl 25mg taken at 1300 today. )      Based on the medical record the care appears appropriate.  I was personally available for consultation in the Emergency Department.    Leland Zafar MD  Attending Emergency Physician                Leland Zafar MD  06/12/24 [id number]

## 2024-06-16 DIAGNOSIS — F90.2 ATTENTION DEFICIT HYPERACTIVITY DISORDER (ADHD), COMBINED TYPE: ICD-10-CM

## 2024-06-17 NOTE — TELEPHONE ENCOUNTER
LOV 5/29/24   RTO 8/22/24  LRF 5/14/24          Controlled Substance Monitoring:    Acute and Chronic Pain Monitoring:   RX Monitoring Periodic Controlled Substance Monitoring   5/14/2024  11:17 PM No signs of potential drug abuse or diversion identified.

## 2024-06-19 RX ORDER — LISDEXAMFETAMINE DIMESYLATE CAPSULES 10 MG/1
20 CAPSULE ORAL DAILY
Qty: 60 CAPSULE | Refills: 0 | Status: SHIPPED | OUTPATIENT
Start: 2024-06-19 | End: 2024-07-19

## 2024-07-15 DIAGNOSIS — F90.2 ATTENTION DEFICIT HYPERACTIVITY DISORDER (ADHD), COMBINED TYPE: ICD-10-CM

## 2024-07-15 NOTE — TELEPHONE ENCOUNTER
LOV 5-29-24   RTO 8-22-24  LRF 6-19-24          Controlled Substance Monitoring:    Acute and Chronic Pain Monitoring:   RX Monitoring Periodic Controlled Substance Monitoring   5/14/2024  11:17 PM No signs of potential drug abuse or diversion identified.

## 2024-07-17 RX ORDER — LISDEXAMFETAMINE DIMESYLATE CAPSULES 10 MG/1
20 CAPSULE ORAL DAILY
Qty: 60 CAPSULE | Refills: 0 | Status: SHIPPED | OUTPATIENT
Start: 2024-07-17 | End: 2025-07-17

## 2024-07-23 ENCOUNTER — PATIENT MESSAGE (OUTPATIENT)
Dept: FAMILY MEDICINE CLINIC | Age: 34
End: 2024-07-23

## 2024-07-23 NOTE — TELEPHONE ENCOUNTER
From: Noa Boucher  To: Dr. Senia Talavera  Sent: 7/23/2024 2:31 PM EDT  Subject: Need new ADHD medication     I have run out of the Vyvanse and had my Rx sent to [facility] since [facility] has closed. But [facility] does not carry the generic Vyvanse and I cannot afford $100 a month. Is there a different ADHD medication that I can take? That is not Vyvanse, Adderall, or Strattera?     Also, last time I ended up going \"cold turkey\" from not having the Vyvanse I experienced dizzy spells. Is there some way to combat that since it will most likely happen again? Thanks

## 2024-07-23 NOTE — TELEPHONE ENCOUNTER
Please call her to see if she can have this filled at a different pharmacy that carries the generic vyvanse?  I wonder why [facility] doesn't carry the generic - that seems odd to me.    The patient should be able to get generic vyvanse that is covered under her insurance somewhere.

## 2024-07-24 DIAGNOSIS — F90.2 ATTENTION DEFICIT HYPERACTIVITY DISORDER (ADHD), COMBINED TYPE: ICD-10-CM

## 2024-07-24 RX ORDER — LISDEXAMFETAMINE DIMESYLATE CAPSULES 10 MG/1
20 CAPSULE ORAL DAILY
Qty: 60 CAPSULE | Refills: 0 | Status: SHIPPED | OUTPATIENT
Start: 2024-07-24 | End: 2025-07-24

## 2024-07-24 NOTE — TELEPHONE ENCOUNTER
Pt states that [facility] is no longer going to carry this medication at all, [facility] doesn't take her insurance and she has already called around [place] the last time she needed a refill. Pt states that she will call again but she would like to switch due to the inconvenience this is causing.

## 2024-07-24 NOTE — TELEPHONE ENCOUNTER
LOV 5/29/24   RTO 8/22/24  LRF 6/19/24          Controlled Substance Monitoring:    Acute and Chronic Pain Monitoring:   RX Monitoring Periodic Controlled Substance Monitoring   7/17/2024   6:20 PM No signs of potential drug abuse or diversion identified.

## 2024-07-29 DIAGNOSIS — M51.36 DDD (DEGENERATIVE DISC DISEASE), LUMBAR: ICD-10-CM

## 2024-07-30 RX ORDER — TIZANIDINE 2 MG/1
2 TABLET ORAL EVERY EVENING
Qty: 30 TABLET | Refills: 0 | Status: SHIPPED | OUTPATIENT
Start: 2024-07-30

## 2024-07-30 NOTE — TELEPHONE ENCOUNTER
LOV 6/12/24   RTO 8/22/24  LRF 12/26/23          Controlled Substance Monitoring:    Acute and Chronic Pain Monitoring:   RX Monitoring Periodic Controlled Substance Monitoring   7/17/2024   6:20 PM No signs of potential drug abuse or diversion identified.

## 2024-08-14 DIAGNOSIS — F90.2 ATTENTION DEFICIT HYPERACTIVITY DISORDER (ADHD), COMBINED TYPE: ICD-10-CM

## 2024-08-14 NOTE — TELEPHONE ENCOUNTER
Noa Boucher is calling to request a refill on the following medication(s):    Medication Request:  Requested Prescriptions     Pending Prescriptions Disp Refills    lisdexamfetamine (VYVANSE) 10 MG capsule 60 capsule 0     Sig: Take 2 capsules by mouth daily. Max Daily Amount: 20 mg       Last Visit Date (If Applicable):  6/12/2024    Next Visit Date:    8/22/2024

## 2024-08-15 RX ORDER — LISDEXAMFETAMINE DIMESYLATE 10 MG/1
20 CAPSULE ORAL DAILY
Qty: 60 CAPSULE | Refills: 0 | Status: SHIPPED | OUTPATIENT
Start: 2024-08-15 | End: 2025-08-15

## 2024-08-19 SDOH — ECONOMIC STABILITY: FOOD INSECURITY: WITHIN THE PAST 12 MONTHS, YOU WORRIED THAT YOUR FOOD WOULD RUN OUT BEFORE YOU GOT MONEY TO BUY MORE.: NEVER TRUE

## 2024-08-19 SDOH — ECONOMIC STABILITY: INCOME INSECURITY: HOW HARD IS IT FOR YOU TO PAY FOR THE VERY BASICS LIKE FOOD, HOUSING, MEDICAL CARE, AND HEATING?: NOT HARD AT ALL

## 2024-08-19 SDOH — ECONOMIC STABILITY: FOOD INSECURITY: WITHIN THE PAST 12 MONTHS, THE FOOD YOU BOUGHT JUST DIDN'T LAST AND YOU DIDN'T HAVE MONEY TO GET MORE.: NEVER TRUE

## 2024-08-22 ENCOUNTER — OFFICE VISIT (OUTPATIENT)
Dept: FAMILY MEDICINE CLINIC | Age: 34
End: 2024-08-22
Payer: COMMERCIAL

## 2024-08-22 VITALS
HEIGHT: 64 IN | SYSTOLIC BLOOD PRESSURE: 128 MMHG | HEART RATE: 120 BPM | BODY MASS INDEX: 41.32 KG/M2 | WEIGHT: 242 LBS | TEMPERATURE: 98 F | DIASTOLIC BLOOD PRESSURE: 82 MMHG | OXYGEN SATURATION: 98 %

## 2024-08-22 DIAGNOSIS — F41.1 GENERALIZED ANXIETY DISORDER: ICD-10-CM

## 2024-08-22 DIAGNOSIS — F32.0 CURRENT MILD EPISODE OF MAJOR DEPRESSIVE DISORDER WITHOUT PRIOR EPISODE (HCC): ICD-10-CM

## 2024-08-22 DIAGNOSIS — M79.7 FIBROMYALGIA: ICD-10-CM

## 2024-08-22 DIAGNOSIS — Z51.81 THERAPEUTIC DRUG MONITORING: ICD-10-CM

## 2024-08-22 DIAGNOSIS — F90.2 ATTENTION DEFICIT HYPERACTIVITY DISORDER (ADHD), COMBINED TYPE: Primary | ICD-10-CM

## 2024-08-22 LAB
ALCOHOL URINE: ABNORMAL
AMPHETAMINE SCREEN URINE: POSITIVE
BARBITURATE SCREEN URINE: ABNORMAL
BENZODIAZEPINE SCREEN, URINE: ABNORMAL
BUPRENORPHINE URINE: ABNORMAL
COCAINE METABOLITE SCREEN URINE: ABNORMAL
FENTANYL SCREEN, URINE: ABNORMAL
GABAPENTIN SCREEN, URINE: ABNORMAL
MDMA, URINE: ABNORMAL
METHADONE SCREEN, URINE: ABNORMAL
METHAMPHETAMINE, URINE: ABNORMAL
OPIATE SCREEN URINE: ABNORMAL
OXYCODONE SCREEN URINE: ABNORMAL
PHENCYCLIDINE SCREEN URINE: ABNORMAL
PROPOXYPHENE SCREEN, URINE: ABNORMAL
SYNTHETIC CANNABINOIDS(K2) SCREEN, URINE: ABNORMAL
THC SCREEN, URINE: ABNORMAL
TRAMADOL SCREEN URINE: ABNORMAL
TRICYCLIC ANTIDEPRESSANTS, UR: ABNORMAL

## 2024-08-22 PROCEDURE — 80305 DRUG TEST PRSMV DIR OPT OBS: CPT | Performed by: PEDIATRICS

## 2024-08-22 PROCEDURE — 99214 OFFICE O/P EST MOD 30 MIN: CPT | Performed by: PEDIATRICS

## 2024-08-22 SDOH — ECONOMIC STABILITY: INCOME INSECURITY: HOW HARD IS IT FOR YOU TO PAY FOR THE VERY BASICS LIKE FOOD, HOUSING, MEDICAL CARE, AND HEATING?: NOT HARD AT ALL

## 2024-08-22 SDOH — ECONOMIC STABILITY: FOOD INSECURITY: WITHIN THE PAST 12 MONTHS, THE FOOD YOU BOUGHT JUST DIDN'T LAST AND YOU DIDN'T HAVE MONEY TO GET MORE.: NEVER TRUE

## 2024-08-22 SDOH — ECONOMIC STABILITY: FOOD INSECURITY: WITHIN THE PAST 12 MONTHS, YOU WORRIED THAT YOUR FOOD WOULD RUN OUT BEFORE YOU GOT MONEY TO BUY MORE.: NEVER TRUE

## 2024-08-22 NOTE — ASSESSMENT & PLAN NOTE
Unclear control, continue current medications and obtain genesight testing    Orders:    Genesight Psychotropic (combinatorial pharmacogenomics test); Future

## 2024-08-22 NOTE — PROGRESS NOTES
Noa Boucher (:  1990) is a 33 y.o. female,Established patient, here for evaluation of the following chief complaint(s):    ADHD         Assessment & Plan  Attention deficit hyperactivity disorder (ADHD), combined type   Uncontrolled, continue current medications and obtain genesight testing         Therapeutic drug monitoring       Orders:    POCT Rapid Drug Screen    Fibromyalgia   Unclear control, continue current treatment plan         Current mild episode of major depressive disorder without prior episode (HCC)   Unclear control, continue current medications and obtain genesight testing    Orders:    Genesight Psychotropic (combinatorial pharmacogenomics test); Future    Generalized anxiety disorder   Unclear control, continue current medications and obtain genesight testing    Orders:    Genesight Psychotropic (combinatorial pharmacogenomics test); Future        Continue current medications at this time  Obtain POCT UDS  CSM signed  Treatment should change depending on results of GeneSight  Discontinue oral contraceptive   Healthy diet and regular exercise encouraged  Good sleep hygiene recommended  Call with concerns        Return in about 3 months (around 2024) for routine follow up.       Subjective     HPI    Patient presents today for routine follow-up of ADHD.  She reports that she has been feeling as though her ADHD is not well controlled.  She reports feeling antsy, having difficulty concentrating and focusing on her work.  She was also having some trouble sleeping.  She is quite fatigued and has been having mood swings.  She does overheat easily and wonders if she is perimenopausal.  She does continue on her oral contraceptive and reports that her cycles are little lighter.  She would like to discontinue her oral contraceptive.  She does have fibromyalgia, major depression, generalized anxiety disorder that is treated with Cymbalta.  She has had some worsening of her symptoms  and reports that her fibromyalgia has flared up several times over the summer.  I suspect this could also be because of her ADHD not being very well-controlled and her depression and anxiety somewhat flared.  We discussed GeneSight testing and she would like to pursue this today.  This will hopefully help us to tailor her treatment to improve her symptoms.  cmw        Review of Systems   Constitutional:  Positive for fatigue. Negative for appetite change, fever and unexpected weight change.   Eyes:  Negative for photophobia, pain and redness.   Respiratory:  Negative for cough, shortness of breath, wheezing and stridor.    Cardiovascular:  Negative for chest pain, palpitations and leg swelling.   Endocrine: Negative for polydipsia, polyphagia and polyuria.        Hot flashes   Genitourinary:  Negative for dysuria and frequency.   Musculoskeletal:  Positive for back pain (chronic) and myalgias (fibromyalgia flared up x 2 this summer - tretaed with cymbalta).   Skin:  Negative for color change and rash.   Allergic/Immunologic: Negative for immunocompromised state.   Neurological:  Negative for dizziness, light-headedness and headaches.   Hematological:  Negative for adenopathy. Does not bruise/bleed easily.   Psychiatric/Behavioral:  Positive for decreased concentration and dysphoric mood. Negative for self-injury and sleep disturbance. The patient is nervous/anxious and is hyperactive (still with some impulsive behaviors / self stimming behaviors).         Lots of mood swings          Objective     Physical Exam  Vitals and nursing note reviewed.   Constitutional:       General: She is not in acute distress.     Appearance: Normal appearance. She is well-developed. She is obese. She is not ill-appearing, toxic-appearing or diaphoretic.   HENT:      Head: Normocephalic.      Right Ear: Tympanic membrane, ear canal and external ear normal.      Left Ear: Tympanic membrane, ear canal and external ear normal.      Nose:

## 2024-09-16 ENCOUNTER — PATIENT MESSAGE (OUTPATIENT)
Dept: FAMILY MEDICINE CLINIC | Age: 34
End: 2024-09-16

## 2024-09-16 DIAGNOSIS — F90.2 ATTENTION DEFICIT HYPERACTIVITY DISORDER (ADHD), COMBINED TYPE: ICD-10-CM

## 2024-09-16 DIAGNOSIS — F90.2 ATTENTION DEFICIT HYPERACTIVITY DISORDER (ADHD), COMBINED TYPE: Primary | ICD-10-CM

## 2024-09-16 DIAGNOSIS — M51.36 DDD (DEGENERATIVE DISC DISEASE), LUMBAR: ICD-10-CM

## 2024-09-16 RX ORDER — LISDEXAMFETAMINE DIMESYLATE 10 MG/1
20 CAPSULE ORAL DAILY
Qty: 60 CAPSULE | Refills: 0 | Status: CANCELLED | OUTPATIENT
Start: 2024-09-16 | End: 2025-09-16

## 2024-09-18 RX ORDER — TIZANIDINE 2 MG/1
2 TABLET ORAL EVERY EVENING
Qty: 30 TABLET | Refills: 2 | Status: SHIPPED | OUTPATIENT
Start: 2024-09-18

## 2024-09-20 DIAGNOSIS — F90.2 ATTENTION DEFICIT HYPERACTIVITY DISORDER (ADHD), COMBINED TYPE: ICD-10-CM

## 2024-09-20 RX ORDER — LISDEXAMFETAMINE DIMESYLATE 10 MG/1
20 CAPSULE ORAL DAILY
Qty: 60 CAPSULE | Refills: 0 | Status: CANCELLED | OUTPATIENT
Start: 2024-09-20 | End: 2025-09-20

## 2024-09-23 DIAGNOSIS — F90.2 ATTENTION DEFICIT HYPERACTIVITY DISORDER (ADHD), COMBINED TYPE: Primary | ICD-10-CM

## 2024-09-23 RX ORDER — DEXMETHYLPHENIDATE HYDROCHLORIDE 10 MG/1
10 CAPSULE, EXTENDED RELEASE ORAL DAILY
Qty: 30 CAPSULE | Refills: 0 | Status: CANCELLED | OUTPATIENT
Start: 2024-09-23 | End: 2024-10-23

## 2024-09-23 RX ORDER — DEXMETHYLPHENIDATE HYDROCHLORIDE 15 MG/1
15 CAPSULE, EXTENDED RELEASE ORAL DAILY
Qty: 30 CAPSULE | Refills: 0 | Status: SHIPPED | OUTPATIENT
Start: 2024-09-23 | End: 2024-10-23

## 2024-09-27 DIAGNOSIS — F41.1 GENERALIZED ANXIETY DISORDER: ICD-10-CM

## 2024-09-27 DIAGNOSIS — F32.0 CURRENT MILD EPISODE OF MAJOR DEPRESSIVE DISORDER WITHOUT PRIOR EPISODE (HCC): ICD-10-CM

## 2024-09-28 RX ORDER — DEXMETHYLPHENIDATE HYDROCHLORIDE 5 MG/1
5 CAPSULE, EXTENDED RELEASE ORAL DAILY
Qty: 30 CAPSULE | Refills: 0 | Status: SHIPPED | OUTPATIENT
Start: 2024-09-28 | End: 2024-10-28

## 2024-09-28 RX ORDER — DEXMETHYLPHENIDATE HYDROCHLORIDE 10 MG/1
10 CAPSULE, EXTENDED RELEASE ORAL DAILY
Qty: 30 CAPSULE | Refills: 0 | Status: SHIPPED | OUTPATIENT
Start: 2024-09-28 | End: 2024-10-28

## 2024-10-21 DIAGNOSIS — M51.369 DDD (DEGENERATIVE DISC DISEASE), LUMBAR: ICD-10-CM

## 2024-10-22 RX ORDER — TIZANIDINE 2 MG/1
2 TABLET ORAL EVERY EVENING
Qty: 30 TABLET | Refills: 2 | Status: SHIPPED | OUTPATIENT
Start: 2024-10-22

## 2024-10-22 NOTE — TELEPHONE ENCOUNTER
LOV 8/22/24   RTO 11/22/24  LRF 9/18/24          Controlled Substance Monitoring:    Acute and Chronic Pain Monitoring:   RX Monitoring Periodic Controlled Substance Monitoring   8/15/2024   5:24 PM No signs of potential drug abuse or diversion identified.             
No.../until seen by surgeon.

## 2024-10-30 DIAGNOSIS — F90.2 ATTENTION DEFICIT HYPERACTIVITY DISORDER (ADHD), COMBINED TYPE: ICD-10-CM

## 2024-10-31 RX ORDER — DEXMETHYLPHENIDATE HYDROCHLORIDE 10 MG/1
10 CAPSULE, EXTENDED RELEASE ORAL DAILY
Qty: 30 CAPSULE | Refills: 0 | Status: SHIPPED | OUTPATIENT
Start: 2024-10-31 | End: 2024-11-30

## 2024-10-31 RX ORDER — DEXMETHYLPHENIDATE HYDROCHLORIDE 5 MG/1
5 CAPSULE, EXTENDED RELEASE ORAL DAILY
Qty: 30 CAPSULE | Refills: 0 | Status: SHIPPED | OUTPATIENT
Start: 2024-10-31 | End: 2024-11-30

## 2024-10-31 NOTE — TELEPHONE ENCOUNTER
LOV 8/2/24   RTO 11/22/24  LRF 9/28/24          Controlled Substance Monitoring:    Acute and Chronic Pain Monitoring:   RX Monitoring Periodic Controlled Substance Monitoring   8/15/2024   5:24 PM No signs of potential drug abuse or diversion identified.

## 2024-11-04 ENCOUNTER — OFFICE VISIT (OUTPATIENT)
Dept: PAIN MANAGEMENT | Age: 34
End: 2024-11-04
Payer: COMMERCIAL

## 2024-11-04 VITALS — HEIGHT: 64 IN | WEIGHT: 242 LBS | BODY MASS INDEX: 41.32 KG/M2

## 2024-11-04 DIAGNOSIS — M79.7 FIBROMYALGIA: Primary | Chronic | ICD-10-CM

## 2024-11-04 DIAGNOSIS — G89.29 OTHER CHRONIC PAIN: ICD-10-CM

## 2024-11-04 PROCEDURE — 99204 OFFICE O/P NEW MOD 45 MIN: CPT | Performed by: PAIN MEDICINE

## 2024-11-04 RX ORDER — PREGABALIN 25 MG/1
25 CAPSULE ORAL 2 TIMES DAILY
Qty: 60 CAPSULE | Refills: 1 | Status: SHIPPED | OUTPATIENT
Start: 2024-11-04 | End: 2024-12-04

## 2024-11-04 ASSESSMENT — ENCOUNTER SYMPTOMS: NAUSEA: 0

## 2024-11-04 NOTE — PROGRESS NOTES
capsule by mouth 2 times daily for 30 days. Max Daily Amount: 50 mg, Disp: 60 capsule, Rfl: 1    Dexmethylphenidate HCl ER (FOCALIN XR) 5 MG CP24 ER CAPSULE, Take 1 capsule by mouth daily for 30 days. Max Daily Amount: 5 mg, Disp: 30 capsule, Rfl: 0    Dexmethylphenidate HCl ER (FOCALIN XR) 10 MG CP24, Take 1 capsule by mouth daily for 30 days. Max Daily Amount: 10 mg, Disp: 30 capsule, Rfl: 0    tiZANidine (ZANAFLEX) 2 MG tablet, Take 1 tablet by mouth every evening, Disp: 30 tablet, Rfl: 2    DULoxetine (CYMBALTA) 60 MG extended release capsule, TAKE 1 CAPSULE DAILY, Disp: 90 capsule, Rfl: 3    Flaxseed, Linseed, (FLAX SEEDS PO), Take by mouth, Disp: , Rfl:     loratadine (CLARITIN REDITABS) 10 MG dissolvable tablet, Take 1 tablet by mouth daily, Disp: , Rfl:     Dexmethylphenidate HCl ER (FOCALIN XR) 15 MG CP24, Take 15 mg by mouth daily for 30 days. Max Daily Amount: 15 mg, Disp: 30 capsule, Rfl: 0    Norgestim-Eth Estrad Triphasic 0.18/0.215/0.25 MG-35 MCG TABS, Take 1 tablet by mouth daily, Disp: 84 tablet, Rfl: 3    Family History   Problem Relation Age of Onset    Breast Cancer Paternal Grandmother         over 50       Social History     Socioeconomic History    Marital status:      Spouse name: Not on file    Number of children: Not on file    Years of education: Not on file    Highest education level: Not on file   Occupational History    Not on file   Tobacco Use    Smoking status: Never    Smokeless tobacco: Never   Vaping Use    Vaping status: Never Used   Substance and Sexual Activity    Alcohol use: Yes     Comment: once a week    Drug use: Never    Sexual activity: Yes     Partners: Male   Other Topics Concern    Not on file   Social History Narrative    Not on file     Social Determinants of Health     Financial Resource Strain: Low Risk  (8/22/2024)    Overall Financial Resource Strain (CARDIA)     Difficulty of Paying Living Expenses: Not hard at all   Food Insecurity: No Food Insecurity

## 2024-11-22 ENCOUNTER — OFFICE VISIT (OUTPATIENT)
Dept: FAMILY MEDICINE CLINIC | Age: 34
End: 2024-11-22

## 2024-11-22 VITALS
OXYGEN SATURATION: 100 % | SYSTOLIC BLOOD PRESSURE: 130 MMHG | TEMPERATURE: 97.9 F | HEART RATE: 89 BPM | DIASTOLIC BLOOD PRESSURE: 76 MMHG | BODY MASS INDEX: 42.68 KG/M2 | HEIGHT: 64 IN | WEIGHT: 250 LBS

## 2024-11-22 DIAGNOSIS — R23.2 HOT FLASHES: ICD-10-CM

## 2024-11-22 DIAGNOSIS — M79.7 FIBROMYALGIA: ICD-10-CM

## 2024-11-22 DIAGNOSIS — F90.2 ATTENTION DEFICIT HYPERACTIVITY DISORDER (ADHD), COMBINED TYPE: Primary | ICD-10-CM

## 2024-11-22 DIAGNOSIS — E66.01 CLASS 3 SEVERE OBESITY DUE TO EXCESS CALORIES WITHOUT SERIOUS COMORBIDITY WITH BODY MASS INDEX (BMI) OF 40.0 TO 44.9 IN ADULT: ICD-10-CM

## 2024-11-22 DIAGNOSIS — F41.1 GENERALIZED ANXIETY DISORDER: ICD-10-CM

## 2024-11-22 DIAGNOSIS — E66.813 CLASS 3 SEVERE OBESITY DUE TO EXCESS CALORIES WITHOUT SERIOUS COMORBIDITY WITH BODY MASS INDEX (BMI) OF 40.0 TO 44.9 IN ADULT: ICD-10-CM

## 2024-11-22 DIAGNOSIS — F32.0 CURRENT MILD EPISODE OF MAJOR DEPRESSIVE DISORDER WITHOUT PRIOR EPISODE (HCC): ICD-10-CM

## 2024-11-22 ASSESSMENT — ENCOUNTER SYMPTOMS
WHEEZING: 0
STRIDOR: 0
EYE PAIN: 0
COLOR CHANGE: 0
SHORTNESS OF BREATH: 0
BACK PAIN: 1
EYE REDNESS: 0
PHOTOPHOBIA: 0
COUGH: 0

## 2024-11-22 NOTE — ASSESSMENT & PLAN NOTE
Orders:    Dexmethylphenidate HCl ER (FOCALIN XR) 5 MG CP24 ER CAPSULE; Take 1 capsule by mouth daily for 30 days. Max Daily Amount: 5 mg    Dexmethylphenidate HCl ER (FOCALIN XR) 10 MG CP24; Take 1 capsule by mouth daily for 30 days. Max Daily Amount: 10 mg

## 2024-11-22 NOTE — PROGRESS NOTES
Noa Boucher (:  1990) is a 34 y.o. female,Established patient, here for evaluation of the following chief complaint(s):    ADHD         Assessment & Plan  Attention deficit hyperactivity disorder (ADHD), combined type       Orders:    Dexmethylphenidate HCl ER (FOCALIN XR) 5 MG CP24 ER CAPSULE; Take 1 capsule by mouth daily for 30 days. Max Daily Amount: 5 mg    Dexmethylphenidate HCl ER (FOCALIN XR) 10 MG CP24; Take 1 capsule by mouth daily for 30 days. Max Daily Amount: 10 mg    Hot flashes       Orders:    Follicle Stimulating Hormone; Future    Luteinizing Hormone; Future    Estrogens, Fractionated; Future    Testosterone, free, total; Future    Fibromyalgia            Current mild episode of major depressive disorder without prior episode (HCC)            Generalized anxiety disorder            Class 3 severe obesity due to excess calories without serious comorbidity with body mass index (BMI) of 40.0 to 44.9 in adult              Continue focalin XR 15mg once daily   Strict daily schedule recommended   POCT UDS UTD  CSM UTD  Obtain hormone levels as ordered  Continue Cymbalta  Counseling recommended  Healthy diet and regular exercise encouraged  Weight reduction encouraged  Good sleep hygiene recommended  Call with concerns        Return in about 3 months (around 2025) for routine follow up.       Subjective     HPI      Noa Boucher is here for evaluation for ADHD.  female is not in school     DSM-V Diagnostic Criteria for ADHD     Rating scale (Rare- 0, Occasional - 1, Frequent - 2)     Inattention:   Fails to give close attention to details or makes careless mistakes in schoolwork, work, or other activities: 0  Has difficulty sustaining attention is tasks or play activities:  0  Does not seem to listen when spoken to directly:  0  Does not follow through on instructions and fails to finish schoolwork, chores, or duties(not due to oppositional behavior or failure to

## 2024-11-23 RX ORDER — DEXMETHYLPHENIDATE HYDROCHLORIDE 5 MG/1
5 CAPSULE, EXTENDED RELEASE ORAL DAILY
Qty: 30 CAPSULE | Refills: 0 | Status: SHIPPED | OUTPATIENT
Start: 2024-11-29 | End: 2024-12-29

## 2024-11-23 RX ORDER — DEXMETHYLPHENIDATE HYDROCHLORIDE 10 MG/1
10 CAPSULE, EXTENDED RELEASE ORAL DAILY
Qty: 30 CAPSULE | Refills: 0 | Status: SHIPPED | OUTPATIENT
Start: 2024-11-29 | End: 2024-12-29

## 2024-11-29 ENCOUNTER — HOSPITAL ENCOUNTER (OUTPATIENT)
Age: 34
Discharge: HOME | End: 2024-11-29
Payer: COMMERCIAL

## 2024-11-29 DIAGNOSIS — R23.2 HOT FLASHES: ICD-10-CM

## 2024-11-29 LAB
FSH SERPL-ACNC: 1.9 MIU/ML
LH SERPL-ACNC: 3.4 MIU/ML (ref 1.7–8.6)
SHBG SERPL-SCNC: 70 NMOL/L (ref 25–122)
TESTOST FREE MFR SERPL: 2.5 PG/ML (ref 1.3–9.2)
TESTOST SERPL-MCNC: 23 NG/DL (ref 8–48)

## 2024-11-29 PROCEDURE — 83001 ASSAY OF GONADOTROPIN (FSH): CPT

## 2024-11-29 PROCEDURE — 83002 ASSAY OF GONADOTROPIN (LH): CPT

## 2024-11-29 PROCEDURE — 84270 ASSAY OF SEX HORMONE GLOBUL: CPT

## 2024-11-29 PROCEDURE — 82671 ASSAY OF ESTROGENS: CPT

## 2024-11-29 PROCEDURE — 36415 COLL VENOUS BLD VENIPUNCTURE: CPT

## 2024-11-29 PROCEDURE — 84403 ASSAY OF TOTAL TESTOSTERONE: CPT

## 2024-12-03 LAB
ESTRADIOL LEVEL: 112.1 PG/ML
ESTROGEN TOTAL: 184.4 PG/ML
ESTRONE SERPL-MCNC: 72.3 PG/ML

## 2024-12-18 ENCOUNTER — PATIENT MESSAGE (OUTPATIENT)
Dept: FAMILY MEDICINE CLINIC | Age: 34
End: 2024-12-18

## 2024-12-28 DIAGNOSIS — F90.2 ATTENTION DEFICIT HYPERACTIVITY DISORDER (ADHD), COMBINED TYPE: ICD-10-CM

## 2024-12-30 RX ORDER — DEXMETHYLPHENIDATE HYDROCHLORIDE 10 MG/1
10 CAPSULE, EXTENDED RELEASE ORAL DAILY
Qty: 30 CAPSULE | Refills: 0 | Status: SHIPPED | OUTPATIENT
Start: 2024-12-30 | End: 2025-01-29

## 2024-12-30 RX ORDER — DEXMETHYLPHENIDATE HYDROCHLORIDE 5 MG/1
5 CAPSULE, EXTENDED RELEASE ORAL DAILY
Qty: 30 CAPSULE | Refills: 0 | Status: SHIPPED | OUTPATIENT
Start: 2024-12-30 | End: 2025-01-29

## 2024-12-30 NOTE — TELEPHONE ENCOUNTER
LOV 11/22/24   RTO 3/6/25  LRF 11/29/24          Controlled Substance Monitoring:    Acute and Chronic Pain Monitoring:   RX Monitoring Periodic Controlled Substance Monitoring   11/22/2024   4:53 PM No signs of potential drug abuse or diversion identified.

## 2025-01-05 ENCOUNTER — PATIENT MESSAGE (OUTPATIENT)
Dept: FAMILY MEDICINE CLINIC | Age: 35
End: 2025-01-05

## 2025-02-02 DIAGNOSIS — F90.2 ATTENTION DEFICIT HYPERACTIVITY DISORDER (ADHD), COMBINED TYPE: ICD-10-CM

## 2025-02-03 NOTE — TELEPHONE ENCOUNTER
LOV 11/22/24   RTO 3/6/25  LRF 12/30/24          Controlled Substance Monitoring:    Acute and Chronic Pain Monitoring:   RX Monitoring Periodic Controlled Substance Monitoring   12/30/2024   5:58 PM No signs of potential drug abuse or diversion identified.

## 2025-02-04 RX ORDER — DEXMETHYLPHENIDATE HYDROCHLORIDE 10 MG/1
10 CAPSULE, EXTENDED RELEASE ORAL DAILY
Qty: 30 CAPSULE | Refills: 0 | Status: SHIPPED | OUTPATIENT
Start: 2025-02-04 | End: 2025-03-06

## 2025-02-04 RX ORDER — DEXMETHYLPHENIDATE HYDROCHLORIDE 5 MG/1
5 CAPSULE, EXTENDED RELEASE ORAL DAILY
Qty: 30 CAPSULE | Refills: 0 | Status: SHIPPED | OUTPATIENT
Start: 2025-02-04 | End: 2025-03-06

## 2025-02-20 DIAGNOSIS — F90.2 ATTENTION DEFICIT HYPERACTIVITY DISORDER (ADHD), COMBINED TYPE: ICD-10-CM

## 2025-02-20 RX ORDER — DEXMETHYLPHENIDATE HYDROCHLORIDE 10 MG/1
10 CAPSULE, EXTENDED RELEASE ORAL DAILY
Qty: 30 CAPSULE | Refills: 0 | Status: CANCELLED | OUTPATIENT
Start: 2025-02-20 | End: 2025-03-22

## 2025-02-20 RX ORDER — DEXMETHYLPHENIDATE HYDROCHLORIDE 5 MG/1
5 CAPSULE, EXTENDED RELEASE ORAL DAILY
Qty: 30 CAPSULE | Refills: 0 | Status: CANCELLED | OUTPATIENT
Start: 2025-02-20 | End: 2025-03-22

## 2025-03-03 DIAGNOSIS — F41.1 GENERALIZED ANXIETY DISORDER: ICD-10-CM

## 2025-03-03 DIAGNOSIS — F90.2 ATTENTION DEFICIT HYPERACTIVITY DISORDER (ADHD), COMBINED TYPE: ICD-10-CM

## 2025-03-03 SDOH — ECONOMIC STABILITY: TRANSPORTATION INSECURITY
IN THE PAST 12 MONTHS, HAS THE LACK OF TRANSPORTATION KEPT YOU FROM MEDICAL APPOINTMENTS OR FROM GETTING MEDICATIONS?: NO

## 2025-03-03 SDOH — ECONOMIC STABILITY: FOOD INSECURITY: WITHIN THE PAST 12 MONTHS, YOU WORRIED THAT YOUR FOOD WOULD RUN OUT BEFORE YOU GOT MONEY TO BUY MORE.: NEVER TRUE

## 2025-03-03 SDOH — ECONOMIC STABILITY: FOOD INSECURITY: WITHIN THE PAST 12 MONTHS, THE FOOD YOU BOUGHT JUST DIDN'T LAST AND YOU DIDN'T HAVE MONEY TO GET MORE.: NEVER TRUE

## 2025-03-03 SDOH — ECONOMIC STABILITY: INCOME INSECURITY: IN THE LAST 12 MONTHS, WAS THERE A TIME WHEN YOU WERE NOT ABLE TO PAY THE MORTGAGE OR RENT ON TIME?: NO

## 2025-03-03 ASSESSMENT — PATIENT HEALTH QUESTIONNAIRE - PHQ9
3. TROUBLE FALLING OR STAYING ASLEEP: SEVERAL DAYS
5. POOR APPETITE OR OVEREATING: NOT AT ALL
SUM OF ALL RESPONSES TO PHQ QUESTIONS 1-9: 3
7. TROUBLE CONCENTRATING ON THINGS, SUCH AS READING THE NEWSPAPER OR WATCHING TELEVISION: SEVERAL DAYS
1. LITTLE INTEREST OR PLEASURE IN DOING THINGS: NOT AT ALL
5. POOR APPETITE OR OVEREATING: NOT AT ALL
SUM OF ALL RESPONSES TO PHQ QUESTIONS 1-9: 3
6. FEELING BAD ABOUT YOURSELF - OR THAT YOU ARE A FAILURE OR HAVE LET YOURSELF OR YOUR FAMILY DOWN: NOT AT ALL
7. TROUBLE CONCENTRATING ON THINGS, SUCH AS READING THE NEWSPAPER OR WATCHING TELEVISION: SEVERAL DAYS
8. MOVING OR SPEAKING SO SLOWLY THAT OTHER PEOPLE COULD HAVE NOTICED. OR THE OPPOSITE, BEING SO FIGETY OR RESTLESS THAT YOU HAVE BEEN MOVING AROUND A LOT MORE THAN USUAL: SEVERAL DAYS
9. THOUGHTS THAT YOU WOULD BE BETTER OFF DEAD, OR OF HURTING YOURSELF: NOT AT ALL
2. FEELING DOWN, DEPRESSED OR HOPELESS: NOT AT ALL
9. THOUGHTS THAT YOU WOULD BE BETTER OFF DEAD, OR OF HURTING YOURSELF: NOT AT ALL
3. TROUBLE FALLING OR STAYING ASLEEP: SEVERAL DAYS
8. MOVING OR SPEAKING SO SLOWLY THAT OTHER PEOPLE COULD HAVE NOTICED. OR THE OPPOSITE - BEING SO FIDGETY OR RESTLESS THAT YOU HAVE BEEN MOVING AROUND A LOT MORE THAN USUAL: SEVERAL DAYS
10. IF YOU CHECKED OFF ANY PROBLEMS, HOW DIFFICULT HAVE THESE PROBLEMS MADE IT FOR YOU TO DO YOUR WORK, TAKE CARE OF THINGS AT HOME, OR GET ALONG WITH OTHER PEOPLE: NOT DIFFICULT AT ALL
6. FEELING BAD ABOUT YOURSELF - OR THAT YOU ARE A FAILURE OR HAVE LET YOURSELF OR YOUR FAMILY DOWN: NOT AT ALL
4. FEELING TIRED OR HAVING LITTLE ENERGY: NOT AT ALL
4. FEELING TIRED OR HAVING LITTLE ENERGY: NOT AT ALL
10. IF YOU CHECKED OFF ANY PROBLEMS, HOW DIFFICULT HAVE THESE PROBLEMS MADE IT FOR YOU TO DO YOUR WORK, TAKE CARE OF THINGS AT HOME, OR GET ALONG WITH OTHER PEOPLE: NOT DIFFICULT AT ALL
SUM OF ALL RESPONSES TO PHQ QUESTIONS 1-9: 3
2. FEELING DOWN, DEPRESSED OR HOPELESS: NOT AT ALL
1. LITTLE INTEREST OR PLEASURE IN DOING THINGS: NOT AT ALL

## 2025-03-03 NOTE — TELEPHONE ENCOUNTER
LOV 11/22/24   RTO 3/6/25  LRF 2/4/25          Controlled Substance Monitoring:    Acute and Chronic Pain Monitoring:   RX Monitoring Periodic Controlled Substance Monitoring   2/4/2025  11:47 PM No signs of potential drug abuse or diversion identified.

## 2025-03-04 RX ORDER — DULOXETIN HYDROCHLORIDE 60 MG/1
60 CAPSULE, DELAYED RELEASE ORAL DAILY
Qty: 90 CAPSULE | Refills: 3 | Status: SHIPPED | OUTPATIENT
Start: 2025-03-04

## 2025-03-04 RX ORDER — DEXMETHYLPHENIDATE HYDROCHLORIDE 5 MG/1
5 CAPSULE, EXTENDED RELEASE ORAL DAILY
Qty: 30 CAPSULE | Refills: 0 | Status: SHIPPED | OUTPATIENT
Start: 2025-03-04 | End: 2025-04-03

## 2025-03-04 RX ORDER — DEXMETHYLPHENIDATE HYDROCHLORIDE 10 MG/1
10 CAPSULE, EXTENDED RELEASE ORAL DAILY
Qty: 30 CAPSULE | Refills: 0 | Status: SHIPPED | OUTPATIENT
Start: 2025-03-04 | End: 2025-04-03

## 2025-03-06 ENCOUNTER — OFFICE VISIT (OUTPATIENT)
Dept: FAMILY MEDICINE CLINIC | Age: 35
End: 2025-03-06
Payer: COMMERCIAL

## 2025-03-06 VITALS
SYSTOLIC BLOOD PRESSURE: 128 MMHG | OXYGEN SATURATION: 99 % | WEIGHT: 243 LBS | DIASTOLIC BLOOD PRESSURE: 86 MMHG | TEMPERATURE: 98.7 F | HEART RATE: 98 BPM | HEIGHT: 64 IN | BODY MASS INDEX: 41.48 KG/M2

## 2025-03-06 DIAGNOSIS — M54.50 CHRONIC BILATERAL LOW BACK PAIN, UNSPECIFIED WHETHER SCIATICA PRESENT: ICD-10-CM

## 2025-03-06 DIAGNOSIS — M47.816 LUMBAR SPONDYLOSIS: ICD-10-CM

## 2025-03-06 DIAGNOSIS — E66.01 CLASS 3 SEVERE OBESITY DUE TO EXCESS CALORIES WITHOUT SERIOUS COMORBIDITY WITH BODY MASS INDEX (BMI) OF 40.0 TO 44.9 IN ADULT: ICD-10-CM

## 2025-03-06 DIAGNOSIS — Z30.41 ORAL CONTRACEPTIVE PILL SURVEILLANCE: ICD-10-CM

## 2025-03-06 DIAGNOSIS — F32.0 CURRENT MILD EPISODE OF MAJOR DEPRESSIVE DISORDER WITHOUT PRIOR EPISODE: ICD-10-CM

## 2025-03-06 DIAGNOSIS — G89.29 CHRONIC BILATERAL LOW BACK PAIN, UNSPECIFIED WHETHER SCIATICA PRESENT: ICD-10-CM

## 2025-03-06 DIAGNOSIS — E66.813 CLASS 3 SEVERE OBESITY DUE TO EXCESS CALORIES WITHOUT SERIOUS COMORBIDITY WITH BODY MASS INDEX (BMI) OF 40.0 TO 44.9 IN ADULT: ICD-10-CM

## 2025-03-06 DIAGNOSIS — M51.360 DEGENERATION OF INTERVERTEBRAL DISC OF LUMBAR REGION WITH DISCOGENIC BACK PAIN: ICD-10-CM

## 2025-03-06 DIAGNOSIS — M79.7 FIBROMYALGIA: ICD-10-CM

## 2025-03-06 DIAGNOSIS — F90.2 ATTENTION DEFICIT HYPERACTIVITY DISORDER (ADHD), COMBINED TYPE: Primary | ICD-10-CM

## 2025-03-06 DIAGNOSIS — F41.1 GENERALIZED ANXIETY DISORDER: ICD-10-CM

## 2025-03-06 PROCEDURE — 99214 OFFICE O/P EST MOD 30 MIN: CPT | Performed by: PEDIATRICS

## 2025-03-06 RX ORDER — NORGESTIMATE AND ETHINYL ESTRADIOL 7DAYSX3 28
1 KIT ORAL DAILY
Qty: 84 TABLET | Refills: 3 | Status: SHIPPED | OUTPATIENT
Start: 2025-03-06

## 2025-03-06 ASSESSMENT — ENCOUNTER SYMPTOMS
WHEEZING: 0
COLOR CHANGE: 0
COUGH: 0
PHOTOPHOBIA: 0
BACK PAIN: 1
SHORTNESS OF BREATH: 0
EYE PAIN: 0
STRIDOR: 0
EYE REDNESS: 0

## 2025-03-06 NOTE — PROGRESS NOTES
Noa Boucher (:  1990) is a 34 y.o. female,Established patient, here for evaluation of the following chief complaint(s):    Medication Check (No changes or concerns )         Assessment & Plan  Attention deficit hyperactivity disorder (ADHD), combined type            Fibromyalgia            Chronic bilateral low back pain, unspecified whether sciatica present            Degeneration of intervertebral disc of lumbar region with discogenic back pain            Lumbar spondylosis            Current mild episode of major depressive disorder without prior episode            Generalized anxiety disorder            Oral contraceptive pill surveillance            Class 3 severe obesity due to excess calories without serious comorbidity with body mass index (BMI) of 40.0 to 44.9 in adult                Increase focalin XR to 20mg once daily with next refill - due in 1 month  Strict daily schedule recommended   POCT UDS UTD  CSM UTD  Regular exercise such as water aerobics and yoga encouraged  Follow-up with pain management as scheduled  Continue Cymbalta at current dosage   Continue frequent counseling   Refill oral contraceptive to continue regular use  Healthy diet and regular exercise encouraged  Weight reduction encouraged  Good sleep hygiene recommended  Call with concerns      Return in about 3 months (around 2025) for routine follow up.       Subjective     HPI    Patient presents today for routine follow-up of ADHD, fibromyalgia, chronic low back pain, major depression, generalized anxiety disorder and obesity.  She is currently taking Focalin and states that this does seem to help her ADHD symptoms well.  She does still have some frequent self-stimulation that she struggles with but this does seem to be much better with this medication.  We discussed adjusting the dosage and increasing to 20 mg with her next refill.  She is interested in doing this to see if we can gain just a little better  Speaking Coherently

## 2025-04-02 ENCOUNTER — PATIENT MESSAGE (OUTPATIENT)
Dept: FAMILY MEDICINE CLINIC | Age: 35
End: 2025-04-02

## 2025-04-02 DIAGNOSIS — F90.2 ATTENTION DEFICIT HYPERACTIVITY DISORDER (ADHD), COMBINED TYPE: Primary | ICD-10-CM

## 2025-04-02 NOTE — TELEPHONE ENCOUNTER
LOV 3/6/25   RTO 6/6/25  LRF 3/4/25      Per patient we are switching her to Focalin XR 20mg 1 tab daily and she will not take the 2 Xr5mg and XR 10mg.          Controlled Substance Monitoring:    Acute and Chronic Pain Monitoring:   RX Monitoring Periodic Controlled Substance Monitoring   2/4/2025  11:47 PM No signs of potential drug abuse or diversion identified.

## 2025-04-04 RX ORDER — DEXMETHYLPHENIDATE HYDROCHLORIDE 20 MG/1
20 CAPSULE, EXTENDED RELEASE ORAL DAILY
Qty: 30 CAPSULE | Refills: 0 | Status: SHIPPED | OUTPATIENT
Start: 2025-04-04 | End: 2026-04-05

## 2025-04-30 DIAGNOSIS — F90.2 ATTENTION DEFICIT HYPERACTIVITY DISORDER (ADHD), COMBINED TYPE: ICD-10-CM

## 2025-05-01 NOTE — TELEPHONE ENCOUNTER
LOV 03/06/2025   RTO 06/06/2025  LRF 04/04/225          Controlled Substance Monitoring:    Acute and Chronic Pain Monitoring:   RX Monitoring Periodic Controlled Substance Monitoring   2/4/2025  11:47 PM No signs of potential drug abuse or diversion identified.        [facility] reviewed prior to refilling medications.   Medications on report are consistent with treatment plan.    UDS 8/22/2024  Med contract with Dr. MCCAULEY 8/22/2024  Earliest fill 5/5    BARRON Montano-CNP

## 2025-05-02 RX ORDER — DEXMETHYLPHENIDATE HYDROCHLORIDE 20 MG/1
20 CAPSULE, EXTENDED RELEASE ORAL DAILY
Qty: 30 CAPSULE | Refills: 0 | Status: SHIPPED | OUTPATIENT
Start: 2025-05-05 | End: 2026-05-06

## 2025-06-01 DIAGNOSIS — F90.2 ATTENTION DEFICIT HYPERACTIVITY DISORDER (ADHD), COMBINED TYPE: ICD-10-CM

## 2025-06-04 RX ORDER — DEXMETHYLPHENIDATE HYDROCHLORIDE 20 MG/1
20 CAPSULE, EXTENDED RELEASE ORAL DAILY
Qty: 30 CAPSULE | Refills: 0 | Status: SHIPPED | OUTPATIENT
Start: 2025-06-04 | End: 2026-06-05

## 2025-06-26 DIAGNOSIS — F90.2 ATTENTION DEFICIT HYPERACTIVITY DISORDER (ADHD), COMBINED TYPE: ICD-10-CM

## 2025-06-26 RX ORDER — DEXMETHYLPHENIDATE HYDROCHLORIDE 20 MG/1
20 CAPSULE, EXTENDED RELEASE ORAL DAILY
Qty: 30 CAPSULE | Refills: 0 | Status: SHIPPED | OUTPATIENT
Start: 2025-06-26 | End: 2026-06-27

## 2025-06-26 NOTE — TELEPHONE ENCOUNTER
LOV 3/6/25   RTO sent mychart scheduling ticket  LRF 6/4/25          Controlled Substance Monitoring:    Acute and Chronic Pain Monitoring:   RX Monitoring Periodic Controlled Substance Monitoring   6/4/2025   7:36 AM No signs of potential drug abuse or diversion identified.

## 2025-06-27 ENCOUNTER — TELEPHONE (OUTPATIENT)
Dept: FAMILY MEDICINE CLINIC | Age: 35
End: 2025-06-27

## 2025-06-27 NOTE — TELEPHONE ENCOUNTER
----- Message from Valdemar CARLOS sent at 6/27/2025  9:05 AM EDT -----  Regarding: [facility] Appointment Request  [facility] Appointment Request    Patient needs appointment for [facility] Appointment Type: Existing Condition Follow Up.    Patient Requested Dates(s): As soon as possible  Patient Requested Time: Anytime  Provider Name: Senia Talavera MD    Reason for Appointment Request: Established Patient - Available appointments did not meet patient need.  --------------------------------------------------------------------------------------------------------------------------    Relationship to Patient: Self     Call Back Information: OK to leave message on voicemail  Preferred Call Back Number: Phone [phone]

## 2025-08-06 DIAGNOSIS — F90.2 ATTENTION DEFICIT HYPERACTIVITY DISORDER (ADHD), COMBINED TYPE: ICD-10-CM

## 2025-08-06 RX ORDER — DEXMETHYLPHENIDATE HYDROCHLORIDE 20 MG/1
20 CAPSULE, EXTENDED RELEASE ORAL DAILY
Qty: 30 CAPSULE | Refills: 0 | Status: SHIPPED | OUTPATIENT
Start: 2025-08-06 | End: 2026-08-07

## 2025-09-02 DIAGNOSIS — F90.2 ATTENTION DEFICIT HYPERACTIVITY DISORDER (ADHD), COMBINED TYPE: ICD-10-CM

## 2025-09-04 RX ORDER — DEXMETHYLPHENIDATE HYDROCHLORIDE 20 MG/1
20 CAPSULE, EXTENDED RELEASE ORAL DAILY
Qty: 30 CAPSULE | Refills: 0 | Status: SHIPPED | OUTPATIENT
Start: 2025-09-06 | End: 2026-09-07

## (undated) DEVICE — GAUZE,SPONGE,3"X3",4PLY,NS,LF: Brand: MEDLINE

## (undated) DEVICE — FORCEPS BX L240CM JAW DIA2.4MM ORNG L CAP W/ NDL DISP RAD

## (undated) DEVICE — CONNECTOR TBNG AUX H2O JET DISP FOR OLY 160/180 SER

## (undated) DEVICE — FORCEPS BX L240CM JAW DIA2.8MM L CAP W/ NDL MIC MESH TOOTH

## (undated) DEVICE — BASIN EMSIS 700ML GRAPHITE PLAS KID SHP GRAD

## (undated) DEVICE — POLYP TRAP: Brand: TRAPEASE®

## (undated) DEVICE — JELLY,LUBE,STERILE,FLIP TOP,TUBE,2-OZ: Brand: MEDLINE

## (undated) DEVICE — ADAPTER,CATHETER/SYRINGE/LUER,STERILE: Brand: MEDLINE

## (undated) DEVICE — SYRINGE MED 50ML LUERLOCK TIP

## (undated) DEVICE — Device: Brand: DEFENDO VALVE AND CONNECTOR KIT

## (undated) DEVICE — BITEBLOCK 54FR W/ DENT RIM BLOX

## (undated) DEVICE — CANNULA IV 18GA L15IN BLNT FILL LUERLOCK HUB MJCT

## (undated) DEVICE — TUBING, SUCTION, 3/16" X 10', STRAIGHT: Brand: MEDLINE

## (undated) DEVICE — 4-PORT MANIFOLD: Brand: NEPTUNE 2